# Patient Record
Sex: FEMALE | Race: WHITE | NOT HISPANIC OR LATINO | URBAN - METROPOLITAN AREA
[De-identification: names, ages, dates, MRNs, and addresses within clinical notes are randomized per-mention and may not be internally consistent; named-entity substitution may affect disease eponyms.]

---

## 2018-11-11 ENCOUNTER — HOSPITAL ENCOUNTER (EMERGENCY)
Facility: OTHER | Age: 43
Discharge: HOME | End: 2018-11-11
Attending: EMERGENCY MEDICINE
Payer: COMMERCIAL

## 2018-11-11 VITALS
SYSTOLIC BLOOD PRESSURE: 105 MMHG | HEART RATE: 90 BPM | OXYGEN SATURATION: 98 % | TEMPERATURE: 98 F | RESPIRATION RATE: 18 BRPM | WEIGHT: 140 LBS | HEIGHT: 63 IN | BODY MASS INDEX: 24.8 KG/M2 | DIASTOLIC BLOOD PRESSURE: 63 MMHG

## 2018-11-11 DIAGNOSIS — O41.8X10 SUBCHORIONIC HEMORRHAGE OF PLACENTA IN FIRST TRIMESTER, SINGLE OR UNSPECIFIED FETUS: ICD-10-CM

## 2018-11-11 DIAGNOSIS — O46.8X1 SUBCHORIONIC HEMORRHAGE OF PLACENTA IN FIRST TRIMESTER, SINGLE OR UNSPECIFIED FETUS: ICD-10-CM

## 2018-11-11 DIAGNOSIS — O20.0 THREATENED MISCARRIAGE IN EARLY PREGNANCY: Primary | ICD-10-CM

## 2018-11-11 LAB
ABO + RH BLD: NORMAL
ALBUMIN SERPL BCP-MCNC: 4.4 G/DL
ALP SERPL-CCNC: 61 U/L
ALT SERPL W/O P-5'-P-CCNC: 20 U/L
ANION GAP SERPL CALC-SCNC: 10 MMOL/L
AST SERPL-CCNC: 20 U/L
B-HCG UR QL: POSITIVE
BASOPHILS # BLD AUTO: 0.01 K/UL
BASOPHILS NFR BLD: 0.1 %
BILIRUB SERPL-MCNC: 1.2 MG/DL
BILIRUB UR QL STRIP: NEGATIVE
BLD GP AB SCN CELLS X3 SERPL QL: NORMAL
BUN SERPL-MCNC: 18 MG/DL
CALCIUM SERPL-MCNC: 9.6 MG/DL
CHLORIDE SERPL-SCNC: 106 MMOL/L
CLARITY UR: CLEAR
CO2 SERPL-SCNC: 23 MMOL/L
COLOR UR: YELLOW
CREAT SERPL-MCNC: 0.8 MG/DL
CTP QC/QA: YES
DIFFERENTIAL METHOD: NORMAL
EOSINOPHIL # BLD AUTO: 0.2 K/UL
EOSINOPHIL NFR BLD: 2.7 %
ERYTHROCYTE [DISTWIDTH] IN BLOOD BY AUTOMATED COUNT: 12.1 %
EST. GFR  (AFRICAN AMERICAN): >60 ML/MIN/1.73 M^2
EST. GFR  (NON AFRICAN AMERICAN): >60 ML/MIN/1.73 M^2
GLUCOSE SERPL-MCNC: 85 MG/DL
GLUCOSE UR QL STRIP: NEGATIVE
HCG INTACT+B SERPL-ACNC: NORMAL MIU/ML
HCT VFR BLD AUTO: 42.5 %
HGB BLD-MCNC: 14.2 G/DL
HGB UR QL STRIP: NEGATIVE
KETONES UR QL STRIP: NEGATIVE
LEUKOCYTE ESTERASE UR QL STRIP: NEGATIVE
LYMPHOCYTES # BLD AUTO: 1.5 K/UL
LYMPHOCYTES NFR BLD: 22.3 %
MCH RBC QN AUTO: 28.6 PG
MCHC RBC AUTO-ENTMCNC: 33.4 G/DL
MCV RBC AUTO: 86 FL
MONOCYTES # BLD AUTO: 0.4 K/UL
MONOCYTES NFR BLD: 6.3 %
NEUTROPHILS # BLD AUTO: 4.5 K/UL
NEUTROPHILS NFR BLD: 68.2 %
NITRITE UR QL STRIP: NEGATIVE
PH UR STRIP: 6 [PH] (ref 5–8)
PLATELET # BLD AUTO: 234 K/UL
PMV BLD AUTO: 9.4 FL
POTASSIUM SERPL-SCNC: 3.7 MMOL/L
PROT SERPL-MCNC: 7.5 G/DL
PROT UR QL STRIP: NEGATIVE
RBC # BLD AUTO: 4.97 M/UL
SODIUM SERPL-SCNC: 139 MMOL/L
SP GR UR STRIP: 1.02 (ref 1–1.03)
URN SPEC COLLECT METH UR: NORMAL
UROBILINOGEN UR STRIP-ACNC: NEGATIVE EU/DL
WBC # BLD AUTO: 6.67 K/UL

## 2018-11-11 PROCEDURE — 84702 CHORIONIC GONADOTROPIN TEST: CPT

## 2018-11-11 PROCEDURE — 81025 URINE PREGNANCY TEST: CPT | Performed by: EMERGENCY MEDICINE

## 2018-11-11 PROCEDURE — 25000003 PHARM REV CODE 250: Performed by: EMERGENCY MEDICINE

## 2018-11-11 PROCEDURE — 86850 RBC ANTIBODY SCREEN: CPT

## 2018-11-11 PROCEDURE — 81003 URINALYSIS AUTO W/O SCOPE: CPT

## 2018-11-11 PROCEDURE — 85025 COMPLETE CBC W/AUTO DIFF WBC: CPT

## 2018-11-11 PROCEDURE — 99284 EMERGENCY DEPT VISIT MOD MDM: CPT | Mod: 25

## 2018-11-11 PROCEDURE — 80053 COMPREHEN METABOLIC PANEL: CPT

## 2018-11-11 PROCEDURE — 96360 HYDRATION IV INFUSION INIT: CPT

## 2018-11-11 RX ADMIN — SODIUM CHLORIDE 500 ML: 0.9 INJECTION, SOLUTION INTRAVENOUS at 09:11

## 2018-11-11 NOTE — ED PROVIDER NOTES
Encounter Date: 2018    SCRIBE #1 NOTE: I, Jojo Jain, am scribing for, and in the presence of, Dr. Pelletier.       History     Chief Complaint   Patient presents with    Vaginal Bleeding     approx 6.5 wk pregnant, , reports bleeding but no clots, denies abd pain or cramping     Time seen by provider: 8:41 AM    This is a 43 y.o. Female, that is reportedly 7 weeks pregnant, who presents with complaint of vaginal bleeding that began last night. She reports bleeding continued until this morning, but she is not currently bleeding. The patient also reports breast tenderness, and nausea. She denies cough, cold, congestion, or rhinorrhea. The patient reports this is her fourth pregnancy, and her other three pregnancies were unsuccessful. The patient reports she has not experienced vaginal bleeding during her other pregnancies. The patient reports using IVF cycle for her current pregnancy. The patient denies having an OB in [place] currently. The patient reports she is currently taking Crinone and prenatal medications. She denies a history of major medical problems.      The history is provided by the patient.     Review of patient's allergies indicates:  No Known Allergies  History reviewed. No pertinent past medical history.  Past Surgical History:   Procedure Laterality Date    ANTERIOR CRUCIATE LIGAMENT REPAIR Right 2012    SPINAL FUSION  2013     History reviewed. No pertinent family history.  Social History     Tobacco Use    Smoking status: Never Smoker   Substance Use Topics    Alcohol use: Yes    Drug use: No     Review of Systems   Constitutional: Negative for chills, diaphoresis and fever.   HENT: Negative for congestion, rhinorrhea and sore throat.    Respiratory: Negative for shortness of breath.    Cardiovascular: Negative for chest pain.   Gastrointestinal: Positive for nausea. Negative for diarrhea and vomiting.   Genitourinary: Positive for vaginal bleeding. Negative for dysuria.    Musculoskeletal: Negative for back pain.   Skin: Negative for pallor, rash and wound.   Neurological: Negative for weakness.   Hematological: Does not bruise/bleed easily.       Physical Exam     Initial Vitals [11/11/18 0819]   BP Pulse Resp Temp SpO2   130/79 86 18 98.3 °F (36.8 °C) 100 %      MAP       --         Physical Exam    Nursing note and vitals reviewed.  Constitutional: She appears well-developed and well-nourished. She is not diaphoretic. No distress.   HENT:   Head: Normocephalic and atraumatic.   Eyes: EOM are normal.   Neck: Normal range of motion.   Cardiovascular: Normal rate, regular rhythm and normal heart sounds.   Pulmonary/Chest: Breath sounds normal. No respiratory distress. She has no wheezes. She has no rhonchi. She has no rales.   Abdominal: Soft. She exhibits no distension. There is no tenderness. There is no rebound and no guarding.   Musculoskeletal: Normal range of motion. She exhibits no edema or tenderness.   Neurological: She is alert and oriented to person, place, and time.   Skin: Skin is warm and dry. No rash noted. No erythema. No pallor.         ED Course   Procedures  Labs Reviewed   POCT URINE PREGNANCY - Abnormal; Notable for the following components:       Result Value    POC Preg Test, Ur Positive (*)     All other components within normal limits   URINALYSIS, REFLEX TO URINE CULTURE    Narrative:     Preferred Collection Type->Urine, Clean Catch   CBC W/ AUTO DIFFERENTIAL   COMPREHENSIVE METABOLIC PANEL   HCG, QUANTITATIVE, PREGNANCY   TYPE & SCREEN          Imaging Results          US OB Less Than 14 Wks with Transvag(xpd (Final result)  Result time 11/11/18 11:03:54    Final result by Rocky Lopes MD (11/11/18 11:03:54)                 Impression:      Single live intrauterine pregnancy with estimated gestational age 7 weeks 6 days. and an FUNMILAYO of 06/24/2019.    Small to moderate amount of subchorionic hemorrhage noted.      Electronically signed by: Rocky  MD Marianne  Date:    11/11/2018  Time:    11:03             Narrative:    EXAMINATION:  US OB LESS THAN 14 WKS WITH TRANSVAGINAL (XPD)    CLINICAL HISTORY:  Vag Bleeding;    TECHNIQUE:  Transabdominal sonography of the pelvis was performed, followed by transvaginal sonography to better evaluate the uterus and ovaries.    COMPARISON:  None.    FINDINGS:  Intrauterine gestation(s): Single    Mean gestational sac diameter: 2.7 cm    Yolk sac: Visualized    Crown-rump length (CRL): 0.62 cm    Cardiac activity: 122 bpm    Subchorionic hemorrhage: Small to moderate amount of implantational hemorrhage noted along the inferior and posterior margins of the gestational sac.  Cystic-appearing area noted along the left anterolateral margins of the gestational sac measuring up to 8 mm, also possibly representing a site of implantation hemorrhage.    Right ovary: Normal.    Left ovary: Normal.    Miscellaneous: No Free Fluid.  Fluid oval shaped fluid structure measuring up to 16 mm noted near the internal os of the cervix, presumably representing a nabothian cyst.                                 Medical Decision Making:   Clinical Tests:   Lab Tests: Ordered and Reviewed  Radiological Study: Ordered and Reviewed  ED Management:  11:34 AM - I spoke with OB about patient.    11:43 AM - OB will come see patient at bedside.            Scribe Attestation:   Scribe #1: I performed the above scribed service and the documentation accurately describes the services I performed. I attest to the accuracy of the note.    Attending Attestation:           Physician Attestation for Scribe:  Physician Attestation Statement for Scribe #1: I, Dr. Pelletier, reviewed documentation, as scribed by Jojo Jain in my presence, and it is both accurate and complete.         Attending ED Notes:   Emergent evaluation of a 43-year-old female G4, P0, A3 approximately 7 weeks gestation who presents to the emergency room with complaint of spotting blood  yesterday.  Patient denies any pelvic pain or discharge. Patient is afebrile, nontoxic appearing with stable vital signs. Patient remained hemodynamically stable while in the emergency room.  H&H is within normal limits. Patient is Rh positive. Ultrasound reveals intrauterine pregnancy with subchorionic hemorrhage. The patient is extensively counseled on her diagnosis and treatment including all diagnostic, laboratory and physical exam findings.  The patient discharged in good condition and directed to follow up with her OB in the next 24-48 hours.             Clinical Impression:     1. Threatened miscarriage in early pregnancy    2. Subchorionic hemorrhage of placenta in first trimester, single or unspecified fetus                                   Rocky Pelletier MD  11/11/18 2474

## 2018-11-11 NOTE — ED NOTES
Vaginal bleeding x2 days, reports no clots. Reports IVF. Recently moved to town has not established OB care here    LOC: The patient is awake, alert and aware of environment with an appropriate affect, the patient is oriented x 3 and speaking appropriately.  APPEARANCE: Patient resting comfortably and in no acute distress, patient is clean and well groomed, patient's clothing is properly fastened.  SKIN: The skin is warm and dry, patient has normal skin turgor and moist mucus membranes, skin intact, no breakdown or brusing noted.  MUSKULOSKELETAL: Patient moving all extremities well, no obvious swelling or deformities noted.  RESPIRATORY: Airway is open and patent, respirations are spontaneous, patient has a normal effort and rate. Breath sounds are clear and equal bilaterally.    ABDOMEN: Soft and non tender to palpation, no distention noted. Bowel sounds present.

## 2018-11-11 NOTE — ED NOTES
Rounding has been complete. Pt laying on stretcher with HOB elevated. NAD noted. Pt AAOx3. Safety/medical protocol initiated.Call bell within reach. Will continue to monitor.

## 2018-11-12 ENCOUNTER — PES CALL (OUTPATIENT)
Dept: ADMINISTRATIVE | Facility: CLINIC | Age: 43
End: 2018-11-12

## 2018-11-14 ENCOUNTER — INITIAL PRENATAL (OUTPATIENT)
Dept: OBSTETRICS AND GYNECOLOGY | Facility: CLINIC | Age: 43
End: 2018-11-14
Payer: COMMERCIAL

## 2018-11-14 ENCOUNTER — LAB VISIT (OUTPATIENT)
Dept: LAB | Facility: OTHER | Age: 43
End: 2018-11-14
Attending: OBSTETRICS & GYNECOLOGY
Payer: COMMERCIAL

## 2018-11-14 VITALS
DIASTOLIC BLOOD PRESSURE: 62 MMHG | BODY MASS INDEX: 26.24 KG/M2 | SYSTOLIC BLOOD PRESSURE: 114 MMHG | WEIGHT: 148.13 LBS

## 2018-11-14 DIAGNOSIS — O09.521 ELDERLY MULTIGRAVIDA IN FIRST TRIMESTER: ICD-10-CM

## 2018-11-14 DIAGNOSIS — Z32.01 POSITIVE PREGNANCY TEST: ICD-10-CM

## 2018-11-14 DIAGNOSIS — O09.819 HIGH RISK PREGNANCY DUE TO ASSISTED REPRODUCTIVE TECHNOLOGY: ICD-10-CM

## 2018-11-14 DIAGNOSIS — O09.299 TRISOMY 18 IN CHILD OF PRIOR PREGNANCY, CURRENTLY PREGNANT: ICD-10-CM

## 2018-11-14 DIAGNOSIS — Z32.01 POSITIVE PREGNANCY TEST: Primary | ICD-10-CM

## 2018-11-14 LAB
ABO + RH BLD: NORMAL
BASOPHILS # BLD AUTO: 0.01 K/UL
BASOPHILS NFR BLD: 0.1 %
BLD GP AB SCN CELLS X3 SERPL QL: NORMAL
DIFFERENTIAL METHOD: NORMAL
EOSINOPHIL # BLD AUTO: 0.3 K/UL
EOSINOPHIL NFR BLD: 3.1 %
ERYTHROCYTE [DISTWIDTH] IN BLOOD BY AUTOMATED COUNT: 12.1 %
HCT VFR BLD AUTO: 41.1 %
HGB BLD-MCNC: 13.6 G/DL
LYMPHOCYTES # BLD AUTO: 2.3 K/UL
LYMPHOCYTES NFR BLD: 24.5 %
MCH RBC QN AUTO: 28.5 PG
MCHC RBC AUTO-ENTMCNC: 33.1 G/DL
MCV RBC AUTO: 86 FL
MONOCYTES # BLD AUTO: 0.7 K/UL
MONOCYTES NFR BLD: 7.4 %
NEUTROPHILS # BLD AUTO: 6 K/UL
NEUTROPHILS NFR BLD: 64.6 %
PLATELET # BLD AUTO: 268 K/UL
PMV BLD AUTO: 9.3 FL
RBC # BLD AUTO: 4.78 M/UL
WBC # BLD AUTO: 9.25 K/UL

## 2018-11-14 PROCEDURE — 88175 CYTOPATH C/V AUTO FLUID REDO: CPT

## 2018-11-14 PROCEDURE — 87086 URINE CULTURE/COLONY COUNT: CPT

## 2018-11-14 PROCEDURE — 0500F INITIAL PRENATAL CARE VISIT: CPT | Mod: S$GLB,,, | Performed by: OBSTETRICS & GYNECOLOGY

## 2018-11-14 PROCEDURE — 36415 COLL VENOUS BLD VENIPUNCTURE: CPT

## 2018-11-14 PROCEDURE — 99999 PR PBB SHADOW E&M-EST. PATIENT-LVL III: CPT | Mod: PBBFAC,,, | Performed by: OBSTETRICS & GYNECOLOGY

## 2018-11-14 PROCEDURE — 86762 RUBELLA ANTIBODY: CPT

## 2018-11-14 PROCEDURE — 86703 HIV-1/HIV-2 1 RESULT ANTBDY: CPT

## 2018-11-14 PROCEDURE — 87491 CHLMYD TRACH DNA AMP PROBE: CPT

## 2018-11-14 PROCEDURE — 86901 BLOOD TYPING SEROLOGIC RH(D): CPT

## 2018-11-14 PROCEDURE — 86592 SYPHILIS TEST NON-TREP QUAL: CPT

## 2018-11-14 PROCEDURE — 85025 COMPLETE CBC W/AUTO DIFF WBC: CPT

## 2018-11-14 PROCEDURE — 87340 HEPATITIS B SURFACE AG IA: CPT

## 2018-11-14 RX ORDER — TEMAZEPAM 15 MG/1
CAPSULE ORAL
Refills: 0 | COMMUNITY
Start: 2018-11-01 | End: 2018-11-14

## 2018-11-14 RX ORDER — CLOBETASOL PROPIONATE 0.46 MG/ML
SOLUTION TOPICAL
Refills: 0 | COMMUNITY
Start: 2018-08-13 | End: 2018-11-14

## 2018-11-14 RX ORDER — BUPROPION HYDROCHLORIDE 100 MG/1
TABLET ORAL
Refills: 0 | COMMUNITY
Start: 2018-11-01 | End: 2018-11-14

## 2018-11-14 RX ORDER — ESTRADIOL 0.1 MG/D
PATCH TRANSDERMAL
Refills: 0 | COMMUNITY
Start: 2018-09-21 | End: 2018-11-14

## 2018-11-14 RX ORDER — OXYCODONE HYDROCHLORIDE 5 MG/1
TABLET ORAL
Refills: 0 | COMMUNITY
Start: 2018-10-09 | End: 2018-11-14

## 2018-11-14 RX ORDER — DOXYLAMINE SUCCINATE AND PYRIDOXINE HYDROCHLORIDE, DELAYED RELEASE TABLETS 10 MG/10 MG 10; 10 MG/1; MG/1
2 TABLET, DELAYED RELEASE ORAL NIGHTLY
Qty: 60 TABLET | Refills: 3 | Status: SHIPPED | OUTPATIENT
Start: 2018-11-14 | End: 2019-07-30 | Stop reason: ALTCHOICE

## 2018-11-14 RX ORDER — DEXTROAMPHETAMINE SACCHARATE, AMPHETAMINE ASPARTATE, DEXTROAMPHETAMINE SULFATE AND AMPHETAMINE SULFATE 2.5; 2.5; 2.5; 2.5 MG/1; MG/1; MG/1; MG/1
TABLET ORAL
Refills: 0 | COMMUNITY
Start: 2018-11-01 | End: 2018-11-14

## 2018-11-14 RX ORDER — CICLOPIROX 1 G/100ML
SHAMPOO TOPICAL
Refills: 0 | COMMUNITY
Start: 2018-10-08 | End: 2018-11-14

## 2018-11-15 PROBLEM — O09.299 TRISOMY 18 IN CHILD OF PRIOR PREGNANCY, CURRENTLY PREGNANT: Status: ACTIVE | Noted: 2018-11-15

## 2018-11-15 PROBLEM — O09.521 ELDERLY MULTIGRAVIDA IN FIRST TRIMESTER: Status: ACTIVE | Noted: 2018-11-15

## 2018-11-15 PROBLEM — O09.819 HIGH RISK PREGNANCY DUE TO ASSISTED REPRODUCTIVE TECHNOLOGY: Status: ACTIVE | Noted: 2018-11-15

## 2018-11-15 LAB
C TRACH DNA SPEC QL NAA+PROBE: NOT DETECTED
HBV SURFACE AG SERPL QL IA: NEGATIVE
HIV 1+2 AB+HIV1 P24 AG SERPL QL IA: NEGATIVE
N GONORRHOEA DNA SPEC QL NAA+PROBE: NOT DETECTED
RPR SER QL: NORMAL
RUBV IGG SER-ACNC: 36.5 IU/ML
RUBV IGG SER-IMP: REACTIVE

## 2018-11-16 LAB — BACTERIA UR CULT: NO GROWTH

## 2018-11-19 ENCOUNTER — PATIENT MESSAGE (OUTPATIENT)
Dept: OBSTETRICS AND GYNECOLOGY | Facility: CLINIC | Age: 43
End: 2018-11-19

## 2018-11-20 ENCOUNTER — PATIENT MESSAGE (OUTPATIENT)
Dept: OBSTETRICS AND GYNECOLOGY | Facility: CLINIC | Age: 43
End: 2018-11-20

## 2018-11-21 RX ORDER — ONDANSETRON 4 MG/1
4 TABLET, FILM COATED ORAL DAILY PRN
Qty: 30 TABLET | Refills: 1 | Status: SHIPPED | OUTPATIENT
Start: 2018-11-21 | End: 2019-04-22

## 2018-11-27 ENCOUNTER — PROCEDURE VISIT (OUTPATIENT)
Dept: OBSTETRICS AND GYNECOLOGY | Facility: CLINIC | Age: 43
End: 2018-11-27
Payer: COMMERCIAL

## 2018-11-27 DIAGNOSIS — Z32.01 POSITIVE PREGNANCY TEST: ICD-10-CM

## 2018-11-27 DIAGNOSIS — O20.9 VAGINAL BLEEDING IN PREGNANCY, FIRST TRIMESTER: ICD-10-CM

## 2018-11-27 DIAGNOSIS — O09.819 HIGH RISK PREGNANCY DUE TO ASSISTED REPRODUCTIVE TECHNOLOGY: ICD-10-CM

## 2018-11-27 DIAGNOSIS — O09.521 ELDERLY MULTIGRAVIDA IN FIRST TRIMESTER: ICD-10-CM

## 2018-11-27 PROCEDURE — 76801 OB US < 14 WKS SINGLE FETUS: CPT | Mod: S$GLB,,, | Performed by: OBSTETRICS & GYNECOLOGY

## 2018-11-27 NOTE — PROCEDURES
Obstetrical ultrasound completed today.  See report in imaging section of [facility].

## 2018-11-28 ENCOUNTER — PATIENT MESSAGE (OUTPATIENT)
Dept: OBSTETRICS AND GYNECOLOGY | Facility: CLINIC | Age: 43
End: 2018-11-28

## 2018-12-11 ENCOUNTER — INITIAL CONSULT (OUTPATIENT)
Dept: MATERNAL FETAL MEDICINE | Facility: CLINIC | Age: 43
End: 2018-12-11
Payer: COMMERCIAL

## 2018-12-11 ENCOUNTER — PROCEDURE VISIT (OUTPATIENT)
Dept: MATERNAL FETAL MEDICINE | Facility: CLINIC | Age: 43
End: 2018-12-11
Payer: COMMERCIAL

## 2018-12-11 VITALS
BODY MASS INDEX: 26.87 KG/M2 | DIASTOLIC BLOOD PRESSURE: 90 MMHG | SYSTOLIC BLOOD PRESSURE: 138 MMHG | WEIGHT: 151.69 LBS

## 2018-12-11 DIAGNOSIS — O36.80X0 ENCOUNTER TO DETERMINE FETAL VIABILITY OF PREGNANCY, SINGLE OR UNSPECIFIED FETUS: ICD-10-CM

## 2018-12-11 DIAGNOSIS — O28.3 INCREASED NUCHAL TRANSLUCENCY SPACE ON FETAL ULTRASOUND: ICD-10-CM

## 2018-12-11 DIAGNOSIS — O09.521 ELDERLY MULTIGRAVIDA IN FIRST TRIMESTER: Primary | ICD-10-CM

## 2018-12-11 DIAGNOSIS — O36.80X0 ENCOUNTER TO DETERMINE FETAL VIABILITY OF PREGNANCY, SINGLE OR UNSPECIFIED FETUS: Primary | ICD-10-CM

## 2018-12-11 DIAGNOSIS — O09.819 HIGH RISK PREGNANCY DUE TO ASSISTED REPRODUCTIVE TECHNOLOGY: ICD-10-CM

## 2018-12-11 DIAGNOSIS — O28.3 INCREASED NUCHAL TRANSLUCENCY SPACE ON FETAL ULTRASOUND: Primary | ICD-10-CM

## 2018-12-11 DIAGNOSIS — O35.8XX0 CYSTIC HYGROMA OF FETUS IN SINGLETON PREGNANCY: ICD-10-CM

## 2018-12-11 DIAGNOSIS — O09.521 ELDERLY MULTIGRAVIDA IN FIRST TRIMESTER: ICD-10-CM

## 2018-12-11 DIAGNOSIS — N96 RECURRENT PREGNANCY LOSS: ICD-10-CM

## 2018-12-11 DIAGNOSIS — O09.299 TRISOMY 18 IN CHILD OF PRIOR PREGNANCY, CURRENTLY PREGNANT: ICD-10-CM

## 2018-12-11 PROCEDURE — 76945 ECHO GUIDE VILLUS SAMPLING: CPT | Mod: S$GLB,,, | Performed by: OBSTETRICS & GYNECOLOGY

## 2018-12-11 PROCEDURE — 59015 CHORION BIOPSY: CPT | Mod: S$GLB,,, | Performed by: OBSTETRICS & GYNECOLOGY

## 2018-12-11 PROCEDURE — 76813 OB US NUCHAL MEAS 1 GEST: CPT | Mod: S$GLB,,, | Performed by: OBSTETRICS & GYNECOLOGY

## 2018-12-11 PROCEDURE — 99499 UNLISTED E&M SERVICE: CPT | Mod: S$GLB,,, | Performed by: OBSTETRICS & GYNECOLOGY

## 2018-12-11 PROCEDURE — 99243 OFF/OP CNSLTJ NEW/EST LOW 30: CPT | Mod: 25,S$GLB,, | Performed by: OBSTETRICS & GYNECOLOGY

## 2018-12-11 PROCEDURE — 99999 PR PBB SHADOW E&M-EST. PATIENT-LVL III: CPT | Mod: PBBFAC,,, | Performed by: OBSTETRICS & GYNECOLOGY

## 2018-12-11 NOTE — LETTER
December 11, 2018      Siobhan Burch MD  [address]  [address]  [facility] [address]           [facility]  [address]  [facility] [address]  Phone: [phone]          Patient: Marisela Siddiqui   MR Number: [id number]   YOB: 1975   Date of Visit: 12/11/2018       Dear Dr. Siobhan Burch:    Thank you for referring Marisela Siddiqui to me for evaluation. Attached you will find relevant portions of my assessment and plan of care.    If you have questions, please do not hesitate to call me. I look forward to following Marisela Siddiqui along with you.    Sincerely,    Russ Lazaro RN    Enclosure  CC:  No Recipients    If you would like to receive this communication electronically, please contact [email or web address] or [phone] to request more information on [employer] access.    For providers and/or their staff who would like to refer a patient to [facility], please contact us through our one-stop-shop provider referral line, [facility], at [phone].    If you feel you have received this communication in error or would no longer like to receive these types of communications, please e-mail [email or web address]

## 2018-12-11 NOTE — PROGRESS NOTES
CVS Procedure Note    Indication  ========    Karyotyping. Increased fetal nuchal translucency.    History  ======    General History  Blood group: 0  Rhesus: Rh positive  Previous Outcomes  Preg. no. 1  Outcome: Spontaneous miscarriage  Preg. no. 2  Outcome: Spontaneous miscarriage  Preg. no. 3  Outcome: Spontaneous miscarriage   4  Para 0  Risk Factors  Details: IVF  History risk factors: AMA    Method  ======    Transabdominal ultrasound examination, 2D Color Doppler, Voluson E10. View: Good view.    Pregnancy  =========    Cruz pregnancy. Number of fetuses: 1.    Dating  ======    Conception: IVF  Embryo transfer on: 10/12/2018  IVF / ET 3 d  GA by IVF / ET 11 w + 0 d  FUNMILAYO by IVF / ET: 2019  Assigned: Dating performed on 2018, based on the IVF / ET date  Assigned GA 11 w + 0 d  Assigned FUNMILAYO: 2019    General Evaluation  ==============    Cardiac activity: present.  Placenta: anterior.    Fetal Biometry  ============    Fetal Biometry  Calculated by: Hadlock (BPD-HC-AC-FL)   bpm    Counseling  =========    CVS was accepted by the patient on 2018. The risk of the CVS was discussed with the patient. Consent form was obtained. Course of  the CVS procedure, risk, possible test results and follow-up instructions were discussed.    Premedication / Anesthesia  =====================    Premedication / Anesthesia not given.    CVS  ====    Start 2:50 PM. Duration 10 min.  Instrument: Cook catheter. Entries uterus: 2. Entries target: 1.  Sample: obtained. Sample amount 15 mg.  Sample preparation: direct preparation. Sample identification confirmed.    Evaluation  ========    Post cardiac activity: present, normal. FHR post 158 bpm.  Post NST not performed.  Post AF assessment not performed.    Immediate Treatment  ================    No treatment.    Rhesus Prophylaxis  ===============    Rh positive.    Consultation  ==========    Risks, benefits and alternatives discussed with  patient.  She desired to proceed.  Post-procedure precautions reviewed with patient.  See Dr. Veras's note on findings and counseling on US findings.    Impression  =========    Successful CVS for increased NT  Tissue sent for FISH, karyotype and hold for microarray.    Recommendation  ==============    Follow-up results of CVS.

## 2018-12-11 NOTE — PROGRESS NOTES
Indication  ========    First trimester screening.    History  ======    Previous Outcomes  Preg. no. 1  Outcome: Spontaneous miscarriage  Preg. no. 2  Outcome: Spontaneous miscarriage  Preg. no. 3  Outcome: Spontaneous miscarriage   4  Para 0  Risk Factors  Details: IVF  History risk factors: AMA    Maternal Assessment  =================    BP syst 138 mmHg  BP diast 90 mmHg    Method  ======    Transabdominal ultrasound examination, 2D Color Doppler, Voluson E10. View: Good view.    Pregnancy  =========    Cruz pregnancy. Number of fetuses: 1.    Dating  ======    Conception: IVF  Embryo transfer on: 10/12/2018  IVF / ET 3 d  GA by IVF / ET 11 w + 0 d  FUNMILAYO by IVF / ET: 2019  Ultrasound examination on: 2018  GA by U/S based upon: CRL  GA by U/S 10 w + 4 d  FUNMILAYO by U/S: 2019  Assigned: Dating performed on 2018, based on the IVF / ET date  Assigned GA 11 w + 0 d  Assigned FUNMILAYO: 2019    General Evaluation  ==============    Cardiac activity: present.    Fetal Biometry  ============    CRL 36.5 mm 10w 4d Hadlock  NT 4.3 mm   bpm    Fetal Anatomy  ============    The following structures appear abnormal:  Neck: cystic hygroma.    The following structures were visualized:  Cranium. Arms. Legs.    Consultation  ==========    Type: IVF pregnancy and history of T18 fetus and recurrent loss.  Ms. Siddiqui is a 44y/o  at 11 weeks based on 3 day embryo transfer who presents for consultation due to IVF pregnancy and  history of T18 SAB at 12 weeks and recurrent pregnancy loss.    PMHx: AMA  IVF embryo transfer-own eggs at age 43 (no PGT-A done)  PSHx: D and C (multiple)  Meds: Diclegis, PNV, vaginal progesterone  Surgeries: -spinal fusion  -right anterior cruciate ligament repair  Allergy: sulfa  Social: Denies smoking, alcohol, illicit drug use  Family history birth defects: Patient had Trisomy 18 fetus (CVS done prior to demise) with D and C x2-12 weeks  Patient also  reports a 6 week loss with D and Cx2-reports was Trisomy 22  Patient reports 5 week loss with D and C-was Trisomy 22  Has not had NIPT drawn yet    /90  Beta 2 glycoprotein antibodies negative, LAC negative, Anticardiolipin antibodies negative  Normal maternal karyotype, patient reports FOB had normal chromosomes.  Limited records available    A/P:1. IUP at 11 weeks    2. Cystic hygroma: On ultrasound today, a cystic hygroma was seen with mild body wall edema. Consultation was limited today given  ultrasound findings of a cystic hygroma and focused primarily on this finding. We discussed that a cystic hygroma can be associated with  chromosomal abnormalities, genetic conditions, structural malformations and increased risk of adverse outcome and fetal/ demise.  Approximately 50% of fetuses with a cystic hygroma have a chromosomal abnormality. We reviewed the common types of chromosomal  abnormalities seen with cystic hygroma. We discussed that if the fetus is euploid, about 1/3 of those fetuses will have a major structural  anomaly with common abnormalities including cardiac and skeletal anomalies. For euploid fetuses without structural abnormalities, there is  still a 25% risk of demise. Approximately 15-20% of fetuses with first trimester cystic hygroma end in normal liveborn term . The  patient was shown this information in Fetology. Approximately, 80% resolve. We discussed the body wall edema does increase the risk for  demise. We also discussed the potential association of cystic hygroma with genetic syndromes, including but not limited to [place] syndrome  and disorders of copy number variation. The patient is considering termination. We reviewed the limitations of NIPT and that NIPT is not a  diagnostic test. The patient desires a CVS. She is Rh positive. We discussed the risks, benefits, and alternatives and limitations of CVS and  the 1/500 risk of pregnancy related complications. We also  discussed confined placental mosaicism. We discussed FISH aneuscreen,  karyotype and option of microarray. If patient continues the pregnancy, recommend primary ob offer MSAFP only in the second trimester. A 16  week ultrasound for limited anatomy, ultrasound for detailed anatomy at 19 to 20 weeks and a fetal echo would recommended, with follow up  growth as pregnancy progresses. Patient will consider PGT-A in the future -she had not done because she was concerned about potential  effect on the embryo. Patient is aware that even with a negative  workup, there is still about a 5% residual risk of an abnormal  pediatric outcome per Fetology. Risk of progression to hydrops during pregnancy was reviewed.Dr. Escobar will perform CVS this pm. Dr. Burch  aware.    3. AMA:AMA is associated with an increased risk of adverse pregnancy outcomes such as miscarriage, ectopic pregnancy, diabetes,  hypertension, and other adverse outcomes. There is also an increased risk of stillbirth, particularly in women age 40 and above.    -[facility] recommends the following for women 35 and older at their FUNMILAYO:  -Detailed fetal anatomic survey  --In women 40 years and older at FUNMILAYO, recommend weekly MVP/NST or weekly BPP at 32 weeks gestation. (this may be altered  depending  on if there is a chromosomal abnormality or structural malformation)  -In women 40 years and older at FUNMILAYO, recommend delivery between 39 and 40 weeks gestation.  -Recommend offering baby aspirin 81mg PO daily at 12-14 weeks gestation  This was not extensively discussed with the patient today given the above findings.    4. With IVF pregnancies, fetal echo and  fetal surveillance are recommended.    5. Primary ob provider should advise the patient of the risks of multiple D and Cs.    6. BP was elevated today-patient reports this is due to anxiety and not a chronic condition.      45 minutes was spent in face to face time with greater than half of  that time spent in counseling and coordination of care.    Impression  =========    Cruz live intrauterine pregnancy.  Biometry agrees with the clinic dating.  Cystic hygroma is noted with body wall edema thorax and abdomen.    Recommendation  ==============    CVS this afternoon with Dr. Escobar.  Further follow up to be planned pending CVS results.

## 2018-12-12 ENCOUNTER — DOCUMENTATION ONLY (OUTPATIENT)
Dept: MATERNAL FETAL MEDICINE | Facility: CLINIC | Age: 43
End: 2018-12-12

## 2018-12-12 NOTE — PROGRESS NOTES
Spoke to patient via phone.  She is doing well.   Started back on Wellbutrin.  Questions about who would call with results-discussed often nurses would call if normal but we would be happy to have physician to call her with results regardless.  Staff and Dr. Escobar messaged.

## 2018-12-14 ENCOUNTER — TELEPHONE (OUTPATIENT)
Dept: MATERNAL FETAL MEDICINE | Facility: CLINIC | Age: 43
End: 2018-12-14

## 2018-12-14 ENCOUNTER — PATIENT MESSAGE (OUTPATIENT)
Dept: OBSTETRICS AND GYNECOLOGY | Facility: CLINIC | Age: 43
End: 2018-12-14

## 2018-12-14 ENCOUNTER — PATIENT MESSAGE (OUTPATIENT)
Dept: MATERNAL FETAL MEDICINE | Facility: CLINIC | Age: 43
End: 2018-12-14

## 2018-12-14 NOTE — TELEPHONE ENCOUNTER
Result from the chorionic villus sampling were received this morning from [place].  I called the patient and discussed them with her.  Findings were consistent with trisomy 18.  I explained that given the ultrasound findings plus the fish, while the karyotype will take 5 to 7 more days, I would anticipate it confirming these findings. She requested a copy of the results and she is going to come and pick that up.  I explained to her that [facility] does not perform pregnancy interruption for fetal conditions.  She was provided with information on some clinics in the area by Dr. Veras.  I explained that I would be happy to discuss with any providers or provide any assistance I could to her.

## 2018-12-16 ENCOUNTER — PATIENT MESSAGE (OUTPATIENT)
Dept: OBSTETRICS AND GYNECOLOGY | Facility: CLINIC | Age: 43
End: 2018-12-16

## 2018-12-17 LAB
MISCELLANEOUS TEST NAME: NORMAL
REFERENCE LAB: NORMAL
SPECIMEN TYPE: NORMAL
TEST RESULT: NORMAL

## 2018-12-18 ENCOUNTER — TELEPHONE (OUTPATIENT)
Dept: MATERNAL FETAL MEDICINE | Facility: CLINIC | Age: 43
End: 2018-12-18

## 2018-12-18 NOTE — TELEPHONE ENCOUNTER
Patient was notified of CVS chromosome analysis result:  47, XX, +18    Pt verbalized understanding of information.

## 2018-12-20 ENCOUNTER — PATIENT MESSAGE (OUTPATIENT)
Dept: MATERNAL FETAL MEDICINE | Facility: CLINIC | Age: 43
End: 2018-12-20

## 2018-12-20 LAB
MISCELLANEOUS TEST NAME: NORMAL
REFERENCE LAB: NORMAL
SPECIMEN TYPE: NORMAL
TEST RESULT: NORMAL

## 2019-01-11 NOTE — PROGRESS NOTES
Subjective:       Patient ID: Marisela Siddiqui is a 43 y.o. female.    Chief Complaint: Establish care, annual     HPI  This patient is new to me.   Marisela Siddiqui is a 43 y.o. year old female with seborrheic dermatitis, post-partum depression, ADHD, recurrent oral herpes who presents today to establish care.     Patient recently moved here with  from [facility].     Patient has gone through multiple IVF cycles and subsequent miscarriages. Most recently she traveled out of [facility] for a D&E with a trisomy 18 fetus. Patient has had bouts of post-partum depression. Currently she takes bupropion 200mg daily, which is helping. Says she is feeling better, although not back to normal. Still has crying spells. Trying to be more social again. She is functional at work. Denies SI.     Seborrheic dermatitis - uses Ciclopirox shampoo - still has symptoms, however.     Recurrent oral herpes - uses valacyclovir PRN. Flares more when she is stressed.     ADHD - Used to take Adderall 10mg once a day. Hasn't taken in 6 months due to pregnancies.     Exercise - has been very active in the past, getting back into running again now.     Per patient, tetanus vaccine was in 2015.     OB/GYN History     LMP: mid-2018  Sexually active: yes  Last Pap smear: 18 - normal   Mammogram: 2015 - normal     I personally reviewed Past Medical History, Past Surgical History, Social History, and Family History    Review of Systems   Constitutional: Negative for chills, fatigue, fever and unexpected weight change.   HENT: Negative for congestion, hearing loss, rhinorrhea and sore throat.    Eyes: Negative for visual disturbance.   Respiratory: Negative for cough, shortness of breath and wheezing.    Cardiovascular: Negative for chest pain, palpitations and leg swelling.   Gastrointestinal: Negative for abdominal pain, constipation, diarrhea, nausea and vomiting.   Genitourinary: Negative for dysuria, frequency, menstrual problem and  "urgency.   Musculoskeletal: Negative for arthralgias and myalgias.   Skin: Negative for rash.   Neurological: Negative for dizziness, syncope and headaches.   Psychiatric/Behavioral: Positive for dysphoric mood. Negative for self-injury and sleep disturbance. The patient is not nervous/anxious.        Objective:      Vitals:    01/14/19 1127   BP: (!) 128/90   Pulse: 87   Resp: 12   SpO2: 100%   Weight: 70.5 kg (155 lb 6.8 oz)   Height: 5' 3" (1.6 m)     Physical Exam   Constitutional: She is oriented to person, place, and time. She appears well-developed and well-nourished. No distress.   HENT:   Head: Normocephalic and atraumatic.   Right Ear: Hearing, tympanic membrane, external ear and ear canal normal.   Left Ear: Hearing, tympanic membrane, external ear and ear canal normal.   Nose: Nose normal.   Mouth/Throat: Oropharynx is clear and moist and mucous membranes are normal. No oropharyngeal exudate.   Eyes: Conjunctivae and lids are normal. Pupils are equal, round, and reactive to light.   Neck: Normal range of motion. No thyroid mass and no thyromegaly present.   Cardiovascular: Normal rate, regular rhythm, S1 normal, S2 normal and intact distal pulses.   No murmur heard.  No lower extremity edema.    Pulmonary/Chest: Effort normal and breath sounds normal. No respiratory distress.   Abdominal: Soft. Normal appearance and bowel sounds are normal. There is no tenderness.   Lymphadenopathy:     She has no cervical adenopathy.        Right: No supraclavicular adenopathy present.        Left: No supraclavicular adenopathy present.   Neurological: She is alert and oriented to person, place, and time.   Skin: Skin is warm and dry. No rash noted.   Psychiatric: She has a normal mood and affect. Her behavior is normal. Thought content normal.   Nursing note and vitals reviewed.      Assessment:       1. Annual physical exam    2. Post partum depression    3. Seborrheic dermatitis of scalp    4. Recurrent oral herpes " simplex    5. Attention deficit hyperactivity disorder (ADHD), unspecified ADHD type    6. Screening for lipid disorders    7. Screening for skin cancer    8. Encounter for screening mammogram for breast cancer        Plan:      Diagnoses and all orders for this visit:    Annual physical exam        - Screening labs ordered. Discussed healthy diet and exercise. Patient had normal fasting glucose in Nov 2018. Up to date with Pap smear. Mammogram ordered.   -     TSH; Future  -     Lipid panel; Future    Post partum depression         - Continue bupropion 200mg daily. Symptoms improved and stable. Patient will decide if she would like to see psychiatry in the future if symptoms worsen or are difficult to control.     Seborrheic dermatitis of scalp  -     ciclopirox 1 % shampoo; Apply ~5-10 mL to wet hair; lather, and leave on hair and scalp for ~3 minutes; rinse.  -     Ambulatory Referral to Dermatology    Recurrent oral herpes simplex        - Continue PRN Valtrex  -     valACYclovir (VALTREX) 1000 MG tablet; 2g twice daily for 1 day as needed for cold sores    Attention deficit hyperactivity disorder (ADHD), unspecified ADHD type        - No treatment needed at this time.     Screening for lipid disorders  -     Lipid panel; Future    Screening for skin cancer  -     Ambulatory Referral to Dermatology    Encounter for screening mammogram for breast cancer  -     Mammo Digital Screening Bilat w/ Agust[facility]; Future

## 2019-01-14 ENCOUNTER — OFFICE VISIT (OUTPATIENT)
Dept: INTERNAL MEDICINE | Facility: CLINIC | Age: 44
End: 2019-01-14
Payer: COMMERCIAL

## 2019-01-14 ENCOUNTER — LAB VISIT (OUTPATIENT)
Dept: LAB | Facility: OTHER | Age: 44
End: 2019-01-14
Attending: FAMILY MEDICINE
Payer: COMMERCIAL

## 2019-01-14 VITALS
SYSTOLIC BLOOD PRESSURE: 128 MMHG | RESPIRATION RATE: 12 BRPM | HEART RATE: 87 BPM | HEIGHT: 63 IN | BODY MASS INDEX: 27.54 KG/M2 | WEIGHT: 155.44 LBS | DIASTOLIC BLOOD PRESSURE: 90 MMHG | OXYGEN SATURATION: 100 %

## 2019-01-14 DIAGNOSIS — Z00.00 ANNUAL PHYSICAL EXAM: Primary | ICD-10-CM

## 2019-01-14 DIAGNOSIS — Z13.220 SCREENING FOR LIPID DISORDERS: ICD-10-CM

## 2019-01-14 DIAGNOSIS — B00.2 RECURRENT ORAL HERPES SIMPLEX: ICD-10-CM

## 2019-01-14 DIAGNOSIS — Z12.83 SCREENING FOR SKIN CANCER: ICD-10-CM

## 2019-01-14 DIAGNOSIS — F90.9 ATTENTION DEFICIT HYPERACTIVITY DISORDER (ADHD), UNSPECIFIED ADHD TYPE: ICD-10-CM

## 2019-01-14 DIAGNOSIS — Z00.00 ANNUAL PHYSICAL EXAM: ICD-10-CM

## 2019-01-14 DIAGNOSIS — Z12.31 ENCOUNTER FOR SCREENING MAMMOGRAM FOR BREAST CANCER: ICD-10-CM

## 2019-01-14 DIAGNOSIS — L21.9 SEBORRHEIC DERMATITIS OF SCALP: ICD-10-CM

## 2019-01-14 LAB
CHOLEST SERPL-MCNC: 198 MG/DL
CHOLEST/HDLC SERPL: 2.4 {RATIO}
HDLC SERPL-MCNC: 83 MG/DL
HDLC SERPL: 41.9 %
LDLC SERPL CALC-MCNC: 101.2 MG/DL
NONHDLC SERPL-MCNC: 115 MG/DL
TRIGL SERPL-MCNC: 69 MG/DL
TSH SERPL DL<=0.005 MIU/L-ACNC: 2.11 UIU/ML

## 2019-01-14 PROCEDURE — 99386 PR PREVENTIVE VISIT,NEW,40-64: ICD-10-PCS | Mod: S$GLB,,, | Performed by: FAMILY MEDICINE

## 2019-01-14 PROCEDURE — 80061 LIPID PANEL: CPT

## 2019-01-14 PROCEDURE — 84443 ASSAY THYROID STIM HORMONE: CPT

## 2019-01-14 PROCEDURE — 99999 PR PBB SHADOW E&M-EST. PATIENT-LVL III: CPT | Mod: PBBFAC,,, | Performed by: FAMILY MEDICINE

## 2019-01-14 PROCEDURE — 36415 COLL VENOUS BLD VENIPUNCTURE: CPT

## 2019-01-14 PROCEDURE — 99386 PREV VISIT NEW AGE 40-64: CPT | Mod: S$GLB,,, | Performed by: FAMILY MEDICINE

## 2019-01-14 PROCEDURE — 99999 PR PBB SHADOW E&M-EST. PATIENT-LVL III: ICD-10-PCS | Mod: PBBFAC,,, | Performed by: FAMILY MEDICINE

## 2019-01-14 RX ORDER — BUPROPION HYDROCHLORIDE 200 MG/1
100 TABLET, EXTENDED RELEASE ORAL 2 TIMES DAILY
COMMUNITY
End: 2019-01-18

## 2019-01-14 RX ORDER — VALACYCLOVIR HYDROCHLORIDE 1 G/1
TABLET, FILM COATED ORAL
Qty: 30 TABLET | Refills: 1 | Status: SHIPPED | OUTPATIENT
Start: 2019-01-14 | End: 2019-07-30 | Stop reason: ALTCHOICE

## 2019-01-14 RX ORDER — CICLOPIROX 1 G/100ML
SHAMPOO TOPICAL
Qty: 120 ML | Refills: 1 | Status: SHIPPED | OUTPATIENT
Start: 2019-01-14 | End: 2019-01-22 | Stop reason: SDUPTHER

## 2019-01-16 ENCOUNTER — PATIENT MESSAGE (OUTPATIENT)
Dept: INTERNAL MEDICINE | Facility: CLINIC | Age: 44
End: 2019-01-16

## 2019-01-18 RX ORDER — BUPROPION HYDROCHLORIDE 100 MG/1
TABLET, EXTENDED RELEASE ORAL
Qty: 60 TABLET | Refills: 2 | Status: SHIPPED | OUTPATIENT
Start: 2019-01-18 | End: 2019-06-15 | Stop reason: SDUPTHER

## 2019-01-21 ENCOUNTER — PATIENT MESSAGE (OUTPATIENT)
Dept: INTERNAL MEDICINE | Facility: CLINIC | Age: 44
End: 2019-01-21

## 2019-01-21 ENCOUNTER — HOSPITAL ENCOUNTER (OUTPATIENT)
Dept: RADIOLOGY | Facility: OTHER | Age: 44
Discharge: HOME | End: 2019-01-21
Attending: FAMILY MEDICINE
Payer: COMMERCIAL

## 2019-01-21 DIAGNOSIS — Z12.31 ENCOUNTER FOR SCREENING MAMMOGRAM FOR BREAST CANCER: ICD-10-CM

## 2019-01-21 PROCEDURE — 77063 BREAST TOMOSYNTHESIS BI: CPT | Mod: 26,,, | Performed by: RADIOLOGY

## 2019-01-21 PROCEDURE — 77067 MAMMO DIGITAL SCREENING BILAT WITH TOMOSYNTHESIS_CAD: ICD-10-PCS | Mod: 26,,, | Performed by: RADIOLOGY

## 2019-01-21 PROCEDURE — 77067 SCR MAMMO BI INCL CAD: CPT | Mod: TC

## 2019-01-21 PROCEDURE — 77063 MAMMO DIGITAL SCREENING BILAT WITH TOMOSYNTHESIS_CAD: ICD-10-PCS | Mod: 26,,, | Performed by: RADIOLOGY

## 2019-01-21 PROCEDURE — 77067 SCR MAMMO BI INCL CAD: CPT | Mod: 26,,, | Performed by: RADIOLOGY

## 2019-01-22 ENCOUNTER — INITIAL CONSULT (OUTPATIENT)
Dept: DERMATOLOGY | Facility: CLINIC | Age: 44
End: 2019-01-22
Payer: COMMERCIAL

## 2019-01-22 DIAGNOSIS — L57.0 AK (ACTINIC KERATOSIS): Primary | ICD-10-CM

## 2019-01-22 DIAGNOSIS — L82.0 BENIGN LICHENOID KERATOSIS: ICD-10-CM

## 2019-01-22 DIAGNOSIS — L21.9 SEBORRHEIC DERMATITIS OF SCALP: ICD-10-CM

## 2019-01-22 PROCEDURE — 17110 PR DESTRUCTION BENIGN LESIONS UP TO 14: ICD-10-PCS | Mod: S$GLB,,, | Performed by: NURSE PRACTITIONER

## 2019-01-22 PROCEDURE — 17000 PR DESTRUCTION(LASER SURGERY,CRYOSURGERY,CHEMOSURGERY),PREMALIGNANT LESIONS,FIRST LESION: ICD-10-PCS | Mod: 59,S$GLB,, | Performed by: NURSE PRACTITIONER

## 2019-01-22 PROCEDURE — 99202 PR OFFICE/OUTPT VISIT, NEW, LEVL II, 15-29 MIN: ICD-10-PCS | Mod: 25,S$GLB,, | Performed by: NURSE PRACTITIONER

## 2019-01-22 PROCEDURE — 99999 PR PBB SHADOW E&M-EST. PATIENT-LVL III: ICD-10-PCS | Mod: PBBFAC,,, | Performed by: NURSE PRACTITIONER

## 2019-01-22 PROCEDURE — 99202 OFFICE O/P NEW SF 15 MIN: CPT | Mod: 25,S$GLB,, | Performed by: NURSE PRACTITIONER

## 2019-01-22 PROCEDURE — 99999 PR PBB SHADOW E&M-EST. PATIENT-LVL III: CPT | Mod: PBBFAC,,, | Performed by: NURSE PRACTITIONER

## 2019-01-22 PROCEDURE — 17000 DESTRUCT PREMALG LESION: CPT | Mod: 59,S$GLB,, | Performed by: NURSE PRACTITIONER

## 2019-01-22 PROCEDURE — 17110 DESTRUCTION B9 LES UP TO 14: CPT | Mod: S$GLB,,, | Performed by: NURSE PRACTITIONER

## 2019-01-22 RX ORDER — CLOBETASOL PROPIONATE 0.46 MG/ML
SOLUTION TOPICAL
Qty: 50 ML | Refills: 3 | Status: SHIPPED | OUTPATIENT
Start: 2019-01-22 | End: 2019-07-30 | Stop reason: ALTCHOICE

## 2019-01-22 RX ORDER — CICLOPIROX 1 G/100ML
SHAMPOO TOPICAL
Qty: 120 ML | Refills: 1 | Status: SHIPPED | OUTPATIENT
Start: 2019-01-22 | End: 2019-07-30 | Stop reason: SDUPTHER

## 2019-01-22 RX ORDER — KETOCONAZOLE 20 MG/G
CREAM TOPICAL
Qty: 60 G | Refills: 3 | Status: SHIPPED | OUTPATIENT
Start: 2019-01-22

## 2019-01-22 NOTE — PROGRESS NOTES
Subjective:       Patient ID:  Marisela Siddiqui is a 43 y.o. female who presents for   Chief Complaint   Patient presents with    Seborrheic Dermatitis     salp, X3mo, prev tx     Spot     nose and R.leg, rough, no prev tx      Seborrheic Dermatitis  - Initial  Affected locations: scalp  Duration: 6 months  Signs / symptoms: scaling, dryness, scabs and inflamed  Severity: mild to moderate  Timing: constant  Treatments tried: clobetasol prn weekly & ciclopriox QOD.  Improvement on treatment: no relief    Spot  - Initial  Affected locations: nose  Duration: 3 years  Signs / symptoms: scaling  Severity: mild  Timing: constant  Aggravated by: nothing  Treatments tried: previous LN2 w/ Derm in [place].        Review of Systems   Constitutional: Negative for fever, chills, weight loss, weight gain, fatigue, night sweats and malaise.   Skin: Positive for activity-related sunscreen use. Negative for daily sunscreen use.        Objective:    Physical Exam   Constitutional: She appears well-developed and well-nourished. No distress.   Neurological: She is alert and oriented to person, place, and time. She is not disoriented.   Psychiatric: She has a normal mood and affect.   Skin:   Areas Examined (abnormalities noted in diagram):   Scalp / Hair Palpated and Inspected  Head / Face Inspection Performed  Neck Inspection Performed  RLE Inspected  LLE Inspection Performed                   Diagram Legend     Erythematous scaling macule/papule c/w actinic keratosis       Vascular papule c/w angioma      Pigmented verrucoid papule/plaque c/w seborrheic keratosis      Yellow umbilicated papule c/w sebaceous hyperplasia      Irregularly shaped tan macule c/w lentigo     1-2 mm smooth white papules consistent with Milia      Movable subcutaneous cyst with punctum c/w epidermal inclusion cyst      Subcutaneous movable cyst c/w pilar cyst      Firm pink to brown papule c/w dermatofibroma      Pedunculated fleshy papule(s) c/w skin tag(s)       Evenly pigmented macule c/w junctional nevus     Mildly variegated pigmented, slightly irregular-bordered macule c/w mildly atypical nevus      Flesh colored to evenly pigmented papule c/w intradermal nevus       Pink pearly papule/plaque c/w basal cell carcinoma      Erythematous hyperkeratotic cursted plaque c/w SCC      Surgical scar with no sign of skin cancer recurrence      Open and closed comedones      Inflammatory papules and pustules      Verrucoid papule consistent consistent with wart     Erythematous eczematous patches and plaques     Dystrophic onycholytic nail with subungual debris c/w onychomycosis     Umbilicated papule    Erythematous-base heme-crusted tan verrucoid plaque consistent with inflamed seborrheic keratosis     Erythematous Silvery Scaling Plaque c/w Psoriasis     See annotation      Assessment / Plan:        AK (actinic keratosis)  Cryosurgery Procedure Note    Verbal consent from the patient is obtained and the patient is aware of the precancerous quality and need for treatment of these lesions. Liquid nitrogen cryosurgery is applied to the 1 actinic keratoses, as detailed in the physical exam, to produce a freeze injury. The patient is aware that blisters may form and is instructed on wound care with gentle cleansing and use of vaseline ointment to keep moist until healed. The patient is supplied a handout on cryosurgery and is instructed to call if lesions do not completely resolve.    Seborrheic dermatitis of face & scalp  -     ciclopirox 1 % shampoo; Apply ~5-10 mL to wet hair; lather, and leave on hair and scalp for ~3 minutes; rinse.  Dispense: 120 mL; Refill: 1  -     clobetasol (TEMOVATE) 0.05 % external solution; Use on scalp one - two times daily as needed for scaling or itching  Dispense: 50 mL; Refill: 3  -     ketoconazole (NIZORAL) 2 % cream; AAA bid left ear & right nose  Dispense: 60 g; Refill: 3    Seborrheic dermatitis is a common skin condition that mainly  affects your scalp. It causes scaly patches, red skin and stubborn dandruff. We don't yet know the exact cause of seborrheic dermatitis. It may be related to a yeast (fungus) called malassezia that is in the oil secretion on the skin. Some risk factors are sometimes associated with severe adult seborrhoeic dermatitis: Oily skin (seborrhoea), Familial tendency to seborrhoeic dermatitis or a family history of psoriasis, Lack of sleep, and stressful events.    Benign lichenoid keratosis- Right lower leg  Cryosurgery procedure note:    Verbal consent from the patient is obtained. Liquid nitrogen cryosurgery is applied to 2 lesions to produce a freeze injury. The patient is aware that blisters may form and is instructed on wound care with gentle cleansing and use of vaseline ointment to keep moist until healed. The patient is supplied a handout on cryosurgery and is instructed to call if lesions do not completely resolve.               Follow-up in about 2 months (around 3/22/2019) for CANDACE rodriguez MD.

## 2019-01-22 NOTE — PATIENT INSTRUCTIONS

## 2019-01-22 NOTE — LETTER
January 22, 2019      Ashlyn Mayorga MD  [address]  [address]  [facility] [address]           [facility] - Dermatology  [address]  [place] [address]  Phone: [phone]  Fax: [phone]          Patient: Marisela Siddiqui   MR Number: [id number]   YOB: 1975   Date of Visit: 1/22/2019       Dear Dr. Ashlyn Mayorga:    Thank you for referring Marisela Siddiqui to me for evaluation. Attached you will find relevant portions of my assessment and plan of care.    If you have questions, please do not hesitate to call me. I look forward to following Marisela Siddiqui along with you.    Sincerely,    Tg Bernal, NP    Enclosure  CC:  No Recipients    If you would like to receive this communication electronically, please contact [email or web address] or [phone] to request more information on P21 Link access.    For providers and/or their staff who would like to refer a patient to [facility], please contact us through our one-stop-shop provider referral line, [facility], at [phone].    If you feel you have received this communication in error or would no longer like to receive these types of communications, please e-mail [email or web address]

## 2019-02-13 ENCOUNTER — PATIENT MESSAGE (OUTPATIENT)
Dept: INTERNAL MEDICINE | Facility: CLINIC | Age: 44
End: 2019-02-13

## 2019-04-22 ENCOUNTER — OFFICE VISIT (OUTPATIENT)
Dept: DERMATOLOGY | Facility: CLINIC | Age: 44
End: 2019-04-22
Payer: COMMERCIAL

## 2019-04-22 DIAGNOSIS — L21.9 SEBORRHEIC DERMATITIS OF SCALP: Primary | ICD-10-CM

## 2019-04-22 DIAGNOSIS — Z12.83 SCREENING EXAM FOR SKIN CANCER: ICD-10-CM

## 2019-04-22 PROCEDURE — 99213 OFFICE O/P EST LOW 20 MIN: CPT | Mod: 25,S$GLB,, | Performed by: DERMATOLOGY

## 2019-04-22 PROCEDURE — 99213 PR OFFICE/OUTPT VISIT, EST, LEVL III, 20-29 MIN: ICD-10-PCS | Mod: 25,S$GLB,, | Performed by: DERMATOLOGY

## 2019-04-22 PROCEDURE — 11900 INJECT SKIN LESIONS </W 7: CPT | Mod: S$GLB,,, | Performed by: DERMATOLOGY

## 2019-04-22 PROCEDURE — 99999 PR PBB SHADOW E&M-EST. PATIENT-LVL II: CPT | Mod: PBBFAC,,, | Performed by: DERMATOLOGY

## 2019-04-22 PROCEDURE — 11900 PR INJECTION INTO SKIN LESIONS, UP TO 7: ICD-10-PCS | Mod: S$GLB,,, | Performed by: DERMATOLOGY

## 2019-04-22 PROCEDURE — 99999 PR PBB SHADOW E&M-EST. PATIENT-LVL II: ICD-10-PCS | Mod: PBBFAC,,, | Performed by: DERMATOLOGY

## 2019-04-22 RX ORDER — SERTRALINE HYDROCHLORIDE 50 MG/1
50 TABLET, FILM COATED ORAL DAILY
COMMUNITY
End: 2019-07-30 | Stop reason: ALTCHOICE

## 2019-04-22 NOTE — PROGRESS NOTES
Subjective:       Patient ID:  Marisela Siddiqui is a 43 y.o. female who presents for   Chief Complaint   Patient presents with    Spot     under L breast, L shoulder, R cheek, nose     Spot  - Initial  Affected locations: chest, face, nose and left shoulder  Relieving factors/Treatments tried: nothing    42 yo female here for evaluation of scalp dermatitis; has had for years; flaring due to recent life stressors (IVF, move, work).  Has it in 2 spots right now, otherwise clear. Using ciclopirox shampoo, clobetasol solution.      Had AKs treated by derm NP on nose; would like rechecked. Still feels rough.    Has spot she picked off under left breast that had a scab on it.        Review of Systems   Skin: Positive for activity-related sunscreen use. Negative for daily sunscreen use and recent sunburn.   Hematologic/Lymphatic: Does not bruise/bleed easily.        Objective:    Physical Exam   Constitutional: She appears well-developed and well-nourished. No distress.   Neurological: She is alert and oriented to person, place, and time. She is not disoriented.   Psychiatric: She has a normal mood and affect.   Skin:   Areas Examined (abnormalities noted in diagram):   Scalp / Hair Palpated and Inspected  Head / Face Inspection Performed  Neck Inspection Performed  Chest / Axilla Inspection Performed  Abdomen Inspection Performed  Back Inspection Performed  RUE Inspected  LUE Inspection Performed                   Diagram Legend     Erythematous scaling macule/papule c/w actinic keratosis       Vascular papule c/w angioma      Pigmented verrucoid papule/plaque c/w seborrheic keratosis      Yellow umbilicated papule c/w sebaceous hyperplasia      Irregularly shaped tan macule c/w lentigo     1-2 mm smooth white papules consistent with Milia      Movable subcutaneous cyst with punctum c/w epidermal inclusion cyst      Subcutaneous movable cyst c/w pilar cyst      Firm pink to brown papule c/w dermatofibroma       Pedunculated fleshy papule(s) c/w skin tag(s)      Evenly pigmented macule c/w junctional nevus     Mildly variegated pigmented, slightly irregular-bordered macule c/w mildly atypical nevus      Flesh colored to evenly pigmented papule c/w intradermal nevus       Pink pearly papule/plaque c/w basal cell carcinoma      Erythematous hyperkeratotic cursted plaque c/w SCC      Surgical scar with no sign of skin cancer recurrence      Open and closed comedones      Inflammatory papules and pustules      Verrucoid papule consistent consistent with wart     Erythematous eczematous patches and plaques     Dystrophic onycholytic nail with subungual debris c/w onychomycosis     Umbilicated papule    Erythematous-base heme-crusted tan verrucoid plaque consistent with inflamed seborrheic keratosis     Erythematous Silvery Scaling Plaque c/w Psoriasis     See annotation      Assessment / Plan:        Seborrheic dermatitis of scalp  Intralesional Kenalog 10mg/cc (0.8 cc total) injected into 2 lesions on the scalp today after obtaining verbal consent including risk of surrounding hypopigmentation. Patient tolerated procedure well.    Units: 1  NDC for Kenalog 10mg/cc:  5267-9606-76      Pt will call if needs refills ciclopirox shampoo  Her insurance sent her letter implying clobetasol may no longer be covered and advised topicort spray, betamethasone, or halobetasol an alternative. She does not need refills at this time.    Screening exam for skin cancer  Area(s) of previous AKs evaluated with no signs of recurrence.    Upper body skin examination performed today including at least 6 points as noted in physical examination. No lesions suspicious for malignancy noted.             Follow up if symptoms worsen or fail to improve.

## 2019-06-16 RX ORDER — BUPROPION HYDROCHLORIDE 100 MG/1
TABLET, EXTENDED RELEASE ORAL
Qty: 60 TABLET | Refills: 0 | Status: SHIPPED | OUTPATIENT
Start: 2019-06-16 | End: 2019-07-22 | Stop reason: SDUPTHER

## 2019-06-28 ENCOUNTER — PATIENT MESSAGE (OUTPATIENT)
Dept: OBSTETRICS AND GYNECOLOGY | Facility: CLINIC | Age: 44
End: 2019-06-28

## 2019-06-28 ENCOUNTER — PATIENT MESSAGE (OUTPATIENT)
Dept: DERMATOLOGY | Facility: CLINIC | Age: 44
End: 2019-06-28

## 2019-07-01 DIAGNOSIS — L21.9 SEBORRHEIC DERMATITIS: Primary | ICD-10-CM

## 2019-07-01 RX ORDER — FLUOCINOLONE ACETONIDE 0.11 MG/ML
OIL TOPICAL
Qty: 1 BOTTLE | Refills: 3 | Status: SHIPPED | OUTPATIENT
Start: 2019-07-01 | End: 2019-07-30 | Stop reason: ALTCHOICE

## 2019-07-03 RX ORDER — FLUOCINONIDE TOPICAL SOLUTION USP, 0.05% 0.5 MG/ML
SOLUTION TOPICAL 2 TIMES DAILY
Qty: 60 ML | Refills: 2 | Status: SHIPPED | OUTPATIENT
Start: 2019-07-03 | End: 2019-07-30 | Stop reason: ALTCHOICE

## 2019-07-03 NOTE — TELEPHONE ENCOUNTER
Returned [employer] phone call. Confirmed new medication appropriate for pt. She asked me to send new prescription for this. Medication approval sent to Dr. Kelley.----- Message from Yasmine Kelley MD sent at 7/3/2019 10:54 AM CDT -----  Contact: [employer]   Yes that's fine thanks!      ----- Message -----  From: Flory Portillo MA  Sent: 7/3/2019  10:20 AM  To: Yasmine Kelley MD    Would Fluocinonide solution be appropriate instead of the derma smooth scalp oil?   ----- Message -----  From: Gisell Rodriguez  Sent: 7/3/2019  10:01 AM  To: Warren Kelley -pt - Needs Advice    Reason for call: pharmacy is calling to speak with the nurse the prescription they sent over yesterday for the Derm smooth scalp oil isn't covered they wanted to leave a message for the provider for a preferred alternative medication to be called in they would like for you to call in Fluocinonide solution         Communication Preference: can you please can you please call pharmacy at  [phone]    Additional Information: none    BESSIE

## 2019-07-19 ENCOUNTER — TELEPHONE (OUTPATIENT)
Dept: DERMATOLOGY | Facility: CLINIC | Age: 44
End: 2019-07-19

## 2019-07-19 ENCOUNTER — PATIENT MESSAGE (OUTPATIENT)
Dept: OBSTETRICS AND GYNECOLOGY | Facility: CLINIC | Age: 44
End: 2019-07-19

## 2019-07-19 NOTE — TELEPHONE ENCOUNTER
Returned pt's call. Pt had to cancel due to flight delays. Informed pt Dr. Kelley is on vacation next week but we can reschedule her to the following week. Offered her Tuesday July 30. Pt accepted the 8:45am appt. Pt thanked me for my call and help. ----- Message from Nohelia Howe sent at 7/19/2019 10:28 AM CDT -----  Contact: self  Pt needs to reschedule due to missing her flight.  I offered her the next available and she declined.  Pt would like to be seen sooner than next available.    Pt can be reached at [phone]

## 2019-07-22 RX ORDER — BUPROPION HYDROCHLORIDE 100 MG/1
TABLET, EXTENDED RELEASE ORAL
Qty: 60 TABLET | Refills: 5 | Status: SHIPPED | OUTPATIENT
Start: 2019-07-22 | End: 2019-09-17

## 2019-07-30 ENCOUNTER — OFFICE VISIT (OUTPATIENT)
Dept: DERMATOLOGY | Facility: CLINIC | Age: 44
End: 2019-07-30
Payer: COMMERCIAL

## 2019-07-30 ENCOUNTER — OFFICE VISIT (OUTPATIENT)
Dept: OBSTETRICS AND GYNECOLOGY | Facility: CLINIC | Age: 44
End: 2019-07-30
Payer: COMMERCIAL

## 2019-07-30 VITALS
BODY MASS INDEX: 26.41 KG/M2 | DIASTOLIC BLOOD PRESSURE: 70 MMHG | HEIGHT: 63 IN | SYSTOLIC BLOOD PRESSURE: 120 MMHG | WEIGHT: 149.06 LBS

## 2019-07-30 DIAGNOSIS — L21.9 SEBORRHEIC DERMATITIS OF SCALP: ICD-10-CM

## 2019-07-30 DIAGNOSIS — N90.7 INCLUSION CYST OF VULVA: Primary | ICD-10-CM

## 2019-07-30 DIAGNOSIS — L74.0 HEAT RASH: ICD-10-CM

## 2019-07-30 DIAGNOSIS — L21.9 SEBORRHEIC DERMATITIS: Primary | ICD-10-CM

## 2019-07-30 PROBLEM — O09.521 ELDERLY MULTIGRAVIDA IN FIRST TRIMESTER: Status: RESOLVED | Noted: 2018-11-15 | Resolved: 2019-07-30

## 2019-07-30 PROBLEM — O09.819 HIGH RISK PREGNANCY DUE TO ASSISTED REPRODUCTIVE TECHNOLOGY: Status: RESOLVED | Noted: 2018-11-15 | Resolved: 2019-07-30

## 2019-07-30 PROCEDURE — 99999 PR PBB SHADOW E&M-EST. PATIENT-LVL III: ICD-10-PCS | Mod: PBBFAC,,, | Performed by: NURSE PRACTITIONER

## 2019-07-30 PROCEDURE — 3008F BODY MASS INDEX DOCD: CPT | Mod: CPTII,S$GLB,, | Performed by: NURSE PRACTITIONER

## 2019-07-30 PROCEDURE — 99213 PR OFFICE/OUTPT VISIT, EST, LEVL III, 20-29 MIN: ICD-10-PCS | Mod: S$GLB,,, | Performed by: NURSE PRACTITIONER

## 2019-07-30 PROCEDURE — 99999 PR PBB SHADOW E&M-EST. PATIENT-LVL II: CPT | Mod: PBBFAC,,, | Performed by: DERMATOLOGY

## 2019-07-30 PROCEDURE — 99213 OFFICE O/P EST LOW 20 MIN: CPT | Mod: S$GLB,,, | Performed by: DERMATOLOGY

## 2019-07-30 PROCEDURE — 99999 PR PBB SHADOW E&M-EST. PATIENT-LVL III: CPT | Mod: PBBFAC,,, | Performed by: NURSE PRACTITIONER

## 2019-07-30 PROCEDURE — 3008F PR BODY MASS INDEX (BMI) DOCUMENTED: ICD-10-PCS | Mod: CPTII,S$GLB,, | Performed by: NURSE PRACTITIONER

## 2019-07-30 PROCEDURE — 99213 OFFICE O/P EST LOW 20 MIN: CPT | Mod: S$GLB,,, | Performed by: NURSE PRACTITIONER

## 2019-07-30 PROCEDURE — 99999 PR PBB SHADOW E&M-EST. PATIENT-LVL II: ICD-10-PCS | Mod: PBBFAC,,, | Performed by: DERMATOLOGY

## 2019-07-30 PROCEDURE — 99213 PR OFFICE/OUTPT VISIT, EST, LEVL III, 20-29 MIN: ICD-10-PCS | Mod: S$GLB,,, | Performed by: DERMATOLOGY

## 2019-07-30 RX ORDER — CLOBETASOL PROPIONATE 0.46 MG/ML
SOLUTION TOPICAL
Qty: 50 ML | Refills: 3 | Status: SHIPPED | OUTPATIENT
Start: 2019-07-30 | End: 2019-09-17

## 2019-07-30 RX ORDER — KETOCONAZOLE 20 MG/ML
SHAMPOO, SUSPENSION TOPICAL DAILY
Qty: 120 ML | Refills: 2 | Status: SHIPPED | OUTPATIENT
Start: 2019-07-30

## 2019-07-30 RX ORDER — DEXTROAMPHETAMINE SACCHARATE, AMPHETAMINE ASPARTATE MONOHYDRATE, DEXTROAMPHETAMINE SULFATE AND AMPHETAMINE SULFATE 2.5; 2.5; 2.5; 2.5 MG/1; MG/1; MG/1; MG/1
10 CAPSULE, EXTENDED RELEASE ORAL EVERY MORNING
COMMUNITY
End: 2019-09-17

## 2019-07-30 RX ORDER — FLUOCINOLONE ACETONIDE 0.11 MG/ML
OIL TOPICAL
Qty: 1 BOTTLE | Refills: 3 | Status: SHIPPED | OUTPATIENT
Start: 2019-07-30 | End: 2019-08-06 | Stop reason: SDUPTHER

## 2019-07-30 RX ORDER — CICLOPIROX 1 G/100ML
SHAMPOO TOPICAL
Qty: 120 ML | Refills: 3 | Status: SHIPPED | OUTPATIENT
Start: 2019-07-30 | End: 2019-09-17

## 2019-07-30 NOTE — PROGRESS NOTES
CC: labial cyst?    HPI: Pt is a 43 y.o.  female who presents for a possible labial cyst? First noticed bump a few months ago. It has not changed in size. States it is on the left side in the same area as her Bartholin's gland. Attempted to squeeze it but was unable to. Thinks she caused more irritation doing that. In process of getting egg donor in [place]. Asking for local MARISOL recommendations. Had AB last year in [place] d/t pregnancy with Trisomy 18. [facility] but still upset it was deemed unethical here.     ROS:  GENERAL: Feeling well overall. Denies fever or chills.   SKIN: Denies rash or lesions.   HEAD: Denies head injury or headache.   NODES: Denies enlarged lymph nodes.   CHEST: Denies chest pain or shortness of breath.   CARDIOVASCULAR: Denies palpitations or left sided chest pain.   ABDOMEN: No abdominal pain, constipation, diarrhea, nausea, vomiting or rectal bleeding.   URINARY: No dysuria, hematuria, or burning on urination.  REPRODUCTIVE: See HPI.   BREASTS: Denies pain, lumps, or nipple discharge.   HEMATOLOGIC: No easy bruisability or excessive bleeding.   MUSCULOSKELETAL: Denies joint pain or swelling.   NEUROLOGIC: Denies syncope or weakness.   PSYCHIATRIC: Denies depression, anxiety or mood swings.    PE:   APPEARANCE: Well nourished, well developed, White female in no acute distress.  VULVA: No lesions. Normal external female genitalia. Pinpoint vulvar cyst palpated on left lower labia majora.   URETHRAL MEATUS: Normal size and location, no lesions, no prolapse.  URETHRA: No masses, tenderness, or prolapse.  VAGINA: Moist. No lesions or lacerations noted. No abnormal discharge present. No odor present.   CERVIX: No lesions or discharge. No cervical motion tenderness.   UTERUS: Normal size, regular shape, mobile, non-tender.  ADNEXA: No tenderness. No fullness or masses palpated in the adnexal regions.   ANUS PERINEUM: Normal.      Diagnosis:  1. Inclusion cyst of vulva         Plan:   1. Reassurance given to pt. RTC with increased size and/or s/s of infection  2. MARISOL recommendations given    Total duration face to face visit time 15 minutes.     Follow-up prn

## 2019-07-30 NOTE — PROGRESS NOTES
Subjective:       Patient ID:  Marisela Siddiqui is a 43 y.o. female who presents for   Chief Complaint   Patient presents with    Hair/Scalp Problem     Family hx of psoriasis in father. Currently using ciclopirox shampoo and clobetasol solution daily. She had ILK 10 to the scalp the last time around but this did not help.    She was sent Dermasmooth oil but did not yet pick it up.    Still very itchy and stressed by the hair condition. Work is an exacerbating factor for her stress at this time.  Getting a puppy this weekend from [facility].    She also noted monomorphic papules over her sternum. Assymptomatic and patient notes that she runs outside.     Seborrheic Dermatitis  - Follow-up  Symptom course: unchanged  Currently using: ciclopiroc shampoo, clobetasol solution.  Affected locations: scalp and left ear  Signs / symptoms: itching  Severity: moderate        Review of Systems   Skin: Positive for itching and rash. Negative for tendency to form keloidal scars.   Psychiatric/Behavioral: Positive for high stress.   Hematologic/Lymphatic: Does not bruise/bleed easily.        Objective:    Physical Exam   Constitutional: She appears well-developed and well-nourished. No distress.   Neurological: She is alert and oriented to person, place, and time. She is not disoriented.   Psychiatric: She has a normal mood and affect.   Skin:   Areas Examined (abnormalities noted in diagram):   Scalp / Hair Palpated and Inspected  Head / Face Inspection Performed  Neck Inspection Performed  Chest / Axilla Inspection Performed                   Diagram Legend     Erythematous scaling macule/papule c/w actinic keratosis       Vascular papule c/w angioma      Pigmented verrucoid papule/plaque c/w seborrheic keratosis      Yellow umbilicated papule c/w sebaceous hyperplasia      Irregularly shaped tan macule c/w lentigo     1-2 mm smooth white papules consistent with Milia      Movable subcutaneous cyst with punctum c/w epidermal  inclusion cyst      Subcutaneous movable cyst c/w pilar cyst      Firm pink to brown papule c/w dermatofibroma      Pedunculated fleshy papule(s) c/w skin tag(s)      Evenly pigmented macule c/w junctional nevus     Mildly variegated pigmented, slightly irregular-bordered macule c/w mildly atypical nevus      Flesh colored to evenly pigmented papule c/w intradermal nevus       Pink pearly papule/plaque c/w basal cell carcinoma      Erythematous hyperkeratotic cursted plaque c/w SCC      Surgical scar with no sign of skin cancer recurrence      Open and closed comedones      Inflammatory papules and pustules      Verrucoid papule consistent consistent with wart     Erythematous eczematous patches and plaques     Dystrophic onycholytic nail with subungual debris c/w onychomycosis     Umbilicated papule    Erythematous-base heme-crusted tan verrucoid plaque consistent with inflamed seborrheic keratosis     Erythematous Silvery Scaling Plaque c/w Psoriasis     See annotation      Assessment / Plan:        Seborrheic dermatitis - will try new shampoo RX to rotate use with ciclopirox -     Pt to  Dermasmooth - this has worked well for her father who has psoriasis    Pt without any other skin involvement of rash    Discussed stress management  Getting puppy; starting to run again  Adderrall can make itching worse - she is not taking often    -     ketoconazole (NIZORAL) 2 % shampoo; Apply topically once daily.  Dispense: 120 mL; Refill: 2  -     DERMA-SMOOTHE/FS SCALP OIL 0.01 % Oil; Apply oil to damp scalp nightly and cover with shower cap.  Dispense: 1 Bottle; Refill: 3  -     ciclopirox 1 % shampoo; Apply ~5-10 mL to wet hair; lather, and leave on hair and scalp for ~3 minutes; rinse.  Dispense: 120 mL; Refill: 3  -     clobetasol (TEMOVATE) 0.05 % external solution; Use on scalp one - two times daily as needed for scaling or itching  Dispense: 50 mL; Refill: 3    Heat rash  Reassured  Cortisone cream            Follow up if symptoms worsen or fail to improve.

## 2019-07-31 NOTE — TELEPHONE ENCOUNTER
Spoke with [facility] to let them know provider is out of office today and will return tomorrow and will be able to change the prescription to something different.

## 2019-08-01 DIAGNOSIS — L21.9 SEBORRHEIC DERMATITIS OF SCALP: Primary | ICD-10-CM

## 2019-08-01 RX ORDER — FLUOCINONIDE TOPICAL SOLUTION USP, 0.05% 0.5 MG/ML
SOLUTION TOPICAL 2 TIMES DAILY
Qty: 60 ML | Refills: 3 | Status: SHIPPED | OUTPATIENT
Start: 2019-08-01

## 2019-08-06 ENCOUNTER — PATIENT MESSAGE (OUTPATIENT)
Dept: INTERNAL MEDICINE | Facility: CLINIC | Age: 44
End: 2019-08-06

## 2019-08-06 DIAGNOSIS — L21.9 SEBORRHEIC DERMATITIS: ICD-10-CM

## 2019-08-06 DIAGNOSIS — Z3A.01 LESS THAN 8 WEEKS GESTATION OF PREGNANCY: Primary | ICD-10-CM

## 2019-08-06 RX ORDER — FLUOCINOLONE ACETONIDE 0.11 MG/ML
OIL TOPICAL
Qty: 1 BOTTLE | Refills: 3 | Status: SHIPPED | OUTPATIENT
Start: 2019-08-06 | End: 2019-09-17

## 2019-08-06 NOTE — TELEPHONE ENCOUNTER
Informed pt that provider sent in an alternative solution since the clobetasol was not covered by insurance.   Pt verb understanding and asked for a refill on the derma smoothe

## 2019-08-08 ENCOUNTER — TELEPHONE (OUTPATIENT)
Dept: OBSTETRICS AND GYNECOLOGY | Facility: CLINIC | Age: 44
End: 2019-08-08

## 2019-08-08 DIAGNOSIS — Z32.01 POSITIVE PREGNANCY TEST: Primary | ICD-10-CM

## 2019-08-09 ENCOUNTER — TELEPHONE (OUTPATIENT)
Dept: OBSTETRICS AND GYNECOLOGY | Facility: CLINIC | Age: 44
End: 2019-08-09

## 2019-08-09 ENCOUNTER — PATIENT MESSAGE (OUTPATIENT)
Dept: OBSTETRICS AND GYNECOLOGY | Facility: CLINIC | Age: 44
End: 2019-08-09

## 2019-08-09 DIAGNOSIS — O09.299 TRISOMY 18 IN CHILD OF PRIOR PREGNANCY, CURRENTLY PREGNANT: ICD-10-CM

## 2019-08-09 DIAGNOSIS — Z32.01 POSITIVE PREGNANCY TEST: Primary | ICD-10-CM

## 2019-08-12 ENCOUNTER — OFFICE VISIT (OUTPATIENT)
Dept: MATERNAL FETAL MEDICINE | Facility: CLINIC | Age: 44
End: 2019-08-12
Attending: OBSTETRICS & GYNECOLOGY
Payer: COMMERCIAL

## 2019-08-12 ENCOUNTER — PROCEDURE VISIT (OUTPATIENT)
Dept: MATERNAL FETAL MEDICINE | Facility: CLINIC | Age: 44
End: 2019-08-12
Payer: COMMERCIAL

## 2019-08-12 DIAGNOSIS — Z32.01 POSITIVE PREGNANCY TEST: ICD-10-CM

## 2019-08-12 DIAGNOSIS — O09.299 TRISOMY 18 IN CHILD OF PRIOR PREGNANCY, CURRENTLY PREGNANT: ICD-10-CM

## 2019-08-12 DIAGNOSIS — O31.10X0 VANISHING TWIN SYNDROME: ICD-10-CM

## 2019-08-12 PROCEDURE — 76802 OB US < 14 WKS ADDL FETUS: CPT | Mod: S$GLB,,, | Performed by: OBSTETRICS & GYNECOLOGY

## 2019-08-12 PROCEDURE — 99204 OFFICE O/P NEW MOD 45 MIN: CPT | Mod: 25,S$GLB,, | Performed by: OBSTETRICS & GYNECOLOGY

## 2019-08-12 PROCEDURE — 99204 PR OFFICE/OUTPT VISIT, NEW, LEVL IV, 45-59 MIN: ICD-10-PCS | Mod: 25,S$GLB,, | Performed by: OBSTETRICS & GYNECOLOGY

## 2019-08-12 PROCEDURE — 99999 PR PBB SHADOW E&M-EST. PATIENT-LVL II: CPT | Mod: PBBFAC,,, | Performed by: OBSTETRICS & GYNECOLOGY

## 2019-08-12 PROCEDURE — 76802 PR US, OB <14WKS, TRANSABD, EA ADDL GESTATION: ICD-10-PCS | Mod: S$GLB,,, | Performed by: OBSTETRICS & GYNECOLOGY

## 2019-08-12 PROCEDURE — 76801 OB US < 14 WKS SINGLE FETUS: CPT | Mod: S$GLB,,, | Performed by: OBSTETRICS & GYNECOLOGY

## 2019-08-12 PROCEDURE — 76801 PR US, OB <14WKS, TRANSABD, SINGLE GESTATION: ICD-10-PCS | Mod: S$GLB,,, | Performed by: OBSTETRICS & GYNECOLOGY

## 2019-08-12 PROCEDURE — 99999 PR PBB SHADOW E&M-EST. PATIENT-LVL II: ICD-10-PCS | Mod: PBBFAC,,, | Performed by: OBSTETRICS & GYNECOLOGY

## 2019-08-13 PROBLEM — O31.10X0 VANISHING TWIN SYNDROME: Status: ACTIVE | Noted: 2019-08-13

## 2019-08-13 NOTE — PROGRESS NOTES
Chief complaint: AMA, Vanishing twin, Multiple pregnancy loss     Provider requesting consultation: Dr. Burch    44 y.o. [id number] Unknown EGA.    PMH:  Past Medical History:   Diagnosis Date    Miscarriage     Miscarriage within last 12 months        PObHx:  OB History    Para Term  AB Living   4 0 0   4 0   SAB TAB Ectopic Multiple Live Births     1            # Outcome Date GA Lbr Alfredo/2nd Weight Sex Delivery Anes PTL Lv   4 TAB 2018     TAB         Birth Comments: Trisomy 18   3 AB            2 AB            1 AB                PSH:  Past Surgical History:   Procedure Laterality Date    ANTERIOR CRUCIATE LIGAMENT REPAIR Right 2012    DILATION AND EVACUATION  2018    oocyte retrievals      SPINAL FUSION  2013    thoracic? due to burst fracture of vertebrae       Family history:family history includes Brain cancer in her maternal grandmother; Diabetes in her mother and paternal grandfather; Irritable bowel syndrome in her father; Miscarriages / Stillbirths in her paternal grandmother.    Social history: reports that she has never smoked. She has never used smokeless tobacco. She reports that she drinks alcohol. She reports that she does not use drugs.    A fetal ultrasound was completed today.  See details in imaging section of EPIC.  In addition, a rather extensive history was obtained 2/2 her history of multiple losses.    She has had a negative thrombophilia workup although the records are not available.  She travels frequently for work and is splitting OB care between [place] and [facility].  We will attempt to get all records.    The patient has a vanishing twin of a di-di twin pregnancy.  Although all of he other pregnancies were conceived by assisted reproductive technology, this gestation is spontaneous.  I explained to her that vanishing twins occurred very frequently and did not put the pregnancy at greater risk especially in the 1st trimester.  They occur more frequently with  IVF pregnancies but are also common in naturally occurring twins.    If the fetus is absorbed completely, there are usually no further complications to the pregnancy, other than first trimester vaginal bleeding. However, if the event occurs in the second or third trimester, serious complications may include premature labor, infection due to the death of the fetus, and hemorrhage.   The vanished twin can die owing to a poorly implanted placenta, a developmental anomaly that may cause major organs to fail or to be missing completely, or there may be a chrosome abnormality incompatible with life. Frequently the twin is a blighted ovum, one that never developed beyond the very earliest stages of embryogenesis.  The exact frequency of each of the above etiologies is unknown.  In her case 2/2 AMA the most likely cause isaneuploidy.      We deferred her age related consultation to her next visit. However, note is made of the fact that all of her prior pregnancy losses were associated with aneuploidy.  One is confirmed as a T-18, the others she was uncertain as to the exact abnormality.  She started to become pregnant at age 39.  I discussed that although the risk of recurrence of aneuploidy is low, her age related risk is significantly higher than the recurrence risk and is the operational factor in her case.  At her age, with a twin gestation, the risk of aneuploidy of one or both twins would be 1 in 11.  This makes it very likely that the vanishing twin is 2/2 aneuploidy and that the surviving twin is also affected.  She will obtain a Materni-21 screen at 12 weeks.  We also will consider CVS at that time.  I will see her back at 8 weeks for viability.    -We also reviewed that AMA is associated with an increased risk of adverse pregnancy outcomes such as miscarriage, ectopic pregnancy, diabetes, hypertension, and other adverse outcomes. There is also an increased risk of stillibirth, particularly in women age 40 and  above.    -MF at [facility] recommends the following for women 35 and older at their FUNMILAYO:   -Detailed fetal anatomic survey at 19 to 20 weeks gestation   -Ultrasound for fetal growth at 32 to 34 weeks gestation   -In women 40 years and older at FUNMILAYO, recommend weekly MVP and twice weekly NST beginning at 32 weeks gestation.   -In women 40 years and older at FUNMILAYO, recommend delivery between 39 and 40 weeks gestation.      The patient was given an opportunity to ask questions about management and the diease process.  She expressed an understanding of and agreement to the above impression and plan. All questions were answered to her satisfaction.  She was given contact information to the [facility] clinic to address further concerns.      The approximate physician face-to-face time was 45 minutes. The majority of the time (>50%) was spent on counseling of the patient or coordination of care.

## 2019-08-26 ENCOUNTER — INITIAL CONSULT (OUTPATIENT)
Dept: MATERNAL FETAL MEDICINE | Facility: CLINIC | Age: 44
End: 2019-08-26
Attending: OBSTETRICS & GYNECOLOGY
Payer: COMMERCIAL

## 2019-08-26 ENCOUNTER — PROCEDURE VISIT (OUTPATIENT)
Dept: MATERNAL FETAL MEDICINE | Facility: CLINIC | Age: 44
End: 2019-08-26
Payer: COMMERCIAL

## 2019-08-26 VITALS
WEIGHT: 144.19 LBS | BODY MASS INDEX: 25.54 KG/M2 | SYSTOLIC BLOOD PRESSURE: 126 MMHG | DIASTOLIC BLOOD PRESSURE: 84 MMHG

## 2019-08-26 DIAGNOSIS — O31.10X0 VANISHING TWIN SYNDROME: ICD-10-CM

## 2019-08-26 DIAGNOSIS — Z32.01 POSITIVE PREGNANCY TEST: ICD-10-CM

## 2019-08-26 DIAGNOSIS — O09.299 TRISOMY 18 IN CHILD OF PRIOR PREGNANCY, CURRENTLY PREGNANT: ICD-10-CM

## 2019-08-26 DIAGNOSIS — O09.819 HIGH RISK PREGNANCY DUE TO ASSISTED REPRODUCTIVE TECHNOLOGY: ICD-10-CM

## 2019-08-26 DIAGNOSIS — Z36.82 ENCOUNTER FOR ANTENATAL SCREENING FOR NUCHAL TRANSLUCENCY: Primary | ICD-10-CM

## 2019-08-26 DIAGNOSIS — O09.521 ELDERLY MULTIGRAVIDA IN FIRST TRIMESTER: Primary | ICD-10-CM

## 2019-08-26 PROCEDURE — 99999 PR PBB SHADOW E&M-EST. PATIENT-LVL III: CPT | Mod: PBBFAC,,, | Performed by: OBSTETRICS & GYNECOLOGY

## 2019-08-26 PROCEDURE — 99999 PR PBB SHADOW E&M-EST. PATIENT-LVL III: ICD-10-PCS | Mod: PBBFAC,,, | Performed by: OBSTETRICS & GYNECOLOGY

## 2019-08-26 PROCEDURE — 99213 OFFICE O/P EST LOW 20 MIN: CPT | Mod: 25,S$GLB,, | Performed by: OBSTETRICS & GYNECOLOGY

## 2019-08-26 PROCEDURE — 99213 PR OFFICE/OUTPT VISIT, EST, LEVL III, 20-29 MIN: ICD-10-PCS | Mod: 25,S$GLB,, | Performed by: OBSTETRICS & GYNECOLOGY

## 2019-08-26 PROCEDURE — 76801 OB US < 14 WKS SINGLE FETUS: CPT | Mod: S$GLB,,, | Performed by: OBSTETRICS & GYNECOLOGY

## 2019-08-26 PROCEDURE — 76801 PR US, OB <14WKS, TRANSABD, SINGLE GESTATION: ICD-10-PCS | Mod: S$GLB,,, | Performed by: OBSTETRICS & GYNECOLOGY

## 2019-08-26 NOTE — PROGRESS NOTES
See viewpoint US report    Discussed today's U/S and plans for genetic testing.  The patient desires NT and Materni-21 if NT normal or CVS if abnormal.    The patient was given an opportunity to ask questions about management and the diease process.  She expressed an understanding of and agreement to the above impression and plan. All questions were answered to her satisfaction.  She was given contact information to the [facility] clinic to address further concerns.      The approximate physician face-to-face time was 15 minutes. The majority of the time (>50%) was spent on counseling of the patient or coordination of care.

## 2019-08-27 NOTE — PROGRESS NOTES
See viewpoint US report     Discussed today's U/S and plans for genetic testing.  The patient desires NT and Materni-21 if NT normal or CVS if abnormal.     The patient was given an opportunity to ask questions about management and the diease process.  She expressed an understanding of and agreement to the above impression and plan. All questions were answered to her satisfaction.  She was given contact information to the [facility] clinic to address further concerns.       The approximate physician face-to-face time was 15 minutes. The majority of the time (>50%) was spent on counseling of the patient or coordination of care.

## 2019-09-16 ENCOUNTER — PROCEDURE VISIT (OUTPATIENT)
Dept: MATERNAL FETAL MEDICINE | Facility: CLINIC | Age: 44
End: 2019-09-16
Payer: COMMERCIAL

## 2019-09-16 ENCOUNTER — INITIAL CONSULT (OUTPATIENT)
Dept: MATERNAL FETAL MEDICINE | Facility: CLINIC | Age: 44
End: 2019-09-16
Attending: OBSTETRICS & GYNECOLOGY
Payer: COMMERCIAL

## 2019-09-16 VITALS
BODY MASS INDEX: 26.63 KG/M2 | DIASTOLIC BLOOD PRESSURE: 82 MMHG | WEIGHT: 150.38 LBS | SYSTOLIC BLOOD PRESSURE: 110 MMHG

## 2019-09-16 DIAGNOSIS — O09.291 PREVIOUS PREGNANCY COMPLICATED BY CHROMOSOMAL ABNORMALITY IN FIRST TRIMESTER, ANTEPARTUM: ICD-10-CM

## 2019-09-16 DIAGNOSIS — Z36.82 ENCOUNTER FOR ANTENATAL SCREENING FOR NUCHAL TRANSLUCENCY: ICD-10-CM

## 2019-09-16 DIAGNOSIS — Z36.89 ENCOUNTER FOR FETAL ANATOMIC SURVEY: Primary | ICD-10-CM

## 2019-09-16 PROCEDURE — 76813 OB US NUCHAL MEAS 1 GEST: CPT | Mod: S$GLB,,, | Performed by: OBSTETRICS & GYNECOLOGY

## 2019-09-16 PROCEDURE — 76811 OB US DETAILED SNGL FETUS: CPT | Mod: S$GLB,,, | Performed by: OBSTETRICS & GYNECOLOGY

## 2019-09-16 PROCEDURE — 99999 PR PBB SHADOW E&M-EST. PATIENT-LVL II: CPT | Mod: PBBFAC,,, | Performed by: OBSTETRICS & GYNECOLOGY

## 2019-09-16 PROCEDURE — 99499 NO LOS: ICD-10-PCS | Mod: S$GLB,,, | Performed by: OBSTETRICS & GYNECOLOGY

## 2019-09-16 PROCEDURE — 99999 PR PBB SHADOW E&M-EST. PATIENT-LVL II: ICD-10-PCS | Mod: PBBFAC,,, | Performed by: OBSTETRICS & GYNECOLOGY

## 2019-09-16 PROCEDURE — 76811 PR US, OB FETAL EVAL & EXAM, TRANSABDOM,FIRST GESTATION: ICD-10-PCS | Mod: S$GLB,,, | Performed by: OBSTETRICS & GYNECOLOGY

## 2019-09-16 PROCEDURE — 76813 PR US, OB NUCHAL, TRANSABDOM/TRANSVAG, FIRST GESTATION: ICD-10-PCS | Mod: S$GLB,,, | Performed by: OBSTETRICS & GYNECOLOGY

## 2019-09-16 PROCEDURE — 99499 UNLISTED E&M SERVICE: CPT | Mod: S$GLB,,, | Performed by: OBSTETRICS & GYNECOLOGY

## 2019-09-16 NOTE — PROGRESS NOTES
The patient given information to contact [employer] while here in clinic. Pt declined and reports she will figure out the cost after but wants the get the screening done no matter the cost. Pt understands that is could be completely covered by insurance or cost up to $2700 out of pocket. The appropriate requisition form was given to the patient. She was directed on the location of the lab and informed that lab results take approximately 7-10 business days. Pt verbalized understanding of information. Pt is requesting 8 week and today's ultrasound report be faxed to Dr. Vielka Hughes in [place]. Release of Medical information on file per pt.

## 2019-09-17 ENCOUNTER — INITIAL PRENATAL (OUTPATIENT)
Dept: OBSTETRICS AND GYNECOLOGY | Facility: CLINIC | Age: 44
End: 2019-09-17
Payer: COMMERCIAL

## 2019-09-17 ENCOUNTER — PATIENT MESSAGE (OUTPATIENT)
Dept: ADMINISTRATIVE | Facility: OTHER | Age: 44
End: 2019-09-17

## 2019-09-17 ENCOUNTER — LAB VISIT (OUTPATIENT)
Dept: LAB | Facility: OTHER | Age: 44
End: 2019-09-17
Attending: OBSTETRICS & GYNECOLOGY
Payer: COMMERCIAL

## 2019-09-17 VITALS — SYSTOLIC BLOOD PRESSURE: 114 MMHG | BODY MASS INDEX: 26.75 KG/M2 | WEIGHT: 151 LBS | DIASTOLIC BLOOD PRESSURE: 68 MMHG

## 2019-09-17 DIAGNOSIS — O09.91 HIGH-RISK PREGNANCY IN FIRST TRIMESTER: ICD-10-CM

## 2019-09-17 DIAGNOSIS — N91.2 AMENORRHEA: ICD-10-CM

## 2019-09-17 DIAGNOSIS — O09.91 HIGH-RISK PREGNANCY IN FIRST TRIMESTER: Primary | ICD-10-CM

## 2019-09-17 LAB
ABO + RH BLD: NORMAL
ANION GAP SERPL CALC-SCNC: 11 MMOL/L (ref 8–16)
BASOPHILS # BLD AUTO: 0.02 K/UL (ref 0–0.2)
BASOPHILS NFR BLD: 0.3 % (ref 0–1.9)
BLD GP AB SCN CELLS X3 SERPL QL: NORMAL
BUN SERPL-MCNC: 11 MG/DL (ref 6–20)
CALCIUM SERPL-MCNC: 9.5 MG/DL (ref 8.7–10.5)
CHLORIDE SERPL-SCNC: 107 MMOL/L (ref 95–110)
CO2 SERPL-SCNC: 20 MMOL/L (ref 23–29)
CREAT SERPL-MCNC: 0.6 MG/DL (ref 0.5–1.4)
DIFFERENTIAL METHOD: NORMAL
EOSINOPHIL # BLD AUTO: 0.2 K/UL (ref 0–0.5)
EOSINOPHIL NFR BLD: 3.3 % (ref 0–8)
ERYTHROCYTE [DISTWIDTH] IN BLOOD BY AUTOMATED COUNT: 11.6 % (ref 11.5–14.5)
EST. GFR  (AFRICAN AMERICAN): >60 ML/MIN/1.73 M^2
EST. GFR  (NON AFRICAN AMERICAN): >60 ML/MIN/1.73 M^2
GLUCOSE SERPL-MCNC: 72 MG/DL (ref 70–110)
HCT VFR BLD AUTO: 39.9 % (ref 37–48.5)
HGB BLD-MCNC: 13.4 G/DL (ref 12–16)
IMM GRANULOCYTES # BLD AUTO: 0.03 K/UL (ref 0–0.04)
IMM GRANULOCYTES NFR BLD AUTO: 0.4 % (ref 0–0.5)
LYMPHOCYTES # BLD AUTO: 1.9 K/UL (ref 1–4.8)
LYMPHOCYTES NFR BLD: 25.7 % (ref 18–48)
MCH RBC QN AUTO: 28.5 PG (ref 27–31)
MCHC RBC AUTO-ENTMCNC: 33.6 G/DL (ref 32–36)
MCV RBC AUTO: 85 FL (ref 82–98)
MONOCYTES # BLD AUTO: 0.5 K/UL (ref 0.3–1)
MONOCYTES NFR BLD: 7.2 % (ref 4–15)
NEUTROPHILS # BLD AUTO: 4.6 K/UL (ref 1.8–7.7)
NEUTROPHILS NFR BLD: 63.1 % (ref 38–73)
NRBC BLD-RTO: 0 /100 WBC
PLATELET # BLD AUTO: 209 K/UL (ref 150–350)
PMV BLD AUTO: 9.3 FL (ref 9.2–12.9)
POTASSIUM SERPL-SCNC: 3.6 MMOL/L (ref 3.5–5.1)
RBC # BLD AUTO: 4.71 M/UL (ref 4–5.4)
SODIUM SERPL-SCNC: 138 MMOL/L (ref 136–145)
WBC # BLD AUTO: 7.35 K/UL (ref 3.9–12.7)

## 2019-09-17 PROCEDURE — 0500F PR INITIAL PRENATAL CARE VISIT: ICD-10-PCS | Mod: S$GLB,,, | Performed by: OBSTETRICS & GYNECOLOGY

## 2019-09-17 PROCEDURE — 86703 HIV-1/HIV-2 1 RESULT ANTBDY: CPT

## 2019-09-17 PROCEDURE — 99999 PR PBB SHADOW E&M-EST. PATIENT-LVL II: ICD-10-PCS | Mod: PBBFAC,,, | Performed by: OBSTETRICS & GYNECOLOGY

## 2019-09-17 PROCEDURE — 36415 COLL VENOUS BLD VENIPUNCTURE: CPT

## 2019-09-17 PROCEDURE — 85025 COMPLETE CBC W/AUTO DIFF WBC: CPT

## 2019-09-17 PROCEDURE — 86592 SYPHILIS TEST NON-TREP QUAL: CPT

## 2019-09-17 PROCEDURE — 86762 RUBELLA ANTIBODY: CPT

## 2019-09-17 PROCEDURE — 87340 HEPATITIS B SURFACE AG IA: CPT

## 2019-09-17 PROCEDURE — 0500F INITIAL PRENATAL CARE VISIT: CPT | Mod: S$GLB,,, | Performed by: OBSTETRICS & GYNECOLOGY

## 2019-09-17 PROCEDURE — 80048 BASIC METABOLIC PNL TOTAL CA: CPT

## 2019-09-17 PROCEDURE — 99999 PR PBB SHADOW E&M-EST. PATIENT-LVL II: CPT | Mod: PBBFAC,,, | Performed by: OBSTETRICS & GYNECOLOGY

## 2019-09-17 PROCEDURE — 86850 RBC ANTIBODY SCREEN: CPT

## 2019-09-18 LAB
HBV SURFACE AG SERPL QL IA: NEGATIVE
HIV 1+2 AB+HIV1 P24 AG SERPL QL IA: NEGATIVE
RUBV IGG SER-ACNC: 27.2 IU/ML
RUBV IGG SER-IMP: REACTIVE

## 2019-09-19 LAB — RPR SER QL: NORMAL

## 2019-09-20 ENCOUNTER — TELEPHONE (OUTPATIENT)
Dept: MATERNAL FETAL MEDICINE | Facility: CLINIC | Age: 44
End: 2019-09-20

## 2019-09-28 NOTE — PROGRESS NOTES
This is patient's initial OB visit with me.  Complicated OB hx with hx of Trisomy 18 in the past.  Pt is followed by MFM but here to establish OB care.  Pt reports being nervous.  No other issues.  No vaginal bleeding or cramping.  NT normal. [id number] is pending.  1st trim labs ordered. Interested in [facility].  Orders placed.  Given pregnancy A to Z book and other resources.  Given high risk pregnancy (due to history), will have RTC in 2 weeks for follow up.

## 2019-10-01 ENCOUNTER — ROUTINE PRENATAL (OUTPATIENT)
Dept: OBSTETRICS AND GYNECOLOGY | Facility: CLINIC | Age: 44
End: 2019-10-01
Payer: COMMERCIAL

## 2019-10-01 VITALS
SYSTOLIC BLOOD PRESSURE: 126 MMHG | WEIGHT: 154.31 LBS | BODY MASS INDEX: 27.34 KG/M2 | DIASTOLIC BLOOD PRESSURE: 74 MMHG

## 2019-10-01 DIAGNOSIS — O09.91 HIGH-RISK PREGNANCY IN FIRST TRIMESTER: Primary | ICD-10-CM

## 2019-10-01 PROCEDURE — 99999 PR PBB SHADOW E&M-EST. PATIENT-LVL II: ICD-10-PCS | Mod: PBBFAC,,, | Performed by: OBSTETRICS & GYNECOLOGY

## 2019-10-01 PROCEDURE — 0502F SUBSEQUENT PRENATAL CARE: CPT | Mod: CPTII,S$GLB,, | Performed by: OBSTETRICS & GYNECOLOGY

## 2019-10-01 PROCEDURE — 99999 PR PBB SHADOW E&M-EST. PATIENT-LVL II: CPT | Mod: PBBFAC,,, | Performed by: OBSTETRICS & GYNECOLOGY

## 2019-10-01 PROCEDURE — 0502F PR SUBSEQUENT PRENATAL CARE: ICD-10-PCS | Mod: CPTII,S$GLB,, | Performed by: OBSTETRICS & GYNECOLOGY

## 2019-10-01 NOTE — PROGRESS NOTES
Pt doing well.  Denies any issues at this time.  [id number] negative.  Despite negative screening, pt asking about doing Amnio/CVS.  Discussed that given her normal US and normal [id number] would not recommend invasive testing with amnio or CVS.  Long discussion had about patient's anxiety and concerns this pregnancy.  Discussed ways to decrease stress during this pregnancy. Denies regular CTX, VB, VD, LOF.  + FM.  Continue PNV.  Labor precautions reviewed.  Pt to RTC in 2-4 week.

## 2019-10-15 ENCOUNTER — ROUTINE PRENATAL (OUTPATIENT)
Dept: OBSTETRICS AND GYNECOLOGY | Facility: CLINIC | Age: 44
End: 2019-10-15
Payer: COMMERCIAL

## 2019-10-15 ENCOUNTER — LAB VISIT (OUTPATIENT)
Dept: LAB | Facility: OTHER | Age: 44
End: 2019-10-15
Attending: OBSTETRICS & GYNECOLOGY
Payer: COMMERCIAL

## 2019-10-15 VITALS
SYSTOLIC BLOOD PRESSURE: 108 MMHG | BODY MASS INDEX: 27.42 KG/M2 | WEIGHT: 154.75 LBS | DIASTOLIC BLOOD PRESSURE: 70 MMHG

## 2019-10-15 DIAGNOSIS — O09.91 HIGH-RISK PREGNANCY IN FIRST TRIMESTER: ICD-10-CM

## 2019-10-15 DIAGNOSIS — O09.91 HIGH-RISK PREGNANCY IN FIRST TRIMESTER: Primary | ICD-10-CM

## 2019-10-15 PROCEDURE — 0502F PR SUBSEQUENT PRENATAL CARE: ICD-10-PCS | Mod: CPTII,S$GLB,, | Performed by: OBSTETRICS & GYNECOLOGY

## 2019-10-15 PROCEDURE — 82105 ALPHA-FETOPROTEIN SERUM: CPT

## 2019-10-15 PROCEDURE — 0502F SUBSEQUENT PRENATAL CARE: CPT | Mod: CPTII,S$GLB,, | Performed by: OBSTETRICS & GYNECOLOGY

## 2019-10-15 PROCEDURE — 99999 PR PBB SHADOW E&M-EST. PATIENT-LVL III: ICD-10-PCS | Mod: PBBFAC,,, | Performed by: OBSTETRICS & GYNECOLOGY

## 2019-10-15 PROCEDURE — 36415 COLL VENOUS BLD VENIPUNCTURE: CPT

## 2019-10-15 PROCEDURE — 99999 PR PBB SHADOW E&M-EST. PATIENT-LVL III: CPT | Mod: PBBFAC,,, | Performed by: OBSTETRICS & GYNECOLOGY

## 2019-10-15 NOTE — PROGRESS NOTES
Pt with continued anxiety about this pregnancy.  Pt is stressed about mosquitos borne diseases in [place] and would like to know how to prevent these.  Discussed ways to prevent mosquito bites.  Also discussed rarity of these diseases and worked to relieve anxiety.  Pt notes that she had one day of diarrhea after eating gumbo with potato salad.  Has since resolved.  Discussed gastroenteritis as well as discussed that in pregnancies food tolerances can change.  FHT verified which helped relieve some anxiety for patient.  Anatomy US is set.  Pt would like to be seen before then for visit.  Pt scheduled. Denies regular CTX, VB, VD, LOF. Continue PNV.  Needs AFP.  AFP ordered. Pt to RTC in 2-4 week.

## 2019-10-17 ENCOUNTER — PATIENT MESSAGE (OUTPATIENT)
Dept: DERMATOLOGY | Facility: CLINIC | Age: 44
End: 2019-10-17

## 2019-10-17 RX ORDER — VALACYCLOVIR HYDROCHLORIDE 1 G/1
TABLET, FILM COATED ORAL
Qty: 30 TABLET | Refills: 0 | Status: SHIPPED | OUTPATIENT
Start: 2019-10-17 | End: 2020-06-29 | Stop reason: SDUPTHER

## 2019-10-18 LAB
# FETUSES US: NORMAL
AFP INTERPRETATION: NORMAL
AFP MOM SERPL: 1.61
AFP SERPL-MCNC: 48.2 NG/ML
AFP SERPL-MCNC: NEGATIVE NG/ML
AGE AT DELIVERY: 44
GA (DAYS): 1 D
GA (WEEKS): 15 WK
GA METHOD: NORMAL
IDDM PATIENT QL: NORMAL
SMOKING STATUS FTND: NO

## 2019-10-18 RX ORDER — CICLOPIROX 1 G/100ML
SHAMPOO TOPICAL
Qty: 120 ML | Refills: 3 | Status: SHIPPED | OUTPATIENT
Start: 2019-10-18 | End: 2020-06-23

## 2019-10-28 PROBLEM — O09.299 TRISOMY 18 IN CHILD OF PRIOR PREGNANCY, CURRENTLY PREGNANT: Status: RESOLVED | Noted: 2018-11-15 | Resolved: 2019-10-28

## 2019-10-29 ENCOUNTER — ROUTINE PRENATAL (OUTPATIENT)
Dept: OBSTETRICS AND GYNECOLOGY | Facility: CLINIC | Age: 44
End: 2019-10-29
Payer: COMMERCIAL

## 2019-10-29 VITALS
SYSTOLIC BLOOD PRESSURE: 110 MMHG | DIASTOLIC BLOOD PRESSURE: 58 MMHG | BODY MASS INDEX: 28.16 KG/M2 | WEIGHT: 158.94 LBS

## 2019-10-29 DIAGNOSIS — O09.91 HIGH-RISK PREGNANCY IN FIRST TRIMESTER: Primary | ICD-10-CM

## 2019-10-29 PROCEDURE — 0502F SUBSEQUENT PRENATAL CARE: CPT | Mod: CPTII,S$GLB,, | Performed by: OBSTETRICS & GYNECOLOGY

## 2019-10-29 PROCEDURE — 0502F PR SUBSEQUENT PRENATAL CARE: ICD-10-PCS | Mod: CPTII,S$GLB,, | Performed by: OBSTETRICS & GYNECOLOGY

## 2019-10-29 PROCEDURE — 99999 PR PBB SHADOW E&M-EST. PATIENT-LVL III: CPT | Mod: PBBFAC,,, | Performed by: OBSTETRICS & GYNECOLOGY

## 2019-10-29 PROCEDURE — 99999 PR PBB SHADOW E&M-EST. PATIENT-LVL III: ICD-10-PCS | Mod: PBBFAC,,, | Performed by: OBSTETRICS & GYNECOLOGY

## 2019-10-29 NOTE — PROGRESS NOTES
Pt doing well.  Denies any issues at this time.  Pt reports that she hasnt been feeling the baby move as much as usual.  Explained that we do not recommend kick counts at her gestational age.  Feels anxiety is improving lately.  Has anatomy US scheduled for 11/8. Denies regular CTX, VB, VD, LOF. Continue PNV.  Labor precautions reviewed.  Pt to RTC in 4 week.

## 2019-11-06 ENCOUNTER — TELEPHONE (OUTPATIENT)
Dept: OBSTETRICS AND GYNECOLOGY | Facility: CLINIC | Age: 44
End: 2019-11-06

## 2019-11-06 ENCOUNTER — PATIENT MESSAGE (OUTPATIENT)
Dept: OBSTETRICS AND GYNECOLOGY | Facility: CLINIC | Age: 44
End: 2019-11-06

## 2019-11-06 NOTE — TELEPHONE ENCOUNTER
----- Message from Rylee Leyva sent at 11/6/2019  9:21 AM CST -----  Contact: RINKU DIAL [[id number]]  Name of Who is Calling : RINKU DIAL [[id number]]    What is the request in detail :     Patient is requesting a call from staff in regards to her symptoms she's experiencing and if she needs to be seen   .....Please contact to further discuss and advise.    Can the clinic reply by [facility] :  YES    What Number to Call Back : [phone]

## 2019-11-06 NOTE — TELEPHONE ENCOUNTER
Messages from pt portal have been sent to Dr. Burch in regards to if pt needs to be seen. Dr. Burch has called and spoken with pt.

## 2019-11-06 NOTE — TELEPHONE ENCOUNTER
Called patient to discuss symptoms.  No fever/chills.  Had 1 episode of vomiting today. Has had improvement in symptoms since vomiting.  Pt is tolerating food and liquids.  Recommend that patient rest today and have Bates diet.  If symptoms persist >24 hours or if unable to tolerate any liquids or food, patient to let us know.  Improving at this time.     Siobhan Burch MD  Obstetrics & Gynecology

## 2019-11-08 ENCOUNTER — PROCEDURE VISIT (OUTPATIENT)
Dept: MATERNAL FETAL MEDICINE | Facility: CLINIC | Age: 44
End: 2019-11-08
Payer: COMMERCIAL

## 2019-11-08 DIAGNOSIS — O09.291 PREVIOUS PREGNANCY COMPLICATED BY CHROMOSOMAL ABNORMALITY IN FIRST TRIMESTER, ANTEPARTUM: ICD-10-CM

## 2019-11-08 DIAGNOSIS — Z36.89 ULTRASOUND SCAN TO EVALUATE PLACENTA LOCATION: ICD-10-CM

## 2019-11-08 DIAGNOSIS — Z36.89 ENCOUNTER FOR FETAL ANATOMIC SURVEY: ICD-10-CM

## 2019-11-08 DIAGNOSIS — Z36.89 ENCOUNTER FOR ULTRASOUND TO CHECK FETAL GROWTH: Primary | ICD-10-CM

## 2019-11-08 PROCEDURE — 76811 OB US DETAILED SNGL FETUS: CPT | Mod: S$GLB,,, | Performed by: PEDIATRICS

## 2019-11-08 PROCEDURE — 99499 NO LOS: ICD-10-PCS | Mod: S$GLB,,, | Performed by: PEDIATRICS

## 2019-11-08 PROCEDURE — 99499 UNLISTED E&M SERVICE: CPT | Mod: S$GLB,,, | Performed by: PEDIATRICS

## 2019-11-08 PROCEDURE — 76811 PR US, OB FETAL EVAL & EXAM, TRANSABDOM,FIRST GESTATION: ICD-10-PCS | Mod: S$GLB,,, | Performed by: PEDIATRICS

## 2019-11-13 ENCOUNTER — OFFICE VISIT (OUTPATIENT)
Dept: DERMATOLOGY | Facility: CLINIC | Age: 44
End: 2019-11-13
Payer: COMMERCIAL

## 2019-11-13 DIAGNOSIS — L21.9 SEBORRHEIC DERMATITIS: Primary | ICD-10-CM

## 2019-11-13 DIAGNOSIS — L74.0 HEAT RASH: ICD-10-CM

## 2019-11-13 DIAGNOSIS — L82.0 BENIGN LICHENOID KERATOSIS: ICD-10-CM

## 2019-11-13 PROCEDURE — 99213 OFFICE O/P EST LOW 20 MIN: CPT | Mod: S$GLB,,, | Performed by: DERMATOLOGY

## 2019-11-13 PROCEDURE — 99999 PR PBB SHADOW E&M-EST. PATIENT-LVL II: ICD-10-PCS | Mod: PBBFAC,,, | Performed by: DERMATOLOGY

## 2019-11-13 PROCEDURE — 99213 PR OFFICE/OUTPT VISIT, EST, LEVL III, 20-29 MIN: ICD-10-PCS | Mod: S$GLB,,, | Performed by: DERMATOLOGY

## 2019-11-13 PROCEDURE — 99999 PR PBB SHADOW E&M-EST. PATIENT-LVL II: CPT | Mod: PBBFAC,,, | Performed by: DERMATOLOGY

## 2019-11-13 RX ORDER — FLUOCINOLONE ACETONIDE 0.11 MG/ML
OIL TOPICAL
Qty: 1 BOTTLE | Refills: 3 | Status: SHIPPED | OUTPATIENT
Start: 2019-11-13

## 2019-11-13 RX ORDER — BETAMETHASONE DIPROPIONATE 0.5 MG/G
GEL TOPICAL 2 TIMES DAILY
Qty: 50 G | Refills: 2 | Status: SHIPPED | OUTPATIENT
Start: 2019-11-13

## 2019-11-13 RX ORDER — HYDROCORTISONE 25 MG/G
CREAM TOPICAL 2 TIMES DAILY
Qty: 28 G | Refills: 2 | Status: SHIPPED | OUTPATIENT
Start: 2019-11-13 | End: 2021-09-17 | Stop reason: SDUPTHER

## 2019-11-13 NOTE — PROGRESS NOTES
Subjective:       Patient ID:  Marisela Siddiqui is a 44 y.o. female who presents for   Chief Complaint   Patient presents with    Rash     between breast    Hair/Scalp Problem     f/u     Rash  - Initial  Affected locations: between breast.  Signs and Symptoms: aggravating.  Relieving factors/Treatments tried: Rx topical steroids    Hair/Scalp Problem  - Follow-up  Symptom course: worsening  Affected locations: scalp  Signs / symptoms: itching and scaling    here for seb derm f/u and rash between breasts (heat rash/irritant).  Pt is 19 weeks pregnant at this time. Using her RX shampoos - ciclopirox helped initially but is not working anymore. She was unable to get dermasmooth oil. She does not think that lidex solution helps.  Left side of scalp is flaring today.    Review of Systems   Skin: Positive for itching and rash.   Hematologic/Lymphatic: Does not bruise/bleed easily.        Objective:    Physical Exam   Constitutional: She appears well-developed and well-nourished. No distress.   Neurological: She is alert and oriented to person, place, and time. She is not disoriented.   Psychiatric: She has a normal mood and affect.   Skin:   Areas Examined (abnormalities noted in diagram):   Scalp / Hair Palpated and Inspected  Head / Face Inspection Performed  Neck Inspection Performed  Chest / Axilla Inspection Performed  Back Inspection Performed  RUE Inspected  LUE Inspection Performed                   Diagram Legend     Erythematous scaling macule/papule c/w actinic keratosis       Vascular papule c/w angioma      Pigmented verrucoid papule/plaque c/w seborrheic keratosis      Yellow umbilicated papule c/w sebaceous hyperplasia      Irregularly shaped tan macule c/w lentigo     1-2 mm smooth white papules consistent with Milia      Movable subcutaneous cyst with punctum c/w epidermal inclusion cyst      Subcutaneous movable cyst c/w pilar cyst      Firm pink to brown papule c/w dermatofibroma      Pedunculated  fleshy papule(s) c/w skin tag(s)      Evenly pigmented macule c/w junctional nevus     Mildly variegated pigmented, slightly irregular-bordered macule c/w mildly atypical nevus      Flesh colored to evenly pigmented papule c/w intradermal nevus       Pink pearly papule/plaque c/w basal cell carcinoma      Erythematous hyperkeratotic cursted plaque c/w SCC      Surgical scar with no sign of skin cancer recurrence      Open and closed comedones      Inflammatory papules and pustules      Verrucoid papule consistent consistent with wart     Erythematous eczematous patches and plaques     Dystrophic onycholytic nail with subungual debris c/w onychomycosis     Umbilicated papule    Erythematous-base heme-crusted tan verrucoid plaque consistent with inflamed seborrheic keratosis     Erythematous Silvery Scaling Plaque c/w Psoriasis     See annotation      Assessment / Plan:        Seborrheic dermatitis -   -     DERMA-SMOOTHE/FS SCALP OIL 0.01 % Oil; Apply oil to damp scalp nightly and cover with shower cap.  Dispense: 1 Bottle; Refill: 3  -     augmented betamethasone (DIPROLENE) 0.05 % gel; Apply topically 2 (two) times daily. To scalp PRN severe irritation BID to AA  Dispense: 50 g; Refill: 2    Discussed that most of the antidandruff shampoos have not been studied for safety during pregnancy. Using selsun blue is thought to be safe.    Heat rash  -     hydrocortisone 2.5 % cream; Apply topically 2 (two) times daily. To rash on chest and behind ears PRN  Dispense: 28 g; Refill: 2    Benign lichenoid keratosis  Reassurance given to patient. No treatment is necessary.   Treatment of benign, asymptomatic lesions may be considered cosmetic.                 Follow up if symptoms worsen or fail to improve.

## 2019-11-14 ENCOUNTER — TELEPHONE (OUTPATIENT)
Dept: DERMATOLOGY | Facility: CLINIC | Age: 44
End: 2019-11-14

## 2019-11-14 NOTE — TELEPHONE ENCOUNTER
Spoke with Tracey at [employer], states that the [place] has to be written for the generic. Inform her provider out today and will need to send message and will get back to her tomorrow.      ----- Message from Milana Gutierrez sent at 11/14/2019 10:30 AM CST -----  Contact: [facility] [facility]  Pt Rx will come up as generic version for DERMA-SMOOTHE/FS SCALP OIL 0.01 % Oil. Please give Tracey a call back at [phone] for any questions .

## 2019-11-18 RX ORDER — FLUOCINOLONE ACETONIDE 0.11 MG/ML
OIL TOPICAL 3 TIMES DAILY
Qty: 118.28 ML | Refills: 0 | Status: SHIPPED | OUTPATIENT
Start: 2019-11-18 | End: 2021-09-14 | Stop reason: SDUPTHER

## 2019-11-22 ENCOUNTER — ROUTINE PRENATAL (OUTPATIENT)
Dept: OBSTETRICS AND GYNECOLOGY | Facility: CLINIC | Age: 44
End: 2019-11-22
Payer: COMMERCIAL

## 2019-11-22 VITALS
DIASTOLIC BLOOD PRESSURE: 70 MMHG | BODY MASS INDEX: 28.82 KG/M2 | WEIGHT: 162.69 LBS | SYSTOLIC BLOOD PRESSURE: 106 MMHG

## 2019-11-22 DIAGNOSIS — O09.91 HIGH-RISK PREGNANCY IN FIRST TRIMESTER: Primary | ICD-10-CM

## 2019-11-22 PROCEDURE — 99999 PR PBB SHADOW E&M-EST. PATIENT-LVL III: ICD-10-PCS | Mod: PBBFAC,,, | Performed by: OBSTETRICS & GYNECOLOGY

## 2019-11-22 PROCEDURE — 0502F PR SUBSEQUENT PRENATAL CARE: ICD-10-PCS | Mod: CPTII,S$GLB,, | Performed by: OBSTETRICS & GYNECOLOGY

## 2019-11-22 PROCEDURE — 99999 PR PBB SHADOW E&M-EST. PATIENT-LVL III: CPT | Mod: PBBFAC,,, | Performed by: OBSTETRICS & GYNECOLOGY

## 2019-11-22 PROCEDURE — 0502F SUBSEQUENT PRENATAL CARE: CPT | Mod: CPTII,S$GLB,, | Performed by: OBSTETRICS & GYNECOLOGY

## 2019-11-22 NOTE — PROGRESS NOTES
Pt doing well.  Denies any issues at this time.  No further GI issues since her call.  Denies regular CTX, VB, VD, LOF.  + FM.  Continue PNV.  Labor precautions reviewed.  Has US scheduled to finish anatomy US.  Will do glucose screen and CBC at next visit. Pt to RTC in 4 week.

## 2019-12-09 ENCOUNTER — PROCEDURE VISIT (OUTPATIENT)
Dept: MATERNAL FETAL MEDICINE | Facility: CLINIC | Age: 44
End: 2019-12-09
Payer: COMMERCIAL

## 2019-12-09 DIAGNOSIS — Z36.89 ENCOUNTER FOR ULTRASOUND TO CHECK FETAL GROWTH: ICD-10-CM

## 2019-12-09 DIAGNOSIS — Z36.89 ULTRASOUND SCAN TO EVALUATE PLACENTA LOCATION: ICD-10-CM

## 2019-12-09 PROCEDURE — 76816 PR  US,PREGNANT UTERUS,F/U,TRANSABD APP: ICD-10-PCS | Mod: S$GLB,,, | Performed by: PEDIATRICS

## 2019-12-09 PROCEDURE — 76816 OB US FOLLOW-UP PER FETUS: CPT | Mod: S$GLB,,, | Performed by: PEDIATRICS

## 2019-12-17 ENCOUNTER — ROUTINE PRENATAL (OUTPATIENT)
Dept: OBSTETRICS AND GYNECOLOGY | Facility: CLINIC | Age: 44
End: 2019-12-17
Payer: COMMERCIAL

## 2019-12-17 ENCOUNTER — TELEPHONE (OUTPATIENT)
Dept: OBSTETRICS AND GYNECOLOGY | Facility: CLINIC | Age: 44
End: 2019-12-17

## 2019-12-17 ENCOUNTER — LAB VISIT (OUTPATIENT)
Dept: LAB | Facility: OTHER | Age: 44
End: 2019-12-17
Attending: OBSTETRICS & GYNECOLOGY
Payer: COMMERCIAL

## 2019-12-17 VITALS
SYSTOLIC BLOOD PRESSURE: 112 MMHG | BODY MASS INDEX: 30.19 KG/M2 | DIASTOLIC BLOOD PRESSURE: 74 MMHG | WEIGHT: 170.44 LBS

## 2019-12-17 DIAGNOSIS — O09.92 HIGH-RISK PREGNANCY IN SECOND TRIMESTER: Primary | ICD-10-CM

## 2019-12-17 DIAGNOSIS — M62.830 MUSCLE SPASM OF BACK: ICD-10-CM

## 2019-12-17 DIAGNOSIS — O09.91 HIGH-RISK PREGNANCY IN FIRST TRIMESTER: ICD-10-CM

## 2019-12-17 LAB
BASOPHILS # BLD AUTO: 0.05 K/UL (ref 0–0.2)
BASOPHILS NFR BLD: 0.5 % (ref 0–1.9)
DIFFERENTIAL METHOD: ABNORMAL
EOSINOPHIL # BLD AUTO: 0.3 K/UL (ref 0–0.5)
EOSINOPHIL NFR BLD: 2.4 % (ref 0–8)
ERYTHROCYTE [DISTWIDTH] IN BLOOD BY AUTOMATED COUNT: 13.1 % (ref 11.5–14.5)
GLUCOSE SERPL-MCNC: 98 MG/DL (ref 70–140)
HCT VFR BLD AUTO: 40.6 % (ref 37–48.5)
HGB BLD-MCNC: 13.3 G/DL (ref 12–16)
IMM GRANULOCYTES # BLD AUTO: 0.31 K/UL (ref 0–0.04)
IMM GRANULOCYTES NFR BLD AUTO: 2.9 % (ref 0–0.5)
LYMPHOCYTES # BLD AUTO: 1.7 K/UL (ref 1–4.8)
LYMPHOCYTES NFR BLD: 16.6 % (ref 18–48)
MCH RBC QN AUTO: 28.7 PG (ref 27–31)
MCHC RBC AUTO-ENTMCNC: 32.8 G/DL (ref 32–36)
MCV RBC AUTO: 88 FL (ref 82–98)
MONOCYTES # BLD AUTO: 0.7 K/UL (ref 0.3–1)
MONOCYTES NFR BLD: 6.3 % (ref 4–15)
NEUTROPHILS # BLD AUTO: 7.5 K/UL (ref 1.8–7.7)
NEUTROPHILS NFR BLD: 71.3 % (ref 38–73)
NRBC BLD-RTO: 0 /100 WBC
PLATELET # BLD AUTO: 221 K/UL (ref 150–350)
PMV BLD AUTO: 9.6 FL (ref 9.2–12.9)
RBC # BLD AUTO: 4.64 M/UL (ref 4–5.4)
WBC # BLD AUTO: 10.51 K/UL (ref 3.9–12.7)

## 2019-12-17 PROCEDURE — 0502F PR SUBSEQUENT PRENATAL CARE: ICD-10-PCS | Mod: CPTII,S$GLB,, | Performed by: OBSTETRICS & GYNECOLOGY

## 2019-12-17 PROCEDURE — 99999 PR PBB SHADOW E&M-EST. PATIENT-LVL III: ICD-10-PCS | Mod: PBBFAC,,, | Performed by: OBSTETRICS & GYNECOLOGY

## 2019-12-17 PROCEDURE — 82950 GLUCOSE TEST: CPT

## 2019-12-17 PROCEDURE — 36415 COLL VENOUS BLD VENIPUNCTURE: CPT

## 2019-12-17 PROCEDURE — 0502F SUBSEQUENT PRENATAL CARE: CPT | Mod: CPTII,S$GLB,, | Performed by: OBSTETRICS & GYNECOLOGY

## 2019-12-17 PROCEDURE — 85025 COMPLETE CBC W/AUTO DIFF WBC: CPT

## 2019-12-17 PROCEDURE — 99999 PR PBB SHADOW E&M-EST. PATIENT-LVL III: CPT | Mod: PBBFAC,,, | Performed by: OBSTETRICS & GYNECOLOGY

## 2019-12-17 RX ORDER — CYCLOBENZAPRINE HCL 10 MG
10 TABLET ORAL 3 TIMES DAILY PRN
Qty: 15 TABLET | Refills: 0 | Status: SHIPPED | OUTPATIENT
Start: 2019-12-17 | End: 2019-12-27

## 2019-12-17 NOTE — PROGRESS NOTES
Pt doing well.  Having muscle spasms in upper back.  Will give short course of flexeril. Rx sent.  Discussed findings on US.  Pt with possible posterior previa and/or vasa previa.  Will get US at 30 weeks.  If previa present, pt aware needs C/S. Denies regular CTX, VB, VD, LOF.  + FM.  Continue PNV.  Labor precautions reviewed.  Pt to RTC in 4 week. Glucose screen done today

## 2019-12-28 ENCOUNTER — PATIENT MESSAGE (OUTPATIENT)
Dept: OBSTETRICS AND GYNECOLOGY | Facility: CLINIC | Age: 44
End: 2019-12-28

## 2020-01-03 ENCOUNTER — PATIENT MESSAGE (OUTPATIENT)
Dept: OBSTETRICS AND GYNECOLOGY | Facility: CLINIC | Age: 45
End: 2020-01-03

## 2020-01-03 NOTE — TELEPHONE ENCOUNTER
Patient Reschedule appt on 1/14/2020 to 1/10/2020 @[address] [place] [address] and appt on 1/28/2020 to 2/4/2020 at [facility]. She also wanted to reschedule her MFM appt I gave her the number the to contact [facility].

## 2020-01-10 ENCOUNTER — ROUTINE PRENATAL (OUTPATIENT)
Dept: OBSTETRICS AND GYNECOLOGY | Facility: CLINIC | Age: 45
End: 2020-01-10
Payer: COMMERCIAL

## 2020-01-10 VITALS — DIASTOLIC BLOOD PRESSURE: 64 MMHG | SYSTOLIC BLOOD PRESSURE: 102 MMHG | BODY MASS INDEX: 31.4 KG/M2 | WEIGHT: 177.25 LBS

## 2020-01-10 DIAGNOSIS — O09.92 HIGH-RISK PREGNANCY IN SECOND TRIMESTER: Primary | ICD-10-CM

## 2020-01-10 PROCEDURE — 0502F SUBSEQUENT PRENATAL CARE: CPT | Mod: S$GLB,,, | Performed by: OBSTETRICS & GYNECOLOGY

## 2020-01-10 PROCEDURE — 99999 PR PBB SHADOW E&M-EST. PATIENT-LVL III: CPT | Mod: PBBFAC,,, | Performed by: OBSTETRICS & GYNECOLOGY

## 2020-01-10 PROCEDURE — 99999 PR PBB SHADOW E&M-EST. PATIENT-LVL III: ICD-10-PCS | Mod: PBBFAC,,, | Performed by: OBSTETRICS & GYNECOLOGY

## 2020-01-10 PROCEDURE — 0502F PR SUBSEQUENT PRENATAL CARE: ICD-10-PCS | Mod: S$GLB,,, | Performed by: OBSTETRICS & GYNECOLOGY

## 2020-01-14 ENCOUNTER — PATIENT MESSAGE (OUTPATIENT)
Dept: OBSTETRICS AND GYNECOLOGY | Facility: CLINIC | Age: 45
End: 2020-01-14

## 2020-01-24 NOTE — PROGRESS NOTES
Pt is a   who presents to establish OB care.  This pregnancy is an IVF pregnancy with transfer of Day 3 embryo done in [place].  Pt is taking vaginal progesterone daily to be taken up to 12 wga.  Pt was seen in the ED on Saturday and was noted to have a castanon IUP.  Pt was seen for vaginal spotting US showed subchorionic hematoma.  Pt reports that bleeding is minimal now.  Here to establish care.  Pt having nausea.  No vomiting.  Would like to try diclegis.  Will send Rx. Take a PNV daily    OBHx:  1) SAB at 12 weeks- Trisomy 18 - D&C x2- 2nd for retained POC  2) SAB @ 6 weeks- D&C x2- 2nd for retained POC  3) SAB @ 5 weeks- D&C x1  RPL workup negative--- results in media tab    On exam, uterus approx 8 week size  No vaginal bleeding noted.  Vaginal progesterone noted in the vaginal vault    Plan:  1. Amenorrhea  -- + UPT in office, Embryo transfer of day 3 embryo.  Date of Delivery: 19 by TVUS done in ED.  Will get dating US to confirm  -- Dating US scheduled  -- Anatomical US 19-20 weeks  -- Routine serum and urine prenatal labs today     2. Physical exam today  -- Pap done today.     3. BMI 26  -- Discussed IOM recommended weight gain of:          Underweight        Less than 18.5            28-40            Normal Weight     18.5-24.9                    25-35            Overweight          25-29.9                       15-25            Obese                  30 and greater             11-20    -- Discussed criteria for delivery at [facility] r/t excessive pre-preg weight or excessive weight gain:          Pre-pregnancy BMI over 40 or excess pregnancy weight gain defined as:          Pre-preg BMI < 18.5; Excess weight gain = > 60 pound          Pre-preg BMI 18.5-24.9;  Excess weight gain = > 53 pounds          Pre-preg BMI 25-29.9;  Excess weight gain = > 38 pounds          Pre-preg BMI > 30;  Excess weight gain = > 30 pounds     4. Discussed nausea and vomiting in pregnancy  -- Education regarding  Pt. Here for NOB visit today.  # [phone]  First prenatal care  Pt. States having nausea and vomiting. Also has nerve pain and back pain complications prior to pregnancy.   Pharmacy verified  Chaperone offered and declined         lifestyle and dietary modifications  -- Reviewed use of B6/Unisom prn.  Diclegis Rx sent. Pt will notify us if no relief/worsening symptoms, will consider alternative therapies prn     5. Pregnancy education and couseling; handouts and booklet provided  -- Oriented to practice and anticipated prenatal course of care and how to contact us  -- Precautions/warning signs reviewed  -- Common complaints of pregnancy  -- Routine prenatal labs including HIV  -- Ultrasounds  -- Nutrition, prepregnant BMI, and recommended weight gain  -- Toxoplasmosis precautions (Cats/Raw Meat)  -- Sexual activity and exercise  -- Environmental/Work hazards  -- Travel  -- Tobacco (Ask, Advise, Assess, Assist, and Arrange), as well as alcohol and drug use  -- Use of any medications (Including supplements, Vitamins, Herbs, or OTC Drugs)  -- Domestic violence screen neg.      6. Reviewed genetic testing options. Reviewed available first trimester and/or second  trimester screening options. Reviewed risk of false positive/negative results and recommendation of referral to MFM in event of a positive result, for NIPT, US, and/or amniocentesis. Reviewed the possible estimated 1 in 300/500 risk of miscarriage with amniocentesis, even with a healthy fetus. Patient desires genetic screening.  - Given AMA and hx of Trisomy 18 pregnancy-- would recommend [id number].  Pt given number to call and will order if would like it done    7. IVF pregnancy  - pt with RPL (negative workup) and IVF pregnnacy  - Continue nightly vaginal progesterone to 12wga  - Will need Fetal echo and PNT/growth at 32 weeks  - Will get MFM consult      RTC in 4 weeks

## 2020-01-27 ENCOUNTER — PATIENT MESSAGE (OUTPATIENT)
Dept: OBSTETRICS AND GYNECOLOGY | Facility: CLINIC | Age: 45
End: 2020-01-27

## 2020-02-05 ENCOUNTER — HOSPITAL ENCOUNTER (OUTPATIENT)
Dept: RADIOLOGY | Facility: OTHER | Age: 45
Discharge: HOME | End: 2020-02-05
Attending: OBSTETRICS & GYNECOLOGY
Payer: COMMERCIAL

## 2020-02-05 ENCOUNTER — INITIAL CONSULT (OUTPATIENT)
Dept: MATERNAL FETAL MEDICINE | Facility: CLINIC | Age: 45
End: 2020-02-05
Payer: COMMERCIAL

## 2020-02-05 ENCOUNTER — PROCEDURE VISIT (OUTPATIENT)
Dept: MATERNAL FETAL MEDICINE | Facility: CLINIC | Age: 45
End: 2020-02-05
Payer: COMMERCIAL

## 2020-02-05 ENCOUNTER — ROUTINE PRENATAL (OUTPATIENT)
Dept: OBSTETRICS AND GYNECOLOGY | Facility: CLINIC | Age: 45
End: 2020-02-05
Payer: COMMERCIAL

## 2020-02-05 VITALS
SYSTOLIC BLOOD PRESSURE: 124 MMHG | WEIGHT: 184.31 LBS | BODY MASS INDEX: 32.65 KG/M2 | DIASTOLIC BLOOD PRESSURE: 80 MMHG | DIASTOLIC BLOOD PRESSURE: 86 MMHG | SYSTOLIC BLOOD PRESSURE: 126 MMHG | WEIGHT: 184.5 LBS | BODY MASS INDEX: 32.69 KG/M2

## 2020-02-05 DIAGNOSIS — Z36.89 ENCOUNTER FOR ULTRASOUND TO CHECK FETAL GROWTH: ICD-10-CM

## 2020-02-05 DIAGNOSIS — N89.8 VAGINAL DISCHARGE DURING PREGNANCY IN THIRD TRIMESTER: ICD-10-CM

## 2020-02-05 DIAGNOSIS — M79.89 LEFT LEG SWELLING: ICD-10-CM

## 2020-02-05 DIAGNOSIS — O26.893 VAGINAL DISCHARGE DURING PREGNANCY IN THIRD TRIMESTER: ICD-10-CM

## 2020-02-05 DIAGNOSIS — Z36.89 ENCOUNTER FOR ULTRASOUND TO ASSESS FETAL GROWTH: Primary | ICD-10-CM

## 2020-02-05 DIAGNOSIS — R60.0 LOWER EXTREMITY EDEMA: ICD-10-CM

## 2020-02-05 DIAGNOSIS — O09.523 MULTIGRAVIDA OF ADVANCED MATERNAL AGE IN THIRD TRIMESTER: Primary | ICD-10-CM

## 2020-02-05 DIAGNOSIS — B37.9 CANDIDIASIS: ICD-10-CM

## 2020-02-05 PROCEDURE — 87481 CANDIDA DNA AMP PROBE: CPT | Mod: 59

## 2020-02-05 PROCEDURE — 99214 OFFICE O/P EST MOD 30 MIN: CPT | Mod: 25,S$GLB,, | Performed by: OBSTETRICS & GYNECOLOGY

## 2020-02-05 PROCEDURE — 99999 PR PBB SHADOW E&M-EST. PATIENT-LVL III: ICD-10-PCS | Mod: PBBFAC,,, | Performed by: OBSTETRICS & GYNECOLOGY

## 2020-02-05 PROCEDURE — 99214 PR OFFICE/OUTPT VISIT, EST, LEVL IV, 30-39 MIN: ICD-10-PCS | Mod: 25,S$GLB,, | Performed by: OBSTETRICS & GYNECOLOGY

## 2020-02-05 PROCEDURE — 76816 PR  US,PREGNANT UTERUS,F/U,TRANSABD APP: ICD-10-PCS | Mod: S$GLB,,, | Performed by: OBSTETRICS & GYNECOLOGY

## 2020-02-05 PROCEDURE — 93971 EXTREMITY STUDY: CPT | Mod: 26,LT,, | Performed by: RADIOLOGY

## 2020-02-05 PROCEDURE — 0502F SUBSEQUENT PRENATAL CARE: CPT | Mod: S$GLB,,, | Performed by: OBSTETRICS & GYNECOLOGY

## 2020-02-05 PROCEDURE — 93971 EXTREMITY STUDY: CPT | Mod: TC,LT

## 2020-02-05 PROCEDURE — 76819 FETAL BIOPHYS PROFIL W/O NST: CPT | Mod: S$GLB,,, | Performed by: OBSTETRICS & GYNECOLOGY

## 2020-02-05 PROCEDURE — 76819 PR US, OB, FETAL BIOPHYSICAL, W/O NST: ICD-10-PCS | Mod: S$GLB,,, | Performed by: OBSTETRICS & GYNECOLOGY

## 2020-02-05 PROCEDURE — 93971 US LOWER EXTREMITY VEINS LEFT: ICD-10-PCS | Mod: 26,LT,, | Performed by: RADIOLOGY

## 2020-02-05 PROCEDURE — 76816 OB US FOLLOW-UP PER FETUS: CPT | Mod: S$GLB,,, | Performed by: OBSTETRICS & GYNECOLOGY

## 2020-02-05 PROCEDURE — 99999 PR PBB SHADOW E&M-EST. PATIENT-LVL III: CPT | Mod: PBBFAC,,, | Performed by: OBSTETRICS & GYNECOLOGY

## 2020-02-05 PROCEDURE — 0502F PR SUBSEQUENT PRENATAL CARE: ICD-10-PCS | Mod: S$GLB,,, | Performed by: OBSTETRICS & GYNECOLOGY

## 2020-02-05 RX ORDER — FLUCONAZOLE 150 MG/1
150 TABLET ORAL DAILY
Qty: 3 TABLET | Refills: 0 | Status: SHIPPED | OUTPATIENT
Start: 2020-02-05 | End: 2020-02-08

## 2020-02-05 NOTE — PROGRESS NOTES
Indication  ========    RT MD: Growth/Placenta location. Evaluation of fetal growth    History  ======    General History  Blood group: 0  Rhesus: Rh positive  Previous Outcomes   5  Preg. no. 1  Outcome: miscarriage  Gest. age 12 w + 0 d  Details: T18  Preg. no. 2  Outcome: miscarriage  Gest. age 6 w + 0 d  Preg. no. 3  Outcome: miscarriage  Gest. age 5 w + 0 d  Preg. no. 4  Outcome: miscarriage  Details: T18  Risk Factors  History risk factors: AMA  Details: prior T18 x2    Pregnancy History  ==============    Maternal Lab Tests  Test: Materni T21  Result of other maternal screening test: Negative    Maternal Assessment  =================    Weight 84 kg  Weight (lb) 185 lb  BP syst 152 mmHg  BP diast 94 mmHg    Fetal Growth Overview  =================    Exam date        GA              BPD (mm)        HC (mm)         AC (mm)        FL (mm)        HL (mm)         EFW (g)  2019          18w 4d        42.9                 160.2              143.6             25.4              24.7               257  2019          23w 0d        57.9                 213.2             191.8              37.5                                   565    44%  2020          31w 2d        86.1                 308.6             288.3              54.7                                   1,879    41%      Method  ======    Transabdominal and transvaginal ultrasound examination, 2D Color Doppler, [place]uson E10. View: Good view    Pregnancy  =========    Cruz pregnancy. Number of fetuses: 1    Dating  ======    LMP on: 2019  Cycle: regular cycle  GA by LMP 31 w + 2 d  FUNMILAYO by LMP: 2020  Ultrasound examination on: 2020  GA by U/S based upon: AC, BPD, Femur, HC  GA by U/S 32 w + 5 d  FUNMILAYO by U/S: 3/27/2020  Assigned: based on the LMP, selected on 2019  Assigned GA 31 w + 2 d  Assigned FUNMILAYO: 2020  Pregnancy length 280 d    General Evaluation  ==============    Cardiac activity present.  bpm.  Fetal  movements visualized.  Presentation cephalic.  Placenta Placental site: posterior at minimum 4.28 cm away from internal os.  Umbilical cord Cord vessels: 3 vessel cord.  Amniotic fluid Amount of AF: normal amount. MVP 7.0 cm.    Biophysical Profile  ==============    2: Fetal breathing movements  2: Gross body movements  2: Fetal tone  2: Amniotic fluid volume  8/8 Biophysical profile score    Fetal Biometry  ============    Standard  BPD 86.1 mm  34w 5d                Hadlock    .7 mm  34w 6d                Kathleen    .6 mm  34w 3d                Hadlock    .3 mm  32w 6d                Hadlock    Femur 54.7 mm  28w 6d                Hadlock    HC / AC 1.07    EFW 1,879 g          41%        Marcos    EFW (lb) 4 lb  EFW (oz) 2 oz  EFW by: Hadlock (BPD-HC-AC-FL)  Head / Face / Neck  Cephalic index 0.81    Extremities / Bony Struc  FL / BPD 0.64    FL / HC 0.18    FL / AC 0.19    Other Structures   bpm    Fetal Anatomy  ============    Cranium: normal  4-chamber view: normal  Heart / Thorax  3-vessel view: suboptimal  3-vessel-trachea view: suboptimal  Stomach: normal  Kidneys: normal  Bladder: normal  Gender: male  Wants to know gender: yes    Consultation  ==========    152/94, re-check 124/80    Dec FM yesterday-resolved today. Reviewed [facility]KC and OB ED precautions.    Reviewed no evidence of placenta previa or vasa previa. To discuss mode of delivery with primary OB.    BP-denies symptoms of preeclampsia. Reviewed precautions. Start checking at home with [facility]OM[facility]. OB ED if > 140/90 or  symptoms.    Lower extremity edema-asymmetric and recent air travel. Check Dopplers-d/w Dr. Burch who will order.    Time  I overall spent approximately 25 minutes in face to face time with the patient and her family, greater than 50% of which was in counseling and  care coordination.    Impression  =========    Cruz, living IUP.  Normal repeat limited anatomy survey.  EFW plots at the 41st  percentile.  No evidence of vasa previa or placenta previa.  BPP 8/8.  Normal AFV.    Recommendation  ==============    Follow-up ultrasound for fetal growth in 3-4 weeks with MFM visit.  Prenatal testing to start next week with primary OB.  Reviewed fetal kick counts, preeclampsia precautions.  D/w primary OB-consider preeclampsia labs if BP elevated, r/o ROM, and lower extremity Dopplers due to asymmetric lower extremity edema  and recent travel.

## 2020-02-07 LAB
BACTERIAL VAGINOSIS DNA: NEGATIVE
CANDIDA GLABRATA DNA: NEGATIVE
CANDIDA KRUSEI DNA: NEGATIVE
CANDIDA RRNA VAG QL PROBE: NEGATIVE
T VAGINALIS RRNA GENITAL QL PROBE: NEGATIVE

## 2020-02-10 ENCOUNTER — TELEPHONE (OUTPATIENT)
Dept: OBSTETRICS AND GYNECOLOGY | Facility: CLINIC | Age: 45
End: 2020-02-10

## 2020-02-10 ENCOUNTER — HOSPITAL ENCOUNTER (EMERGENCY)
Facility: OTHER | Age: 45
Discharge: HOME | End: 2020-02-10
Attending: OBSTETRICS & GYNECOLOGY
Payer: COMMERCIAL

## 2020-02-10 ENCOUNTER — ANESTHESIA (OUTPATIENT)
Dept: OBSTETRICS AND GYNECOLOGY | Facility: OTHER | Age: 45
End: 2020-02-10

## 2020-02-10 ENCOUNTER — HOSPITAL ENCOUNTER (INPATIENT)
Facility: OTHER | Age: 45
LOS: 14 days | Discharge: HOME | End: 2020-02-24
Attending: OBSTETRICS & GYNECOLOGY | Admitting: OBSTETRICS & GYNECOLOGY
Payer: COMMERCIAL

## 2020-02-10 ENCOUNTER — PATIENT MESSAGE (OUTPATIENT)
Dept: OBSTETRICS AND GYNECOLOGY | Facility: CLINIC | Age: 45
End: 2020-02-10

## 2020-02-10 ENCOUNTER — ANESTHESIA EVENT (OUTPATIENT)
Dept: OBSTETRICS AND GYNECOLOGY | Facility: OTHER | Age: 45
End: 2020-02-10

## 2020-02-10 DIAGNOSIS — O42.919 PRETERM PREMATURE RUPTURE OF MEMBRANES (PPROM) WITH UNKNOWN ONSET OF LABOR: ICD-10-CM

## 2020-02-10 DIAGNOSIS — O36.8130 DECREASED FETAL MOVEMENTS IN THIRD TRIMESTER, SINGLE OR UNSPECIFIED FETUS: ICD-10-CM

## 2020-02-10 DIAGNOSIS — O36.8390 NON-REASSURING ELECTRONIC FETAL MONITORING TRACING: ICD-10-CM

## 2020-02-10 DIAGNOSIS — Z3A.32 32 WEEKS GESTATION OF PREGNANCY: Primary | ICD-10-CM

## 2020-02-10 DIAGNOSIS — Z98.891 S/P CESAREAN SECTION: Primary | ICD-10-CM

## 2020-02-10 DIAGNOSIS — Z3A.32 32 WEEKS GESTATION OF PREGNANCY: ICD-10-CM

## 2020-02-10 PROBLEM — O13.3 GESTATIONAL HYPERTENSION, THIRD TRIMESTER: Status: ACTIVE | Noted: 2020-02-10

## 2020-02-10 PROBLEM — Z87.59 HISTORY OF POOR PREGNANCY OUTCOME: Status: ACTIVE | Noted: 2020-02-10

## 2020-02-10 LAB
ABO + RH BLD: NORMAL
ALBUMIN SERPL BCP-MCNC: 2.7 G/DL (ref 3.5–5.2)
ALP SERPL-CCNC: 101 U/L (ref 55–135)
ALT SERPL W/O P-5'-P-CCNC: 15 U/L (ref 10–44)
ANION GAP SERPL CALC-SCNC: 11 MMOL/L (ref 8–16)
AST SERPL-CCNC: 27 U/L (ref 10–40)
BASOPHILS # BLD AUTO: 0.04 K/UL (ref 0–0.2)
BASOPHILS NFR BLD: 0.3 % (ref 0–1.9)
BILIRUB SERPL-MCNC: 0.5 MG/DL (ref 0.1–1)
BLD GP AB SCN CELLS X3 SERPL QL: NORMAL
BUN SERPL-MCNC: 12 MG/DL (ref 6–20)
CALCIUM SERPL-MCNC: 9.3 MG/DL (ref 8.7–10.5)
CHLORIDE SERPL-SCNC: 109 MMOL/L (ref 95–110)
CO2 SERPL-SCNC: 19 MMOL/L (ref 23–29)
CREAT SERPL-MCNC: 0.7 MG/DL (ref 0.5–1.4)
DIFFERENTIAL METHOD: ABNORMAL
EOSINOPHIL # BLD AUTO: 0.3 K/UL (ref 0–0.5)
EOSINOPHIL NFR BLD: 2.1 % (ref 0–8)
ERYTHROCYTE [DISTWIDTH] IN BLOOD BY AUTOMATED COUNT: 12.5 % (ref 11.5–14.5)
EST. GFR  (AFRICAN AMERICAN): >60 ML/MIN/1.73 M^2
EST. GFR  (NON AFRICAN AMERICAN): >60 ML/MIN/1.73 M^2
GLUCOSE SERPL-MCNC: 73 MG/DL (ref 70–110)
HCT VFR BLD AUTO: 39.1 % (ref 37–48.5)
HGB BLD-MCNC: 12.9 G/DL (ref 12–16)
IMM GRANULOCYTES # BLD AUTO: 0.18 K/UL (ref 0–0.04)
IMM GRANULOCYTES NFR BLD AUTO: 1.3 % (ref 0–0.5)
LYMPHOCYTES # BLD AUTO: 2.2 K/UL (ref 1–4.8)
LYMPHOCYTES NFR BLD: 16.4 % (ref 18–48)
MCH RBC QN AUTO: 27.9 PG (ref 27–31)
MCHC RBC AUTO-ENTMCNC: 33 G/DL (ref 32–36)
MCV RBC AUTO: 85 FL (ref 82–98)
MONOCYTES # BLD AUTO: 1.1 K/UL (ref 0.3–1)
MONOCYTES NFR BLD: 8.1 % (ref 4–15)
NEUTROPHILS # BLD AUTO: 9.8 K/UL (ref 1.8–7.7)
NEUTROPHILS NFR BLD: 71.8 % (ref 38–73)
NRBC BLD-RTO: 0 /100 WBC
PLATELET # BLD AUTO: 242 K/UL (ref 150–350)
PMV BLD AUTO: 10.7 FL (ref 9.2–12.9)
POTASSIUM SERPL-SCNC: 3.8 MMOL/L (ref 3.5–5.1)
PROT SERPL-MCNC: 6.6 G/DL (ref 6–8.4)
RBC # BLD AUTO: 4.62 M/UL (ref 4–5.4)
SODIUM SERPL-SCNC: 139 MMOL/L (ref 136–145)
WBC # BLD AUTO: 13.6 K/UL (ref 3.9–12.7)

## 2020-02-10 PROCEDURE — 86901 BLOOD TYPING SEROLOGIC RH(D): CPT

## 2020-02-10 PROCEDURE — 59025 PR FETAL 2N-STRESS TEST: ICD-10-PCS | Mod: 26,,, | Performed by: OBSTETRICS & GYNECOLOGY

## 2020-02-10 PROCEDURE — 99222 1ST HOSP IP/OBS MODERATE 55: CPT | Mod: 25,,, | Performed by: OBSTETRICS & GYNECOLOGY

## 2020-02-10 PROCEDURE — 59025 FETAL NON-STRESS TEST: CPT | Mod: 26,,, | Performed by: OBSTETRICS & GYNECOLOGY

## 2020-02-10 PROCEDURE — 99222 PR INITIAL HOSPITAL CARE,LEVL II: ICD-10-PCS | Mod: 25,,, | Performed by: OBSTETRICS & GYNECOLOGY

## 2020-02-10 PROCEDURE — 99285 EMERGENCY DEPT VISIT HI MDM: CPT | Mod: 25

## 2020-02-10 PROCEDURE — 63600175 PHARM REV CODE 636 W HCPCS: Performed by: STUDENT IN AN ORGANIZED HEALTH CARE EDUCATION/TRAINING PROGRAM

## 2020-02-10 PROCEDURE — 80053 COMPREHEN METABOLIC PANEL: CPT

## 2020-02-10 PROCEDURE — 59025 FETAL NON-STRESS TEST: CPT | Mod: 76

## 2020-02-10 PROCEDURE — 11000001 HC ACUTE MED/SURG PRIVATE ROOM

## 2020-02-10 PROCEDURE — 85025 COMPLETE CBC W/AUTO DIFF WBC: CPT

## 2020-02-10 PROCEDURE — 99284 PR EMERGENCY DEPT VISIT,LEVEL IV: ICD-10-PCS | Mod: 25,,, | Performed by: OBSTETRICS & GYNECOLOGY

## 2020-02-10 PROCEDURE — 86592 SYPHILIS TEST NON-TREP QUAL: CPT

## 2020-02-10 PROCEDURE — 59025 FETAL NON-STRESS TEST: CPT

## 2020-02-10 PROCEDURE — 99284 EMERGENCY DEPT VISIT MOD MDM: CPT | Mod: 25,27

## 2020-02-10 PROCEDURE — 86703 HIV-1/HIV-2 1 RESULT ANTBDY: CPT

## 2020-02-10 PROCEDURE — 99284 EMERGENCY DEPT VISIT MOD MDM: CPT | Mod: 25,,, | Performed by: OBSTETRICS & GYNECOLOGY

## 2020-02-10 PROCEDURE — 25000003 PHARM REV CODE 250: Performed by: STUDENT IN AN ORGANIZED HEALTH CARE EDUCATION/TRAINING PROGRAM

## 2020-02-10 RX ORDER — DIPHENHYDRAMINE HCL 25 MG
25 CAPSULE ORAL EVERY 4 HOURS PRN
Status: DISCONTINUED | OUTPATIENT
Start: 2020-02-10 | End: 2020-02-20 | Stop reason: ALTCHOICE

## 2020-02-10 RX ORDER — AZITHROMYCIN 250 MG/1
1000 TABLET, FILM COATED ORAL ONCE
Status: COMPLETED | OUTPATIENT
Start: 2020-02-10 | End: 2020-02-10

## 2020-02-10 RX ORDER — AMOXICILLIN 250 MG
1 CAPSULE ORAL NIGHTLY PRN
Status: DISCONTINUED | OUTPATIENT
Start: 2020-02-10 | End: 2020-02-20 | Stop reason: ALTCHOICE

## 2020-02-10 RX ORDER — PRENATAL WITH FERROUS FUM AND FOLIC ACID 3080; 920; 120; 400; 22; 1.84; 3; 20; 10; 1; 12; 200; 27; 25; 2 [IU]/1; [IU]/1; MG/1; [IU]/1; MG/1; MG/1; MG/1; MG/1; MG/1; MG/1; UG/1; MG/1; MG/1; MG/1; MG/1
1 TABLET ORAL DAILY
Status: DISCONTINUED | OUTPATIENT
Start: 2020-02-11 | End: 2020-02-20 | Stop reason: ALTCHOICE

## 2020-02-10 RX ORDER — ONDANSETRON 8 MG/1
8 TABLET, ORALLY DISINTEGRATING ORAL EVERY 8 HOURS PRN
Status: DISCONTINUED | OUTPATIENT
Start: 2020-02-10 | End: 2020-02-20 | Stop reason: ALTCHOICE

## 2020-02-10 RX ORDER — ACETAMINOPHEN 325 MG/1
650 TABLET ORAL EVERY 6 HOURS PRN
Status: DISCONTINUED | OUTPATIENT
Start: 2020-02-10 | End: 2020-02-20 | Stop reason: ALTCHOICE

## 2020-02-10 RX ORDER — DIPHENHYDRAMINE HYDROCHLORIDE 50 MG/ML
25 INJECTION INTRAMUSCULAR; INTRAVENOUS EVERY 4 HOURS PRN
Status: DISCONTINUED | OUTPATIENT
Start: 2020-02-10 | End: 2020-02-20 | Stop reason: ALTCHOICE

## 2020-02-10 RX ORDER — SIMETHICONE 80 MG
1 TABLET,CHEWABLE ORAL EVERY 6 HOURS PRN
Status: DISCONTINUED | OUTPATIENT
Start: 2020-02-10 | End: 2020-02-20 | Stop reason: ALTCHOICE

## 2020-02-10 RX ORDER — AMOXICILLIN 250 MG/1
500 CAPSULE ORAL EVERY 8 HOURS
Status: DISCONTINUED | OUTPATIENT
Start: 2020-02-12 | End: 2020-02-12

## 2020-02-10 RX ORDER — BETAMETHASONE SODIUM PHOSPHATE AND BETAMETHASONE ACETATE 3; 3 MG/ML; MG/ML
12 INJECTION, SUSPENSION INTRA-ARTICULAR; INTRALESIONAL; INTRAMUSCULAR; SOFT TISSUE EVERY 24 HOURS
Status: COMPLETED | OUTPATIENT
Start: 2020-02-10 | End: 2020-02-11

## 2020-02-10 RX ORDER — SODIUM CHLORIDE 0.9 % (FLUSH) 0.9 %
10 SYRINGE (ML) INJECTION
Status: DISCONTINUED | OUTPATIENT
Start: 2020-02-10 | End: 2020-02-24 | Stop reason: HOSPADM

## 2020-02-10 RX ORDER — AZITHROMYCIN 250 MG/1
1000 TABLET, FILM COATED ORAL ONCE
Status: COMPLETED | OUTPATIENT
Start: 2020-02-15 | End: 2020-02-15

## 2020-02-10 RX ORDER — SODIUM CHLORIDE 9 MG/ML
INJECTION, SOLUTION INTRAVENOUS CONTINUOUS
Status: DISCONTINUED | OUTPATIENT
Start: 2020-02-10 | End: 2020-02-11

## 2020-02-10 RX ADMIN — BETAMETHASONE SODIUM PHOSPHATE AND BETAMETHASONE ACETATE 12 MG: 3; 3 INJECTION, SUSPENSION INTRA-ARTICULAR; INTRALESIONAL; INTRAMUSCULAR at 07:02

## 2020-02-10 RX ADMIN — AMPICILLIN SODIUM 2 G: 2 INJECTION, POWDER, FOR SOLUTION INTRAMUSCULAR; INTRAVENOUS at 08:02

## 2020-02-10 RX ADMIN — AZITHROMYCIN MONOHYDRATE 1000 MG: 250 TABLET ORAL at 08:02

## 2020-02-10 NOTE — ED PROVIDER NOTES
Encounter Date: 2/10/2020       History     Chief Complaint   Patient presents with    Decreased Fetal Movement     HPI   Marisela Siddiqui is a 44 y.o. [id number] at 32w0d presents complaining of decreased fetal movement. Reports 3 movements over the past 3 hours.   This IUP is complicated by AMA.  Patient denies contractions, denies vaginal bleeding, denies LOF.        Review of patient's allergies indicates:   Allergen Reactions    Sulfa (sulfonamide antibiotics) Swelling     Past Medical History:   Diagnosis Date    Miscarriage     Miscarriage within last 12 months      Past Surgical History:   Procedure Laterality Date    ANTERIOR CRUCIATE LIGAMENT REPAIR Right 2012    DILATION AND CURETTAGE OF UTERUS      DILATION AND EVACUATION  2018    oocyte retrievals      SPINAL FUSION  2013    thoracic? due to burst fracture of vertebrae     Family History   Problem Relation Age of Onset    Miscarriages / Stillbirths Paternal Grandmother     Diabetes Mother     Irritable bowel syndrome Father         possible Crohns    Brain cancer Maternal Grandmother     Diabetes Paternal Grandfather     Breast cancer Neg Hx     Colon cancer Neg Hx     Eclampsia Neg Hx     Stroke Neg Hx     Hypertension Neg Hx     Ovarian cancer Neg Hx      Social History     Tobacco Use    Smoking status: Never Smoker    Smokeless tobacco: Never Used   Substance Use Topics    Alcohol use: Not Currently     Frequency: 2-3 times a week     Drinks per session: 3 or 4    Drug use: No     Review of Systems   Constitutional: Negative for chills and fever.   HENT: Negative for postnasal drip, rhinorrhea, sinus pressure and sore throat.    Eyes: Negative for photophobia and visual disturbance.   Respiratory: Negative for cough and shortness of breath.    Cardiovascular: Negative for chest pain and palpitations.   Gastrointestinal: Negative for nausea and vomiting.   Genitourinary: Negative for dysuria, frequency and urgency.    Musculoskeletal: Negative for back pain.   Skin: Negative for pallor and rash.   Neurological: Negative for dizziness, light-headedness and headaches.   Psychiatric/Behavioral: The patient is not nervous/anxious.        Physical Exam     Initial Vitals   BP Pulse Resp Temp SpO2   -- -- -- -- --      MAP       --       VSS Afeb  Physical Exam    Constitutional: She appears well-developed and well-nourished.   HENT:   Head: Normocephalic and atraumatic.   Cardiovascular: Normal rate, regular rhythm, normal heart sounds and intact distal pulses.   Pulmonary/Chest: Breath sounds normal.   Abdominal: Soft.   Neurological: She is alert and oriented to person, place, and time.   Skin: Skin is warm and dry.   Psychiatric: She has a normal mood and affect. Her behavior is normal. Judgment and thought content normal.         ED Course   Obtain Fetal nonstress test (NST)  Date/Time: 2/10/2020 6:33 PM  Performed by: Jackie Kim MD  Authorized by: Jackie Kim MD     Nonstress Test:     Variability:  6-25 BPM    Decelerations:  None    Accelerations:  15 bpm    Baseline:  155    Contractions:  Not present  Biophysical Profile:     Nonstress Test Interpretation: reactive      Overall Impression:  Reassuring      Labs Reviewed - No data to display       Imaging Results    None          Medical Decision Making:   ED Management:  VSS  NST reactive and reassuring, pt felt more movement after arriving to [facility]  BPP 8/8  Pt reassured, has PNT appointment tomorrow  Kick counts              Attending Attestation:   Physician Attestation Statement for Resident:  As the supervising MD   Physician Attestation Statement: I have personally seen and examined this patient.   I agree with the above history. -:   As the supervising MD I agree with the above PE.    As the supervising MD I agree with the above treatment, course, plan, and disposition.   -:   NST  I independently reviewed the fetal non-stress test with the following  interpretation:  155 BPM baseline  Variability: moderate  Accelerations: present  Decelerations: absent  Contractions: none  Category 1    Clinical Interpretation:reactive    Patient evaluated and found to be stable, agree with resident's assessment of decreased fetal movement now resolved and plan to discharge to home with movement counts.  I was personally present during the critical portions of the procedure(s) performed by the resident and was immediately available in the ED to provide services and assistance as needed during the entire procedure.  I have reviewed the following: old records at this facility.                                  Clinical Impression:       ICD-10-CM ICD-9-CM   1. 32 weeks gestation of pregnancy Z3A.32 V22.2   2. Decreased fetal movements in third trimester, single or unspecified fetus O36.8130 655.73                             Jackie Kim MD  Resident  02/10/20 1835       Deanna Perry MD  02/10/20 [id number]

## 2020-02-10 NOTE — TELEPHONE ENCOUNTER
----- Message from Magdalena Dinh sent at 2/10/2020 11:00 AM CST -----  Contact: RINKU DIAL [[id number]]  Name of Who is Calling:  RINKU DIAL [[id number]]      What is the request in detail:  Patient called request a call as this pertains to the Fail Kick count.       Reply by [facility]: no      Call Back if not in [facility]: [phone]

## 2020-02-11 LAB
CREAT UR-MCNC: 44.9 MG/DL (ref 15–325)
CREAT UR-MCNC: 72.3 MG/DL (ref 15–325)
HIV 1+2 AB+HIV1 P24 AG SERPL QL IA: NEGATIVE
PROT UR-MCNC: 33 MG/DL (ref 0–15)
PROT UR-MCNC: <7 MG/DL (ref 0–15)
PROT/CREAT UR: 0.46 MG/G{CREAT} (ref 0–0.2)
PROT/CREAT UR: NORMAL MG/G{CREAT} (ref 0–0.2)
RPR SER QL: NORMAL

## 2020-02-11 PROCEDURE — 82570 ASSAY OF URINE CREATININE: CPT | Mod: 91

## 2020-02-11 PROCEDURE — 25000003 PHARM REV CODE 250: Performed by: STUDENT IN AN ORGANIZED HEALTH CARE EDUCATION/TRAINING PROGRAM

## 2020-02-11 PROCEDURE — 84156 ASSAY OF PROTEIN URINE: CPT

## 2020-02-11 PROCEDURE — 11000001 HC ACUTE MED/SURG PRIVATE ROOM

## 2020-02-11 PROCEDURE — 63600175 PHARM REV CODE 636 W HCPCS: Performed by: STUDENT IN AN ORGANIZED HEALTH CARE EDUCATION/TRAINING PROGRAM

## 2020-02-11 RX ADMIN — AMPICILLIN SODIUM 2 G: 2 INJECTION, POWDER, FOR SOLUTION INTRAMUSCULAR; INTRAVENOUS at 09:02

## 2020-02-11 RX ADMIN — AMPICILLIN SODIUM 2 G: 2 INJECTION, POWDER, FOR SOLUTION INTRAMUSCULAR; INTRAVENOUS at 03:02

## 2020-02-11 RX ADMIN — BETAMETHASONE SODIUM PHOSPHATE AND BETAMETHASONE ACETATE 12 MG: 3; 3 INJECTION, SUSPENSION INTRA-ARTICULAR; INTRALESIONAL; INTRAMUSCULAR at 08:02

## 2020-02-11 RX ADMIN — PRENATAL VIT W/ FE FUMARATE-FA TAB 27-0.8 MG 1 TABLET: 27-0.8 TAB at 09:02

## 2020-02-11 RX ADMIN — AMPICILLIN SODIUM 2 G: 2 INJECTION, POWDER, FOR SOLUTION INTRAMUSCULAR; INTRAVENOUS at 02:02

## 2020-02-11 NOTE — HPI
Marisela Siddiqui is a 44 y.o.  who presents for leakage of fluid. She states that around 6 pm today she experienced a large gush of clear fluid with continued leakage. No contractions, vaginal bleeding. Reports good fetal movement. 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

## 2020-02-11 NOTE — H&P
[facility]  Obstetrics  History & Physical    Patient Name: Marisela Siddiqui  MRN: [id number]  Admission Date: 2/10/2020  Primary Care Provider: Ashlyn Mayorga MD    Subjective:     Principal Problem: premature rupture of membranes (PPROM) with unknown onset of labor    History of Present Illness:  Marisela Siddiqui is a 44 y.o.  who presents for leakage of fluid. She states that around 6 pm today she experienced a large gush of clear fluid with continued leakage. No contractions, vaginal bleeding. Reports good fetal movement. 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      OB History    Para Term  AB Living   5 0 0 0 4 0   SAB TAB Ectopic Multiple Live Births   0 1 0 0 0      # Outcome Date GA Lbr Alfredo/2nd Weight Sex Delivery Anes PTL Lv   5 Current            4 TAB 2018     TAB         Birth Comments: Trisomy 18   3 AB            2 AB            1 AB              Past Medical History:   Diagnosis Date    Miscarriage     Miscarriage within last 12 months      Past Surgical History:   Procedure Laterality Date    ANTERIOR CRUCIATE LIGAMENT REPAIR Right 2012    DILATION AND CURETTAGE OF UTERUS      DILATION AND EVACUATION  2018    oocyte retrievals      SPINAL FUSION  2013    thoracic? due to burst fracture of vertebrae       Facility-Administered Medications Prior to Admission   Medication    triamcinolone acetonide injection 10 mg     PTA Medications   Medication Sig    augmented betamethasone (DIPROLENE) 0.05 % gel Apply topically 2 (two) times daily. To scalp PRN severe irritation BID to AA    ciclopirox 1 % shampoo Qd prn    DERMA-SMOOTHE/FS SCALP OIL 0.01 % Oil Apply oil to damp scalp nightly and cover with shower cap. (Patient not taking: Reported on 2020)    fluocinolone (DERMA-SMOOTHE) 0.01 % external oil Apply topically 3 (three) times daily.  (Patient not taking: Reported on 2/5/2020)    fluocinonide (LIDEX) 0.05 % external solution Apply topically 2 (two) times daily. To scalp to rash  PRN (Patient not taking: Reported on 2/5/2020)    hydrocortisone 2.5 % cream Apply topically 2 (two) times daily. To rash on chest and behind ears PRN    ketoconazole (NIZORAL) 2 % cream AAA bid left ear & right nose    ketoconazole (NIZORAL) 2 % shampoo Apply topically once daily.    prenatal vit,calc76/iron/folic (PNV 29-1 ORAL) Take by mouth.    valACYclovir (VALTREX) 1000 MG tablet TAKE 2 TABLETS BY MOUTH TWICE DAILY FOR 1 DAY AS NEEDED FOR COLD SORES (Patient not taking: Reported on 2/5/2020)       Review of patient's allergies indicates:   Allergen Reactions    Sulfa (sulfonamide antibiotics) Swelling        Family History     Problem Relation (Age of Onset)    Brain cancer Maternal Grandmother    Diabetes Mother, Paternal Grandfather    Irritable bowel syndrome Father    Miscarriages / Stillbirths Paternal Grandmother        Tobacco Use    Smoking status: Never Smoker    Smokeless tobacco: Never Used   Substance and Sexual Activity    Alcohol use: Not Currently     Frequency: 2-3 times a week     Drinks per session: 3 or 4    Drug use: No    Sexual activity: Yes     Partners: Male     Birth control/protection: None     Review of Systems   Constitutional: Negative for chills and fever.   Eyes: Negative for visual disturbance.   Respiratory: Negative for shortness of breath.    Cardiovascular: Negative for chest pain and palpitations.   Gastrointestinal: Negative for abdominal pain, constipation, diarrhea, nausea and vomiting.   Genitourinary: Positive for vaginal discharge. Negative for dysuria, pelvic pain and vaginal bleeding.   Musculoskeletal: Negative for back pain.   Integumentary:  Negative for rash.   Neurological: Negative for seizures, syncope and headaches.   Hematological: Does not bruise/bleed easily.   Psychiatric/Behavioral: Negative for  depression. The patient is not nervous/anxious.       Objective:     Vital Signs (Most Recent):  Temp: 98 °F (36.7 °C) (02/10/20 1843)  Pulse: 84 (02/10/20 1843)  Resp: 16 (02/10/20 1843)  BP: (!) 137/90 (02/10/20 1843)  SpO2: 97 % (02/10/20 1843) Vital Signs (24h Range):  Temp:  [98 °F (36.7 °C)] 98 °F (36.7 °C)  Pulse:  [84] 84  Resp:  [16] 16  SpO2:  [97 %] 97 %  BP: (137)/(90) 137/90        There is no height or weight on file to calculate BMI.    FHT: 130 bpm, moderate variability, - accels, no decels Cat 1 (reassuring)  TOCO: No contractions    Physical Exam:   Constitutional: She is oriented to person, place, and time. She appears well-developed and well-nourished. No distress.    HENT:   Head: Normocephalic and atraumatic.   Nose: No epistaxis.    Eyes: Conjunctivae and EOM are normal.    Neck: Normal range of motion. Neck supple. No thyromegaly present.    Cardiovascular: Normal rate and regular rhythm.     Pulmonary/Chest: Effort normal. No respiratory distress.        Abdominal: Soft. She exhibits no distension. There is no tenderness.             Musculoskeletal: Normal range of motion and moves all extremeties. She exhibits no edema or tenderness.       Neurological: She is alert and oriented to person, place, and time.    Skin: Skin is warm and dry. No rash noted.    Psychiatric: She has a normal mood and affect. Her behavior is normal.       SSE: Grossly ruptured  SVE: Fingertip     Significant Labs:  Lab Results   Component Value Date    GROUPTRH O POS 2019    HEPBSAG Negative 2019       I have personallly reviewed all pertinent lab results from the last 24 hours.    Assessment/Plan:     44 y.o. female  at 32w0d for:    *  premature rupture of membranes (PPROM)  -- Admit to antepartum unit. No s/s of infection currently. No contractions. Cervix fingertip.  -- BMZ for fetal lung maturity  -- PPROM antibiotics per protocol   - Azithromycin 1 g PO today, and again on day #5   -  Ampicillin 2 g IV q 6 hours for 48 hours   - Amoxicillin 500 mg PO BID days #3-10  -- Continuous fetal monitoring overnight  -- Consents for delivery signed.   -- Fetus is vertex as of 2/10  -- Most recent growth scan on 2/5 with EFW in the 41%    Gestational hypertension, third trimester  -- One elevated diastolic blood pressure at a clinic visit on 2/5. Diastolic blood pressure 90 in the OB ED.  -- Patient now meets criteria for gestational hypertension. Asymptomatic.  -- CBC, CMP within normal limits  -- P/C ratio pending  -- Will continue to closely monitor blood pressure    Post partum depression  -- Diagnosed after having a 12 week miscarriage  -- Patient also has history of 2 trisomy 18 pregnancies  -- No current medications  -- Will need 1-2 week mood check on discharge        Jennifer Verduzco MD  Obstetrics  [facility]

## 2020-02-11 NOTE — CARE UPDATE
Fetal monitoring reviewed. Isolated 1 1/2 minute decel noted at 1021. Resolved spontaneously. Tracing now with moderate variability, +accels, no recurrence of decels, overall reassuring. Will keep patient on continuous fetal monitoring for now and continue to monitor closely    Jackie Kim MD   OBGYN, PGY-2

## 2020-02-11 NOTE — PLAN OF CARE
spoke with nurse and provided a compassionate presence and reflective listening for patient as well as explored patient's concerns.

## 2020-02-11 NOTE — ASSESSMENT & PLAN NOTE
-- Diagnosed after having a 12 week miscarriage  -- Patient also has history of 2 trisomy 18 pregnancies  -- No current medications  -- Will need 1-2 week mood check on discharge

## 2020-02-11 NOTE — PROGRESS NOTES
[facility]  Obstetrics  Antepartum Progress Note    Patient Name: Marisela Siddiqui  MRN: [id number]  Admission Date: 2/10/2020  Hospital Length of Stay: 1 days  Attending Physician: No att. providers found  Primary Care Provider: Ashlyn Mayorga MD    Subjective:     Principal Problem: premature rupture of membranes (PPROM) with unknown onset of labor    HPI:  Marisela Siddiqui is a 44 y.o.  who presents for leakage of fluid. She states that around 6 pm today she experienced a large gush of clear fluid with continued leakage. No contractions, vaginal bleeding. Reports good fetal movement. 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    Hospital Course:  2020: admitted for PPROM, abx initiated  2020 - NAEON, fetal tracing reassuring    Obstetric HPI:  Patient reports some intermittent cramping but denies frequent ctx,  active fetal movement, absent vaginal bleeding , continued LOF     Objective:     Vital Signs (Most Recent):  Temp: 98.1 °F (36.7 °C) (20 0550)  Pulse: 91 (20 0558)  Resp: 16 (20 0550)  BP: 119/65 (20 0550)  SpO2: 98 % (20 0558) Vital Signs (24h Range):  Temp:  [97.9 °F (36.6 °C)-98.4 °F (36.9 °C)] 98.1 °F (36.7 °C)  Pulse:  [79-97] 91  Resp:  [16] 16  SpO2:  [95 %-98 %] 98 %  BP: (119-153)/(65-90) 119/65        There is no height or weight on file to calculate BMI.    FHT: 125 Cat 1 (reassuring)  TOCO:  none    No intake or output data in the 24 hours ending 20 0705    Cervical Exam: /th on admission     Significant Labs:  Recent Lab Results       20  0051   02/10/20  1930        Albumin   2.7     Alkaline Phosphatase   101     ALT   15     Anion Gap   11     AST   27     Baso #   0.04     Basophil%   0.3     BILIRUBIN TOTAL   0.5  Comment:  For infants and newborns, interpretation of results should be based  on gestational  age, weight and in agreement with clinical  observations.  Premature Infant recommended reference ranges:  Up to 24 hours.............<8.0 mg/dL  Up to 48 hours............<12.0 mg/dL  3-5 days..................<15.0 mg/dL  6-29 days.................<15.0 mg/dL       BUN, Bld   12     Calcium   9.3     Chloride   109     CO2   19     Creatinine   0.7     Creatinine, Random Ur 72.3  Comment:  The random urine reference ranges provided were established   for 24 hour urine collections.  No reference ranges exist for  random urine specimens.  Correlate clinically.         Differential Method   Automated     eGFR if    >60     eGFR if non    >60  Comment:  Calculation used to obtain the estimated glomerular filtration  rate (eGFR) is the CKD-EPI equation.        Eos #   0.3     Eosinophil%   2.1     Glucose   73     Gran # (ANC)   9.8     Gran%   71.8     Group & Rh   O POS     Hematocrit   39.1     Hemoglobin   12.9     Immature Grans (Abs)   0.18  Comment:  Mild elevation in immature granulocytes is non specific and   can be seen in a variety of conditions including stress response,   acute inflammation, trauma and pregnancy. Correlation with other   laboratory and clinical findings is essential.       Immature Granulocytes   1.3     INDIRECT TIMI   NEG     Lymph #   2.2     Lymph%   16.4     MCH   27.9     MCHC   33.0     MCV   85     Mono #   1.1     Mono%   8.1     MPV   10.7     nRBC   0     Platelets   242     Potassium   3.8  Comment:  Specimen slightly hemolyzed     Prot/Creat Ratio, Ur 0.46       PROTEIN TOTAL   6.6     Protein, Urine Random 33  Comment:  The random urine reference ranges provided were established   for 24 hour urine collections.  No reference ranges exist for  random urine specimens.  Correlate clinically.         RBC   4.62     RDW   12.5     Sodium   139     WBC   13.60           Physical Exam:   Constitutional: She is oriented to person, place, and time.  She appears well-developed and well-nourished. No distress.    HENT:   Head: Normocephalic and atraumatic.    Eyes: Conjunctivae and EOM are normal.    Neck: Normal range of motion. Neck supple. No thyromegaly present.    Cardiovascular: Normal rate and regular rhythm.     Pulmonary/Chest: Effort normal. No respiratory distress.        Abdominal: Soft. She exhibits no distension. There is no tenderness.             Musculoskeletal: Normal range of motion and moves all extremeties. She exhibits no edema or tenderness.       Neurological: She is alert and oriented to person, place, and time.    Skin: Skin is warm and dry. No rash noted.    Psychiatric: She has a normal mood and affect. Her behavior is normal.       Assessment/Plan:     44 y.o. female  at 32w1d for:    *  premature rupture of membranes (PPROM)  -- Admit to antepartum unit. No s/s of infection currently. No contractions. Cervix fingertip.  -- BMZ for fetal lung maturity  -- PPROM antibiotics per protocol   - Azithromycin 1 g PO today, and again on day #5   - Ampicillin 2 g IV q 6 hours for 48 hours   - Amoxicillin 500 mg PO BID days #3-10  -- Continuous fetal monitoring overnight  -- Consents for delivery signed.   -- Fetus is vertex as of 2/10  -- Most recent growth scan on  with EFW in the 41%    Gestational hypertension, third trimester  -- One elevated diastolic blood pressure at a clinic visit on . Diastolic blood pressure 90 in the OB ED.  -- Patient now meets criteria for gestational hypertension. Asymptomatic. BP stable overnight Temp:  BP: (119-153)/(65-90) 119/65  -- CBC, CMP within normal limits  -- P/C ratio pending  -- Will continue to closely monitor blood pressure    Post partum depression  -- Diagnosed after having a 12 week miscarriage  -- Patient also has history of 2 trisomy 18 pregnancies  -- No current medications  -- Will need 1-2 week mood check on discharge          Jackie Kim MD  Obstetrics  [facility]

## 2020-02-11 NOTE — HOSPITAL COURSE
2020: admitted for PPROM, abx initiated  2020 - NAEON, fetal tracing reassuring  2020 - PPROM abx D#2. NAEON. FHT overall reassuring throughout the day yesterday. Pt switched to NST TID.   2020 - PPROM abx D#3. NAEON  2020 - PPROM abx D#4. NAEON  02/15/2020 - PPROM abx D#5. NAEON. No complaints this AM  2020 - PPROM abx D#6. No issues overnight. Yesterday, BP was sustained in the mild range - a change from previous days in the normal range with occasional mild range pressures. Repeat PreE labs ordered for collection this AM. Previous PCR was tltc. Patient denies HA, vision changes, SOB, CP, RUQ pain.   2020 - pt complained of dysuria overnight, UA neg. Otherwise no complaints this AM. 24 hour urine initiated secondary to P:C 0.16.  2020 - NAEON. BP mild range  2020 - NAEON. Pt with small amount of spotting in AM. Pt made NPO and kept on CEFM throughout the day. Monitoring remained reassuring. No continued episodes of bleeding. Pt deescalated to NST TID and given normal diet. When patient was placed on NST at 2100, intermittent variables were noted approx q30 mins.  2020 - pt kept on continuous monitoring overnight due to intermittent variable decelerations. Fetal monitoring otherwise reassuring. Decision made to proceed with delivery for fetal tracing.  performed without complications  2020 - POD#1. Pt doing well. Moderate pain. Bleeding stable. Ambulating and voiding without difficulty.  2020 - POD#2. Pt continues to do well. Moderate pain. Bleeding stable. Ambulating and voiding without difficulty.  2020 - POD#3. Continuing to meet all postop milestones. Doing well overall. Ambulating without difficulty. Voiding appropriately. Passing flatus.   2020 - POD#4. Continuing to meet all postop milestones. Doing well overall. Ambulating without difficulty. Voiding appropriately. Passing flatus.

## 2020-02-11 NOTE — SUBJECTIVE & OBJECTIVE
OB History    Para Term  AB Living   5 0 0 0 4 0   SAB TAB Ectopic Multiple Live Births   0 1 0 0 0      # Outcome Date GA Lbr Alfredo/2nd Weight Sex Delivery Anes PTL Lv   5 Current            4 TAB 2018     TAB         Birth Comments: Trisomy 18   3 AB            2 AB            1 AB              Past Medical History:   Diagnosis Date    Miscarriage     Miscarriage within last 12 months      Past Surgical History:   Procedure Laterality Date    ANTERIOR CRUCIATE LIGAMENT REPAIR Right 2012    DILATION AND CURETTAGE OF UTERUS      DILATION AND EVACUATION  2018    oocyte retrievals      SPINAL FUSION  2013    thoracic? due to burst fracture of vertebrae       Facility-Administered Medications Prior to Admission   Medication    triamcinolone acetonide injection 10 mg     PTA Medications   Medication Sig    augmented betamethasone (DIPROLENE) 0.05 % gel Apply topically 2 (two) times daily. To scalp PRN severe irritation BID to AA    ciclopirox 1 % shampoo Qd prn    DERMA-SMOOTHE/FS SCALP OIL 0.01 % Oil Apply oil to damp scalp nightly and cover with shower cap. (Patient not taking: Reported on 2020)    fluocinolone (DERMA-SMOOTHE) 0.01 % external oil Apply topically 3 (three) times daily. (Patient not taking: Reported on 2020)    fluocinonide (LIDEX) 0.05 % external solution Apply topically 2 (two) times daily. To scalp to rash  PRN (Patient not taking: Reported on 2020)    hydrocortisone 2.5 % cream Apply topically 2 (two) times daily. To rash on chest and behind ears PRN    ketoconazole (NIZORAL) 2 % cream AAA bid left ear & right nose    ketoconazole (NIZORAL) 2 % shampoo Apply topically once daily.    prenatal vit,calc76/iron/folic (PNV 29-1 ORAL) Take by mouth.    valACYclovir (VALTREX) 1000 MG tablet TAKE 2 TABLETS BY MOUTH TWICE DAILY FOR 1 DAY AS NEEDED FOR COLD SORES (Patient not taking: Reported on 2020)       Review of patient's allergies indicates:    Allergen Reactions    Sulfa (sulfonamide antibiotics) Swelling        Family History     Problem Relation (Age of Onset)    Brain cancer Maternal Grandmother    Diabetes Mother, Paternal Grandfather    Irritable bowel syndrome Father    Miscarriages / Stillbirths Paternal Grandmother        Tobacco Use    Smoking status: Never Smoker    Smokeless tobacco: Never Used   Substance and Sexual Activity    Alcohol use: Not Currently     Frequency: 2-3 times a week     Drinks per session: 3 or 4    Drug use: No    Sexual activity: Yes     Partners: Male     Birth control/protection: None     Review of Systems   Constitutional: Negative for chills and fever.   Eyes: Negative for visual disturbance.   Respiratory: Negative for shortness of breath.    Cardiovascular: Negative for chest pain and palpitations.   Gastrointestinal: Negative for abdominal pain, constipation, diarrhea, nausea and vomiting.   Genitourinary: Positive for vaginal discharge. Negative for dysuria, pelvic pain and vaginal bleeding.   Musculoskeletal: Negative for back pain.   Integumentary:  Negative for rash.   Neurological: Negative for seizures, syncope and headaches.   Hematological: Does not bruise/bleed easily.   Psychiatric/Behavioral: Negative for depression. The patient is not nervous/anxious.       Objective:     Vital Signs (Most Recent):  Temp: 98 °F (36.7 °C) (02/10/20 1843)  Pulse: 84 (02/10/20 1843)  Resp: 16 (02/10/20 1843)  BP: (!) 137/90 (02/10/20 1843)  SpO2: 97 % (02/10/20 1843) Vital Signs (24h Range):  Temp:  [98 °F (36.7 °C)] 98 °F (36.7 °C)  Pulse:  [84] 84  Resp:  [16] 16  SpO2:  [97 %] 97 %  BP: (137)/(90) 137/90        There is no height or weight on file to calculate BMI.    FHT: 130 bpm, moderate variability, - accels, no decels Cat 1 (reassuring)  TOCO: No contractions    Physical Exam:   Constitutional: She is oriented to person, place, and time. She appears well-developed and well-nourished. No distress.    HENT:    Head: Normocephalic and atraumatic.   Nose: No epistaxis.    Eyes: Conjunctivae and EOM are normal.    Neck: Normal range of motion. Neck supple. No thyromegaly present.    Cardiovascular: Normal rate and regular rhythm.     Pulmonary/Chest: Effort normal. No respiratory distress.        Abdominal: Soft. She exhibits no distension. There is no tenderness.             Musculoskeletal: Normal range of motion and moves all extremeties. She exhibits no edema or tenderness.       Neurological: She is alert and oriented to person, place, and time.    Skin: Skin is warm and dry. No rash noted.    Psychiatric: She has a normal mood and affect. Her behavior is normal.       SSE: Grossly ruptured  SVE: Fingertip     Significant Labs:  Lab Results   Component Value Date    GROUPTRH O POS 09/17/2019    HEPBSAG Negative 09/17/2019       I have personallly reviewed all pertinent lab results from the last 24 hours.

## 2020-02-11 NOTE — ASSESSMENT & PLAN NOTE
-- One elevated diastolic blood pressure at a clinic visit on 2/5. Diastolic blood pressure 90 in the OB ED.  -- Patient now meets criteria for gestational hypertension. Asymptomatic.  -- CBC, CMP within normal limits  -- P/C ratio pending  -- Will continue to closely monitor blood pressure

## 2020-02-11 NOTE — PLAN OF CARE
Plan of care reviewed with pt and family. Pt expressed concerns of being hospitalized and was reassured she will be informed and involved in all of her healthcare decisions at all times. Pt remains free from falls or injuries. Pt denies any painful ctx, or vaginal bleeding. Pt still reports leaking clear fluid. Pt reports positive fetal movement. Vital signs stable. No distress noted. Will continue to monitor.

## 2020-02-11 NOTE — ED PROVIDER NOTES
Encounter Date: 2/10/2020       History     Chief Complaint   Patient presents with    Rupture of Membranes     Marisela Siddiqui is a 44 y.o.  who presents for leakage of fluid. She states that around 6 pm today she experienced a large gush of clear fluid with continued leakage. No contractions, vaginal bleeding. Reports good fetal movement. 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        Review of patient's allergies indicates:   Allergen Reactions    Sulfa (sulfonamide antibiotics) Swelling     Past Medical History:   Diagnosis Date    Miscarriage     Miscarriage within last 12 months      Past Surgical History:   Procedure Laterality Date    ANTERIOR CRUCIATE LIGAMENT REPAIR Right 2012    DILATION AND CURETTAGE OF UTERUS      DILATION AND EVACUATION  2018    oocyte retrievals      SPINAL FUSION  2013    thoracic? due to burst fracture of vertebrae     Family History   Problem Relation Age of Onset    Miscarriages / Stillbirths Paternal Grandmother     Diabetes Mother     Irritable bowel syndrome Father         possible Crohns    Brain cancer Maternal Grandmother     Diabetes Paternal Grandfather     Breast cancer Neg Hx     Colon cancer Neg Hx     Eclampsia Neg Hx     Stroke Neg Hx     Hypertension Neg Hx     Ovarian cancer Neg Hx      Social History     Tobacco Use    Smoking status: Never Smoker    Smokeless tobacco: Never Used   Substance Use Topics    Alcohol use: Not Currently     Frequency: 2-3 times a week     Drinks per session: 3 or 4    Drug use: No     Review of Systems   Constitutional: Negative for activity change, appetite change, chills and fever.   Eyes: Negative for photophobia and visual disturbance.   Respiratory: Negative for chest tightness and shortness of breath.    Cardiovascular: Negative for chest pain.   Gastrointestinal: Negative for abdominal pain,  constipation, diarrhea, nausea and vomiting.   Genitourinary: Positive for vaginal discharge. Negative for dysuria, flank pain, pelvic pain and vaginal bleeding.   Musculoskeletal: Negative for back pain.   Skin: Negative for rash.   Neurological: Negative for dizziness, light-headedness and headaches.   Psychiatric/Behavioral: Negative for dysphoric mood, self-injury and suicidal ideas.       Physical Exam     Initial Vitals [02/10/20 1843]   BP Pulse Resp Temp SpO2   (!) 137/90 84 16 98 °F (36.7 °C) 97 %      MAP       --         Physical Exam    Vitals reviewed.  Constitutional: She appears well-developed and well-nourished. She is not diaphoretic. No distress.   HENT:   Head: Normocephalic and atraumatic.   Nose: Nose normal.   Eyes: Conjunctivae are normal.   Neck: Normal range of motion.   Cardiovascular: Normal rate.   Pulmonary/Chest: No respiratory distress.   Abdominal: Soft. She exhibits no distension. There is no tenderness. There is no rebound and no guarding.   Gravid   Genitourinary: Uterus normal.   Musculoskeletal: She exhibits no edema or tenderness.   Neurological: She is alert and oriented to person, place, and time.   Skin: Skin is warm and dry.   Psychiatric: She has a normal mood and affect. Her behavior is normal. Judgment and thought content normal.     OB LABOR EXAM:   Pre-Term Labor: No.   Membranes ruptured: Yes.   Method: Sterile speculum exam per MD.   Vaginal Bleeding: none present.     Dilatation: 0.       Amniotic Fluid Color: clear.   Amniotic Fluid Amount: copious.   Comments: Grossly ruptured.       ED Course   Fetal non-stress test  Date/Time: 2/10/2020 7:18 PM  Performed by: Jennifer Verduzco MD  Authorized by: Deanna Perry MD     Nonstress Test:     Variability:  6-25 BPM    Decelerations:  None    Accelerations:  10 bpm    Baseline:  135    Uterine Irritability: No      Contractions:  Not present  Biophysical Profile:     Nonstress Test Interpretation: reactive       Overall Impression:  Reassuring      Labs Reviewed - No data to display       Imaging Results    None          Medical Decision Making:   ED Management:  VSS. NST reactive and reassuring. Grossly ruptured, cervix fingertip. No contractions. Will admit to antepartum unit. BMZ and PPROM antibiotics.               Attending Attestation:   Physician Attestation Statement for Resident:  As the supervising MD   Physician Attestation Statement: I have personally seen and examined this patient.   I agree with the above history. -:   As the supervising MD I agree with the above PE.    As the supervising MD I agree with the above treatment, course, plan, and disposition.   -: Patient evaluated and found to be stable, agree with resident's assessment and plan.  I was personally present during the critical portions of the procedure(s) performed by the resident and was immediately available in the ED to provide services and assistance as needed during the entire procedure.  I have reviewed and agree with the residents interpretation of the following: lab data.  I have reviewed the following: old records at this facility.                    ED Course as of Feb 10 1919   Mon Feb 10, 2020   1918 NST reassuring, no contractions    [AR]      ED Course User Index  [AR] Lydia Raman MD                Clinical Impression:       ICD-10-CM ICD-9-CM   1. 32 weeks gestation of pregnancy Z3A.32 V22.2   2.  premature rupture of membranes (PPROM) with onset of labor after 24 hours of rupture in third trimester, antepartum O42.113 658.23         Disposition:   Disposition: Admitted  Condition: Stable                     Jennifer Verduzco MD  Resident  02/10/20 1920       Lydia Raman MD  02/10/20 1945

## 2020-02-11 NOTE — PROGRESS NOTES
02/11/20 3206   [facility] Note - Strip   Strip Reviewed by [facility] Nurse? Yes   [facility] Note - Communication   [place] Nurse Communicated with Bedside Nurse Regarding: Other (See Note)  (pulse ox, nurse will adjust reading)   [facility] Note - Notification   Nurse Notified? Yes   Name of Nurse allison

## 2020-02-11 NOTE — SUBJECTIVE & OBJECTIVE
Obstetric HPI:  Patient reports some intermittent cramping but denies frequent ctx,  active fetal movement, absent vaginal bleeding , continued LOF     Objective:     Vital Signs (Most Recent):  Temp: 98.1 °F (36.7 °C) (02/11/20 0550)  Pulse: 91 (02/11/20 0558)  Resp: 16 (02/11/20 0550)  BP: 119/65 (02/11/20 0550)  SpO2: 98 % (02/11/20 0558) Vital Signs (24h Range):  Temp:  [97.9 °F (36.6 °C)-98.4 °F (36.9 °C)] 98.1 °F (36.7 °C)  Pulse:  [79-97] 91  Resp:  [16] 16  SpO2:  [95 %-98 %] 98 %  BP: (119-153)/(65-90) 119/65        There is no height or weight on file to calculate BMI.    FHT: 125 Cat 1 (reassuring)  TOCO:  none    No intake or output data in the 24 hours ending 02/11/20 0705    Cervical Exam: FT/th on admission     Significant Labs:  Recent Lab Results       02/11/20  0051   02/10/20  1930        Albumin   2.7     Alkaline Phosphatase   101     ALT   15     Anion Gap   11     AST   27     Baso #   0.04     Basophil%   0.3     BILIRUBIN TOTAL   0.5  Comment:  For infants and newborns, interpretation of results should be based  on gestational age, weight and in agreement with clinical  observations.  Premature Infant recommended reference ranges:  Up to 24 hours.............<8.0 mg/dL  Up to 48 hours............<12.0 mg/dL  3-5 days..................<15.0 mg/dL  6-29 days.................<15.0 mg/dL       BUN, Bld   12     Calcium   9.3     Chloride   109     CO2   19     Creatinine   0.7     Creatinine, Random Ur 72.3  Comment:  The random urine reference ranges provided were established   for 24 hour urine collections.  No reference ranges exist for  random urine specimens.  Correlate clinically.         Differential Method   Automated     eGFR if    >60     eGFR if non    >60  Comment:  Calculation used to obtain the estimated glomerular filtration  rate (eGFR) is the CKD-EPI equation.        Eos #   0.3     Eosinophil%   2.1     Glucose   73     Gran # (ANC)   9.8      Gran%   71.8     Group & Rh   O POS     Hematocrit   39.1     Hemoglobin   12.9     Immature Grans (Abs)   0.18  Comment:  Mild elevation in immature granulocytes is non specific and   can be seen in a variety of conditions including stress response,   acute inflammation, trauma and pregnancy. Correlation with other   laboratory and clinical findings is essential.       Immature Granulocytes   1.3     INDIRECT TIMI   NEG     Lymph #   2.2     Lymph%   16.4     MCH   27.9     MCHC   33.0     MCV   85     Mono #   1.1     Mono%   8.1     MPV   10.7     nRBC   0     Platelets   242     Potassium   3.8  Comment:  Specimen slightly hemolyzed     Prot/Creat Ratio, Ur 0.46       PROTEIN TOTAL   6.6     Protein, Urine Random 33  Comment:  The random urine reference ranges provided were established   for 24 hour urine collections.  No reference ranges exist for  random urine specimens.  Correlate clinically.         RBC   4.62     RDW   12.5     Sodium   139     WBC   13.60           Physical Exam:   Constitutional: She is oriented to person, place, and time. She appears well-developed and well-nourished. No distress.    HENT:   Head: Normocephalic and atraumatic.    Eyes: Conjunctivae and EOM are normal.    Neck: Normal range of motion. Neck supple. No thyromegaly present.    Cardiovascular: Normal rate and regular rhythm.     Pulmonary/Chest: Effort normal. No respiratory distress.        Abdominal: Soft. She exhibits no distension. There is no tenderness.             Musculoskeletal: Normal range of motion and moves all extremeties. She exhibits no edema or tenderness.       Neurological: She is alert and oriented to person, place, and time.    Skin: Skin is warm and dry. No rash noted.    Psychiatric: She has a normal mood and affect. Her behavior is normal.

## 2020-02-11 NOTE — ASSESSMENT & PLAN NOTE
-- Admit to antepartum unit. No s/s of infection currently. No contractions. Cervix fingertip.  -- BMZ for fetal lung maturity  -- PPROM antibiotics per protocol   - Azithromycin 1 g PO today, and again on day #5   - Ampicillin 2 g IV q 6 hours for 48 hours   - Amoxicillin 500 mg PO BID days #3-10  -- Continuous fetal monitoring overnight  -- Consents for delivery signed.   -- Fetus is vertex as of 2/10  -- Most recent growth scan on 2/5 with EFW in the 41%

## 2020-02-11 NOTE — ED NOTES
Pt presents to [facility] with c/o LOF beginning at 6 pm. Pt reports +FM. Pt denies ctx and vag bleeding. Pt placed in assessment room 2.  notified.

## 2020-02-11 NOTE — ANESTHESIA PREPROCEDURE EVALUATION
Marisela Dial is a 44 y.o.  who presents for PPROM. No contractions, vaginal bleeding. Reports good fetal movement. 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    Patient has a history of spinal fusion although she states she does not know the levels. She claims it was a 2 level surgery that took place in [facility]  with a Dr. Jon in neurosurgery. Upon back examination, appears to have a surgical scar over L1 to L5.    20: Records obtained via patient.     XRAY LUMBOSACRAL SPINE AP AND  LATERAL     Status: Final result   Connect: Released on 2014 9:17 AM     LUMBOSACRAL SPINE AP AND LAT  Ordered: 2013 10:50 AM PATIENT NAME: MARISELA DIAL  Location: [id number] MRN: [id number]   Age: 37 yrs Sex: F  Adm M.D.: BAYRON JON MD : 1975  Exam Date: Accession #: Exam Code: Order MD:  2013 *[id number] BRII LINARES MD, PHD  Clinical statement: Status post T12-L2 fusion.  Technique: 2 views of the lumbosacral spine.  Comparison: X-ray of the lumbosacral spine dated April 15, 2013  Findings:  Status post T12-L2 posterior fusion with paired, vertical rods and  bilateral pedicle screws at T12 and L2. No evidence of hardware fracture  or malalignment. Interval improvement in moderate compression deformity  of the L1 vertebral body with decreased retropulsion posteriorly. There  is mild disc space height loss at T12-L1. The spinal alignment is  maintained without evidence of spondylolisthesis. No acute fracture is  identified. The SI joints are unremarkable.  Impression:  1. Status post T12-L2 posterior fusion with satisfactory postoperative  appearance.  2. Interval improvement in moderate compression deformity of L1  vertebral body decreased posterior retropulsion fracture fragments.   Prepared By: Eryn Winkler MD   Study interpreted and report approved by: Jarrett  Luis Armando SOLOMON   Electronically signed Diagnostic Report Imaging Report   2013 10:37 AM - 2013 03:49 PM    MRI LUMBAR SPINE WO CONTRAST    Status: Final result   Connect: Released on 2014 9:17 AM     MRI LUMBAR SPINE WO CONTRAST  Ordered: 2014 03:30 PM PATIENT NAME: RINKU DIAL  Location: 55R MRN: [id number]   Age: 38 yrs Sex: F  Adm M.D.: MORGAN CROWDER MD : 1975  Exam Date: Accession #: Exam Code: Order MD:  2014 *[id number] MRLSPWO MORGAN CROWDER MD  Clinical History: Back pain, also evaluate right iliac soft tissue  lipoma  Technique: MRI of the Lumbar Spine: A noncontrast study was performed  with sagittal T1, STIR, T2 and axial T2 sequences.  Comparison: Prior lumbar plain films dated 2013  Findings: There is a chronic compression fracture of the L1 vertebral  body with a mild posterior superior corner retropulsion without cord  compression. There has been a fusion above and below this level with  rods and pedicle screws, presumably this is a posttraumatic compression  fracture. There has been no interval change compared to the 2013 film.   The vertebral bodies have otherwise maintained their normal height and  signal. The disc spaces are intact and have maintained their normal  volume and signal. There is no evidence of canal or foraminal stenosis  from L1-2 through to the L5-S1 level. There is early bilateral facet  joint osteoarthropathy at L4-5 and at L5-S1 again without canal or  foraminal stenosis.  The conus medullaris and cauda equina are of normal size and signal.  There is a partially visualized lipoma over the right lower back and the  iliac crest.  Impression: Stable compression fracture of L1 with a posterior fusion  from T12-L2.  No evidence of degenerative disc disease, no canal or foraminal  stenosis.   Partially visualized right lower back lipoma.        OB History    Para Term  AB Living   5 0 0   4 0   SAB TAB  Ectopic Multiple Live Births     1            # Outcome Date GA Lbr Alfredo/2nd Weight Sex Delivery Anes PTL Lv   5 Current            4 TAB 2018     TAB         Birth Comments: Trisomy 18   3 AB            2 AB            1 AB                Wt Readings from Last 1 Encounters:   20 1147 83.6 kg (184 lb 4.9 oz)       BP Readings from Last 3 Encounters:   02/10/20 (!) 137/90   20 126/80   20 124/86       Patient Active Problem List   Diagnosis    Seborrheic dermatitis of scalp    Post partum depression    Recurrent oral herpes simplex    Attention deficit hyperactivity disorder (ADHD)    Vanishing twin syndrome     premature rupture of membranes (PPROM)    History of poor pregnancy outcome (T18 x2)       Past Surgical History:   Procedure Laterality Date    ANTERIOR CRUCIATE LIGAMENT REPAIR Right 2012    DILATION AND CURETTAGE OF UTERUS      DILATION AND EVACUATION  2018    oocyte retrievals      SPINAL FUSION  2013    thoracic? due to burst fracture of vertebrae       Social History     Socioeconomic History    Marital status:      Spouse name: Not on file    Number of children: Not on file    Years of education: Not on file    Highest education level: Not on file   Occupational History     Comment: Microbiologist,     Social Needs    Financial resource strain: Not on file    Food insecurity:     Worry: Not on file     Inability: Not on file    Transportation needs:     Medical: Not on file     Non-medical: Not on file   Tobacco Use    Smoking status: Never Smoker    Smokeless tobacco: Never Used   Substance and Sexual Activity    Alcohol use: Not Currently     Frequency: 2-3 times a week     Drinks per session: 3 or 4    Drug use: No    Sexual activity: Yes     Partners: Male     Birth control/protection: None   Lifestyle    Physical activity:     Days per week: Not on file     Minutes per session: Not on file    Stress: Not on file    Relationships    Social connections:     Talks on phone: Not on file     Gets together: Not on file     Attends Taoist service: Not on file     Active member of club or organization: Not on file     Attends meetings of clubs or organizations: Not on file     Relationship status: Not on file   Other Topics Concern    Not on file   Social History Narrative    Not on file         Chemistry        Component Value Date/Time     09/17/2019 1421    K 3.6 09/17/2019 1421     09/17/2019 1421    CO2 20 (L) 09/17/2019 1421    BUN 11 09/17/2019 1421    CREATININE 0.6 09/17/2019 1421    GLU 72 09/17/2019 1421        Component Value Date/Time    CALCIUM 9.5 09/17/2019 1421    ALKPHOS 61 11/11/2018 0838    AST 20 11/11/2018 0838    ALT 20 11/11/2018 0838    BILITOT 1.2 (H) 11/11/2018 0838    ESTGFRAFRICA >60 09/17/2019 1421    EGFRNONAA >60 09/17/2019 1421            Lab Results   Component Value Date    WBC 13.60 (H) 02/10/2020    HGB 12.9 02/10/2020    HCT 39.1 02/10/2020    MCV 85 02/10/2020     02/10/2020       No results for input(s): PT, INR, PROTIME, APTT in the last 72 hours.                Anesthesia Evaluation    I have reviewed the Patient Summary Reports.     I have reviewed the Medications.     Review of Systems  Anesthesia Hx:  No problems with previous Anesthesia  History of prior surgery of interest to airway management or planning:  Denies Personal Hx of Anesthesia complications.   Hematology/Oncology:  Hematology Normal   Oncology Normal     EENT/Dental:EENT/Dental Normal   Cardiovascular:  Cardiovascular Normal     Pulmonary:  Pulmonary Normal    Renal/:  Renal/ Normal     Hepatic/GI:  Hepatic/GI Normal    Musculoskeletal:  Spine Disorders: lumbar    Neurological:  Neurology Normal    Endocrine:  Endocrine Normal        Physical Exam  General:  Well nourished    Airway/Jaw/Neck:  Airway Findings: Mouth Opening: Normal Tongue: Normal  General Airway Assessment: Adult  Mallampati: II   TM Distance: 4 - 6 cm  Jaw/Neck Findings:  Neck ROM: Normal ROM     Eyes/Ears/Nose:  EYES/EARS/NOSE FINDINGS: Normal   Dental:  Dental Findings: In tact   Chest/Lungs:  Chest/Lungs Clear    Heart/Vascular:  Heart Findings: Normal     Musculoskeletal:  Musculoskeletal Findings: (Surgical scar over L1-L5)     Mental Status:  Mental Status Findings:  Cooperative, Alert and Oriented         Anesthesia Plan  Type of Anesthesia, risks & benefits discussed:  Anesthesia Type:  CSE, epidural, general, spinal  Patient's Preference:   Intra-op Monitoring Plan: standard ASA monitors  Intra-op Monitoring Plan Comments:   Post Op Pain Control Plan: multimodal analgesia, epidural analgesia and intrathecal opioid  Post Op Pain Control Plan Comments:   Induction:   IV  Beta Blocker:         Informed Consent: Patient understands risks and agrees with Anesthesia plan.  Questions answered. Anesthesia consent signed with patient.  ASA Score: 2     Day of Surgery Review of History & Physical:    H&P update referred to the surgeon.         Ready For Surgery From Anesthesia Perspective.

## 2020-02-12 PROCEDURE — 59025 PR FETAL 2N-STRESS TEST: ICD-10-PCS | Mod: 26,,, | Performed by: OBSTETRICS & GYNECOLOGY

## 2020-02-12 PROCEDURE — 99231 SBSQ HOSP IP/OBS SF/LOW 25: CPT | Mod: 25,,, | Performed by: OBSTETRICS & GYNECOLOGY

## 2020-02-12 PROCEDURE — 11000001 HC ACUTE MED/SURG PRIVATE ROOM

## 2020-02-12 PROCEDURE — 99231 PR SUBSEQUENT HOSPITAL CARE,LEVL I: ICD-10-PCS | Mod: 25,,, | Performed by: OBSTETRICS & GYNECOLOGY

## 2020-02-12 PROCEDURE — 25000003 PHARM REV CODE 250: Performed by: STUDENT IN AN ORGANIZED HEALTH CARE EDUCATION/TRAINING PROGRAM

## 2020-02-12 PROCEDURE — 59025 FETAL NON-STRESS TEST: CPT | Mod: 26,,, | Performed by: OBSTETRICS & GYNECOLOGY

## 2020-02-12 PROCEDURE — 90715 TDAP VACCINE 7 YRS/> IM: CPT | Performed by: STUDENT IN AN ORGANIZED HEALTH CARE EDUCATION/TRAINING PROGRAM

## 2020-02-12 PROCEDURE — 90471 IMMUNIZATION ADMIN: CPT | Performed by: STUDENT IN AN ORGANIZED HEALTH CARE EDUCATION/TRAINING PROGRAM

## 2020-02-12 PROCEDURE — 63600175 PHARM REV CODE 636 W HCPCS: Performed by: STUDENT IN AN ORGANIZED HEALTH CARE EDUCATION/TRAINING PROGRAM

## 2020-02-12 RX ORDER — AMOXICILLIN 250 MG/1
500 CAPSULE ORAL EVERY 8 HOURS
Status: COMPLETED | OUTPATIENT
Start: 2020-02-13 | End: 2020-02-17

## 2020-02-12 RX ADMIN — AMPICILLIN SODIUM 2 G: 2 INJECTION, POWDER, FOR SOLUTION INTRAMUSCULAR; INTRAVENOUS at 09:02

## 2020-02-12 RX ADMIN — AMPICILLIN SODIUM 2 G: 2 INJECTION, POWDER, FOR SOLUTION INTRAMUSCULAR; INTRAVENOUS at 03:02

## 2020-02-12 RX ADMIN — PRENATAL VIT W/ FE FUMARATE-FA TAB 27-0.8 MG 1 TABLET: 27-0.8 TAB at 09:02

## 2020-02-12 RX ADMIN — CLOSTRIDIUM TETANI TOXOID ANTIGEN (FORMALDEHYDE INACTIVATED), CORYNEBACTERIUM DIPHTHERIAE TOXOID ANTIGEN (FORMALDEHYDE INACTIVATED), BORDETELLA PERTUSSIS TOXOID ANTIGEN (GLUTARALDEHYDE INACTIVATED), BORDETELLA PERTUSSIS FILAMENTOUS HEMAGGLUTININ ANTIGEN (FORMALDEHYDE INACTIVATED), BORDETELLA PERTUSSIS PERTACTIN ANTIGEN, AND BORDETELLA PERTUSSIS FIMBRIAE 2/3 ANTIGEN 0.5 ML: 5; 2; 2.5; 5; 3; 5 INJECTION, SUSPENSION INTRAMUSCULAR at 03:02

## 2020-02-12 NOTE — SUBJECTIVE & OBJECTIVE
Obstetric HPI:  Patient reports some intermittent cramping but denies frequent ctx,  active fetal movement, absent vaginal bleeding , continued LOF     Objective:     Vital Signs (Most Recent):  Temp: 98 °F (36.7 °C) (02/12/20 0304)  Pulse: 78 (02/12/20 0304)  Resp: 16 (02/12/20 0304)  BP: 110/62 (02/12/20 0304)  SpO2: 95 % (02/11/20 2208) Vital Signs (24h Range):  Temp:  [97.5 °F (36.4 °C)-99.3 °F (37.4 °C)] 98 °F (36.7 °C)  Pulse:  [] 78  Resp:  [16-18] 16  SpO2:  [95 %-98 %] 95 %  BP: (110-149)/(62-85) 110/62        There is no height or weight on file to calculate BMI.    FHT: 125 Cat 1 (reassuring)  TOCO:  none      Intake/Output Summary (Last 24 hours) at 2/12/2020 0636  Last data filed at 2/11/2020 1800  Gross per 24 hour   Intake 1000 ml   Output 1400 ml   Net -400 ml       Cervical Exam: FT/th on admission     Significant Labs:  Recent Lab Results       02/11/20  0718        Creatinine, Random Ur 44.9  Comment:  The random urine reference ranges provided were established   for 24 hour urine collections.  No reference ranges exist for  random urine specimens.  Correlate clinically.       Prot/Creat Ratio, Ur Unable to calculate     Protein, Urine Random <7  Comment:  The random urine reference ranges provided were established   for 24 hour urine collections.  No reference ranges exist for  random urine specimens.  Correlate clinically.             Physical Exam:   Constitutional: She is oriented to person, place, and time. She appears well-developed and well-nourished. No distress.    HENT:   Head: Normocephalic and atraumatic.    Eyes: Conjunctivae and EOM are normal.    Neck: Normal range of motion. Neck supple. No thyromegaly present.    Cardiovascular: Normal rate and regular rhythm.     Pulmonary/Chest: Effort normal. No respiratory distress.        Abdominal: Soft. She exhibits no distension. There is no tenderness.   Gravid, NT             Musculoskeletal: Normal range of motion and moves all  extremeties. She exhibits no edema or tenderness.       Neurological: She is alert and oriented to person, place, and time.    Skin: Skin is warm and dry. No rash noted.    Psychiatric: She has a normal mood and affect. Her behavior is normal.

## 2020-02-12 NOTE — ASSESSMENT & PLAN NOTE
-- diagnosed on admit  -- pt asymptomatic this AM  -- BP: (113-142)/(59-88) 113/60  -- CBC, CMP within normal limits  -- cath P:C TLTC  -- Will continue to closely monitor blood pressure

## 2020-02-12 NOTE — ASSESSMENT & PLAN NOTE
-- cervix FT/th on admission  -- s/p BMZ for fetal lung maturity  -- PPROM antibiotics D#2   - Azithromycin 1 g PO today, and again on day #5   - Ampicillin 2 g IV q 6 hours for 48 hours   - Amoxicillin 500 mg PO BID days #3-10  -- NST BID  -- Fetus is vertex as of 2/10  -- Most recent growth scan on 2/5 with EFW in the 41%

## 2020-02-12 NOTE — PLAN OF CARE
02/12/20 1606   Discharge Assessment   Assessment Type Discharge Planning Assessment   Confirmed/corrected address and phone number on facesheet? Yes   Assessment information obtained from? Patient   Prior to hospitilization cognitive status: Alert/Oriented   Prior to hospitalization functional status: Independent   Current cognitive status: Alert/Oriented   Current Functional Status: Independent   Lives With spouse   Able to Return to Prior Arrangements yes   Equipment Currently Used at Home none   Do you have any problems affording any of your prescribed medications? No   Does the patient have transportation home? Yes   Transportation Anticipated family or friend will provide   Discharge Plan A Home     Introduced self to pt and explained sw role. No anticipated d/c needs at this time. Should any d/c needs arise, please consult social work. Emotional support provided.    Pt has an emotional support dog named Jesus that is allowed to be in the hospital.    Plan  Weekly f/u visits  Prn visits for emotional support  NICU tour Courtney Lafont, LCSW [facility] [place]  [email or web address]    (phone) [phone] or  [place]. [phone]  (fax) [phone] 1

## 2020-02-12 NOTE — PROGRESS NOTES
[facility]  Obstetrics  Antepartum Progress Note    Patient Name: Marisela Siddiqui  MRN: [id number]  Admission Date: 2/10/2020  Hospital Length of Stay: 2 days  Attending Physician: Siobhan Burch MD  Primary Care Provider: Ashlyn Mayorga MD    Subjective:     Principal Problem: premature rupture of membranes (PPROM) with unknown onset of labor    HPI:  Marisela Siddiqui is a 44 y.o.  who presents for leakage of fluid. She states that around 6 pm today she experienced a large gush of clear fluid with continued leakage. No contractions, vaginal bleeding. Reports good fetal movement. 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    Hospital Course:  2020: admitted for PPROM, abx initiated  2020 - NAEON, fetal tracing reassuring  2020 - PPROM abx D#2. NAEON. FHT overall reassuring throughout the day yesterday. Pt switched to NST BID.     Obstetric HPI:  Patient reports some intermittent cramping but denies frequent ctx,  active fetal movement, absent vaginal bleeding , continued LOF. Denies HA, changes in vision, SOB, CP, RUQ pain     Objective:     Vital Signs (Most Recent):  Temp: 98 °F (36.7 °C) (20 0304)  Pulse: 78 (20 0304)  Resp: 16 (20 0304)  BP: 110/62 (20 0304)  SpO2: 95 % (20 2208) Vital Signs (24h Range):  Temp:  [97.5 °F (36.4 °C)-99.3 °F (37.4 °C)] 98 °F (36.7 °C)  Pulse:  [] 78  Resp:  [16-18] 16  SpO2:  [95 %-98 %] 95 %  BP: (110-149)/(62-85) 110/62        There is no height or weight on file to calculate BMI.    FHT: 125 Cat 1 (reassuring)  TOCO:  none      Intake/Output Summary (Last 24 hours) at 2020 0636  Last data filed at 2020 1800  Gross per 24 hour   Intake 1000 ml   Output 1400 ml   Net -400 ml       Cervical Exam: FT/th on admission     Significant Labs:  Recent Lab Results       20  0718         Creatinine, Random Ur 44.9  Comment:  The random urine reference ranges provided were established   for 24 hour urine collections.  No reference ranges exist for  random urine specimens.  Correlate clinically.       Prot/Creat Ratio, Ur Unable to calculate     Protein, Urine Random <7  Comment:  The random urine reference ranges provided were established   for 24 hour urine collections.  No reference ranges exist for  random urine specimens.  Correlate clinically.             Physical Exam:   Constitutional: She is oriented to person, place, and time. She appears well-developed and well-nourished. No distress.    HENT:   Head: Normocephalic and atraumatic.    Eyes: Conjunctivae and EOM are normal.    Neck: Normal range of motion. Neck supple. No thyromegaly present.    Cardiovascular: Normal rate and regular rhythm.     Pulmonary/Chest: Effort normal. No respiratory distress.        Abdominal: Soft. She exhibits no distension. There is no tenderness.   Gravid, NT             Musculoskeletal: Normal range of motion and moves all extremeties. She exhibits no edema or tenderness.       Neurological: She is alert and oriented to person, place, and time.    Skin: Skin is warm and dry. No rash noted.    Psychiatric: She has a normal mood and affect. Her behavior is normal.     Assessment/Plan:     44 y.o. female  at 32w2d for:    *  premature rupture of membranes (PPROM)  -- cervix FT/th on admission  -- s/p BMZ for fetal lung maturity  -- PPROM antibiotics D#2   - Azithromycin 1 g PO today, and again on day #5   - Ampicillin 2 g IV q 6 hours for 48 hours   - Amoxicillin 500 mg PO BID days #3-10  -- NST BID  -- Fetus is vertex as of 2/10  -- Most recent growth scan on  with EFW in the 41%    Gestational hypertension, third trimester  -- diagnosed on admit  -- pt asymptomatic this AM  -- BP: (110-149)/(62-85) 110/62  -- CBC, CMP within normal limits  -- cath P:C TLTC  -- Will continue to closely monitor  blood pressure    Post partum depression  -- Diagnosed after having a 12 week miscarriage  -- Patient also has history of 2 trisomy 18 pregnancies  -- No current medications  -- Will need 1-2 week mood check on discharge          Jackie Kim MD  Obstetrics  [facility]

## 2020-02-13 PROCEDURE — 59025 PR FETAL 2N-STRESS TEST: ICD-10-PCS | Mod: 26,59,, | Performed by: OBSTETRICS & GYNECOLOGY

## 2020-02-13 PROCEDURE — 59025 FETAL NON-STRESS TEST: CPT | Mod: 26,59,, | Performed by: OBSTETRICS & GYNECOLOGY

## 2020-02-13 PROCEDURE — 99231 SBSQ HOSP IP/OBS SF/LOW 25: CPT | Mod: 25,,, | Performed by: OBSTETRICS & GYNECOLOGY

## 2020-02-13 PROCEDURE — 99231 PR SUBSEQUENT HOSPITAL CARE,LEVL I: ICD-10-PCS | Mod: 25,,, | Performed by: OBSTETRICS & GYNECOLOGY

## 2020-02-13 PROCEDURE — 25000003 PHARM REV CODE 250: Performed by: STUDENT IN AN ORGANIZED HEALTH CARE EDUCATION/TRAINING PROGRAM

## 2020-02-13 PROCEDURE — 11000001 HC ACUTE MED/SURG PRIVATE ROOM

## 2020-02-13 RX ADMIN — AMOXICILLIN 500 MG: 250 CAPSULE ORAL at 06:02

## 2020-02-13 RX ADMIN — AMOXICILLIN 500 MG: 250 CAPSULE ORAL at 02:02

## 2020-02-13 RX ADMIN — AMOXICILLIN 500 MG: 250 CAPSULE ORAL at 10:02

## 2020-02-13 NOTE — PLAN OF CARE
VSS, afebrile. No acute changes this shift. Denies ctx, vag bleeding. Reports +FM, no change in LOF. Pt seemed less anxious this shift, service dog and  visited. SCDs at bs, pt refused to wear. Rested well this shift. Call light ans personal belongings within reach. Plan of care reviewed with pt, all questions answered and pt verbalized understanding.

## 2020-02-13 NOTE — PROGRESS NOTES
VSS throughout shift and WDL.  No significant events during the shift. Pt remains free from fall, injury, and skin breakdown. Reports positive fetal movement; denies vaginal bleeding and contractions. Ambulates to restroom; output adequate. Pt denies pain. TEDs/SCDs at bedside, pt states she will wear them when she goes to sleep tonight. Plan of care reviewed with patient, all questions answered and verbalized understanding. Bed low and locked. Call light within reach. Purposeful rounding performed. Resting comfortably in bed, no other complaints at this time.

## 2020-02-13 NOTE — SUBJECTIVE & OBJECTIVE
Obstetric HPI:  Patient denies ctx,  active fetal movement, absent vaginal bleeding , continued LOF    Denies abdominal pain, fevers, n/v     Objective:     Vital Signs (Most Recent):  Temp: 98.7 °F (37.1 °C) (02/13/20 0400)  Pulse: 75 (02/13/20 0400)  Resp: 16 (02/13/20 0400)  BP: 113/60 (02/13/20 0400)  SpO2: (!) 93 % (02/13/20 0400) Vital Signs (24h Range):  Temp:  [96.3 °F (35.7 °C)-98.7 °F (37.1 °C)] 98.7 °F (37.1 °C)  Pulse:  [75-88] 75  Resp:  [16] 16  SpO2:  [92 %-98 %] 93 %  BP: (113-142)/(59-88) 113/60        There is no height or weight on file to calculate BMI.    FHT: 130 Cat 1 (reassuring)  TOCO:  none    No intake or output data in the 24 hours ending 02/13/20 0656    Cervical Exam: FT/th on admission     Significant Labs:  Recent Lab Results     None          Physical Exam:   Constitutional: She is oriented to person, place, and time. She appears well-developed and well-nourished. No distress.    HENT:   Head: Normocephalic and atraumatic.    Eyes: Conjunctivae and EOM are normal.    Neck: Normal range of motion. Neck supple. No thyromegaly present.    Cardiovascular: Normal rate and regular rhythm.     Pulmonary/Chest: Effort normal. No respiratory distress.        Abdominal: Soft. She exhibits no distension. There is no tenderness.   Gravid, NT             Musculoskeletal: Normal range of motion and moves all extremeties. She exhibits no edema or tenderness.       Neurological: She is alert and oriented to person, place, and time.    Skin: Skin is warm and dry. No rash noted.    Psychiatric: She has a normal mood and affect. Her behavior is normal.

## 2020-02-13 NOTE — PROGRESS NOTES
Patient declined back ultrasound due to states she has received her outside records; Dr Kim informed and anesthesia called

## 2020-02-13 NOTE — ASSESSMENT & PLAN NOTE
-- cervix FT/th on admission  -- s/p BMZ for fetal lung maturity  -- PPROM antibiotics D#3   - Azithromycin 1 g PO today, and again on day #5   - Ampicillin 2 g IV q 6 hours for 48 hours   - Amoxicillin 500 mg PO BID days #3-10  -- NST TID  -- Fetus is vertex as of 2/10  -- Most recent growth scan on 2/5 with EFW in the 41%, plan on repeat growth today

## 2020-02-13 NOTE — PROGRESS NOTES
[facility]  Obstetrics  Antepartum Progress Note    Patient Name: Marisela Siddiqui  MRN: [id number]  Admission Date: 2/10/2020  Hospital Length of Stay: 3 days  Attending Physician: Siobhan Burch MD  Primary Care Provider: Ashlyn Mayorga MD    Subjective:     Principal Problem: premature rupture of membranes (PPROM) with unknown onset of labor    HPI:  Marisela Siddiqui is a 44 y.o.  who presents for leakage of fluid. She states that around 6 pm today she experienced a large gush of clear fluid with continued leakage. No contractions, vaginal bleeding. Reports good fetal movement. 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    Hospital Course:  2020: admitted for PPROM, abx initiated  2020 - NAEON, fetal tracing reassuring  2020 - PPROM abx D#2. NAEON. FHT overall reassuring throughout the day yesterday. Pt switched to NST TID.   2020 - PPROM abx D#3. NAEON    Obstetric HPI:  Patient denies ctx,  active fetal movement, absent vaginal bleeding , continued LOF    Denies abdominal pain, fevers, n/v     Objective:     Vital Signs (Most Recent):  Temp: 98.7 °F (37.1 °C) (20 0400)  Pulse: 75 (20 0400)  Resp: 16 (20 0400)  BP: 113/60 (20 0400)  SpO2: (!) 93 % (20 0400) Vital Signs (24h Range):  Temp:  [96.3 °F (35.7 °C)-98.7 °F (37.1 °C)] 98.7 °F (37.1 °C)  Pulse:  [75-88] 75  Resp:  [16] 16  SpO2:  [92 %-98 %] 93 %  BP: (113-142)/(59-88) 113/60        There is no height or weight on file to calculate BMI.    FHT: 130 Cat 1 (reassuring)  TOCO:  none    No intake or output data in the 24 hours ending 20 0656    Cervical Exam:  on admission     Significant Labs:  Recent Lab Results     None          Physical Exam:   Constitutional: She is oriented to person, place, and time. She appears well-developed and well-nourished. No distress.     HENT:   Head: Normocephalic and atraumatic.    Eyes: Conjunctivae and EOM are normal.    Neck: Normal range of motion. Neck supple. No thyromegaly present.    Cardiovascular: Normal rate and regular rhythm.     Pulmonary/Chest: Effort normal. No respiratory distress.        Abdominal: Soft. She exhibits no distension. There is no tenderness.   Gravid, NT             Musculoskeletal: Normal range of motion and moves all extremeties. She exhibits no edema or tenderness.       Neurological: She is alert and oriented to person, place, and time.    Skin: Skin is warm and dry. No rash noted.    Psychiatric: She has a normal mood and affect. Her behavior is normal.       Assessment/Plan:     44 y.o. female  at 32w3d for:    *  premature rupture of membranes (PPROM)  -- cervix FT/th on admission  -- s/p BMZ for fetal lung maturity  -- PPROM antibiotics D#3   - Azithromycin 1 g PO today, and again on day #5   - Ampicillin 2 g IV q 6 hours for 48 hours   - Amoxicillin 500 mg PO BID days #3-10  -- NST TID  -- Fetus is vertex as of 2/10  -- Most recent growth scan on  with EFW in the 41%, plan on repeat growth today    Gestational hypertension, third trimester  -- diagnosed on admit  -- pt asymptomatic this AM  -- BP: (113-142)/(59-88) 113/60  -- CBC, CMP within normal limits  -- cath P:C TLTC  -- Will continue to closely monitor blood pressure    Post partum depression  -- Diagnosed after having a 12 week miscarriage  -- Patient also has history of 2 trisomy 18 pregnancies  -- No current medications  -- Will need 1-2 week mood check on discharge          Jackie Kim MD  Obstetrics  [facility]

## 2020-02-14 PROCEDURE — 59025 FETAL NON-STRESS TEST: CPT | Mod: 26,,, | Performed by: OBSTETRICS & GYNECOLOGY

## 2020-02-14 PROCEDURE — 99233 PR SUBSEQUENT HOSPITAL CARE,LEVL III: ICD-10-PCS | Mod: 25,,, | Performed by: OBSTETRICS & GYNECOLOGY

## 2020-02-14 PROCEDURE — 11000001 HC ACUTE MED/SURG PRIVATE ROOM

## 2020-02-14 PROCEDURE — 25000003 PHARM REV CODE 250: Performed by: STUDENT IN AN ORGANIZED HEALTH CARE EDUCATION/TRAINING PROGRAM

## 2020-02-14 PROCEDURE — 99233 SBSQ HOSP IP/OBS HIGH 50: CPT | Mod: 25,,, | Performed by: OBSTETRICS & GYNECOLOGY

## 2020-02-14 PROCEDURE — 59025 PR FETAL 2N-STRESS TEST: ICD-10-PCS | Mod: 26,,, | Performed by: OBSTETRICS & GYNECOLOGY

## 2020-02-14 RX ADMIN — AMOXICILLIN 500 MG: 250 CAPSULE ORAL at 02:02

## 2020-02-14 RX ADMIN — DIPHENHYDRAMINE HYDROCHLORIDE 25 MG: 25 CAPSULE ORAL at 09:02

## 2020-02-14 RX ADMIN — AMOXICILLIN 500 MG: 250 CAPSULE ORAL at 09:02

## 2020-02-14 RX ADMIN — AMOXICILLIN 500 MG: 250 CAPSULE ORAL at 06:02

## 2020-02-14 NOTE — PROGRESS NOTES
[facility]  Obstetrics  Antepartum Progress Note    Patient Name: Marisela Siddiqui  MRN: [id number]  Admission Date: 2/10/2020  Hospital Length of Stay: 4 days  Attending Physician: Siobhan Burch MD  Primary Care Provider: Ashlyn Mayorga MD    Subjective:     Principal Problem: premature rupture of membranes (PPROM) with unknown onset of labor    HPI:  Marisela Siddiqui is a 44 y.o.  who presents for leakage of fluid. She states that around 6 pm today she experienced a large gush of clear fluid with continued leakage. No contractions, vaginal bleeding. Reports good fetal movement. 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    Hospital Course:  2020: admitted for PPROM, abx initiated  2020 - NAEON, fetal tracing reassuring  2020 - PPROM abx D#2. NAEON. FHT overall reassuring throughout the day yesterday. Pt switched to NST TID.   2020 - PPROM abx D#3. NAEON  2020 - PPROM abx D#4. NAEON    Obstetric HPI:  Patient denies ctx,  active fetal movement, absent vaginal bleeding , continued LOF    Denies abdominal pain, fevers, n/v     Objective:     Vital Signs (Most Recent):  Temp: 98.5 °F (36.9 °C) (20)  Pulse: (!) 54 (20)  Resp: 16 (20)  BP: (!) 147/72 (20)  SpO2: 99 % (20) Vital Signs (24h Range):  Temp:  [97.6 °F (36.4 °C)-98.5 °F (36.9 °C)] 98.5 °F (36.9 °C)  Pulse:  [54-88] 54  Resp:  [16] 16  SpO2:  [95 %-99 %] 99 %  BP: (137-147)/(72-96) 147/72        There is no height or weight on file to calculate BMI.    FHT: 130 Cat 1 (reassuring), +accels, no decels  TOCO:  none    No intake or output data in the 24 hours ending 20 0707    Cervical Exam:  on admission     Significant Labs:  Recent Lab Results     None          Physical Exam:   Constitutional: She is oriented to person, place, and time.  She appears well-developed and well-nourished. No distress.    HENT:   Head: Normocephalic and atraumatic.    Eyes: Conjunctivae and EOM are normal.    Neck: Normal range of motion. Neck supple. No thyromegaly present.    Cardiovascular: Normal rate and regular rhythm.     Pulmonary/Chest: Effort normal. No respiratory distress.        Abdominal: Soft. She exhibits no distension. There is no tenderness.   Gravid, NT             Musculoskeletal: Normal range of motion and moves all extremeties. She exhibits no edema or tenderness.       Neurological: She is alert and oriented to person, place, and time.    Skin: Skin is warm and dry. No rash noted.    Psychiatric: She has a normal mood and affect. Her behavior is normal.       Assessment/Plan:     44 y.o. female  at 32w4d for:    *  premature rupture of membranes (PPROM)  -- cervix FT/th on admission  -- s/p BMZ for fetal lung maturity  -- PPROM antibiotics D#4   - Azithromycin 1 g PO today, and again on day #5   - Ampicillin 2 g IV q 6 hours for 48 hours   - Amoxicillin 500 mg PO BID days #3-10  -- NST TID  -- Fetus is vertex as of 2/10  -- Most recent growth scan on  with EFW in the 41%    Gestational hypertension, third trimester  -- diagnosed on admit  -- pt asymptomatic this AM  -- BP: (137-147)/(72-96) 147/72  -- CBC, CMP within normal limits  -- cath P:C TLTC  -- Will continue to closely monitor blood pressure    Post partum depression  -- Diagnosed after having a 12 week miscarriage  -- Patient also has history of 2 trisomy 18 pregnancies  -- No current medications  -- Will need 1-2 week mood check on discharge          Jackie Kim MD  Obstetrics  [facility]

## 2020-02-14 NOTE — PLAN OF CARE
Pt reports no acute changes or complaints throughout night. VS stable and afebrile. Pt reports + FM and denies vaginal bleeding or  contractions. Pt denies headache, blurry vision, or RUQ pain. Po antibiotics continued as ordered. NST reactive as reassuring. Continued to be performed TID as ordered. Plan of care reviewed with patient. All questions and concerns addressed at this time.

## 2020-02-14 NOTE — SUBJECTIVE & OBJECTIVE
Obstetric HPI:  Patient denies ctx,  active fetal movement, absent vaginal bleeding , continued LOF    Denies abdominal pain, fevers, n/v     Objective:     Vital Signs (Most Recent):  Temp: 98.5 °F (36.9 °C) (02/14/20 0604)  Pulse: (!) 54 (02/14/20 0604)  Resp: 16 (02/14/20 0604)  BP: (!) 147/72 (02/14/20 0604)  SpO2: 99 % (02/14/20 0604) Vital Signs (24h Range):  Temp:  [97.6 °F (36.4 °C)-98.5 °F (36.9 °C)] 98.5 °F (36.9 °C)  Pulse:  [54-88] 54  Resp:  [16] 16  SpO2:  [95 %-99 %] 99 %  BP: (137-147)/(72-96) 147/72        There is no height or weight on file to calculate BMI.    FHT: 130 Cat 1 (reassuring), +accels, no decels  TOCO:  none    No intake or output data in the 24 hours ending 02/14/20 0707    Cervical Exam: FT/th on admission     Significant Labs:  Recent Lab Results     None          Physical Exam:   Constitutional: She is oriented to person, place, and time. She appears well-developed and well-nourished. No distress.    HENT:   Head: Normocephalic and atraumatic.    Eyes: Conjunctivae and EOM are normal.    Neck: Normal range of motion. Neck supple. No thyromegaly present.    Cardiovascular: Normal rate and regular rhythm.     Pulmonary/Chest: Effort normal. No respiratory distress.        Abdominal: Soft. She exhibits no distension. There is no tenderness.   Gravid, NT             Musculoskeletal: Normal range of motion and moves all extremeties. She exhibits no edema or tenderness.       Neurological: She is alert and oriented to person, place, and time.    Skin: Skin is warm and dry. No rash noted.    Psychiatric: She has a normal mood and affect. Her behavior is normal.

## 2020-02-14 NOTE — ASSESSMENT & PLAN NOTE
-- cervix FT/th on admission  -- s/p BMZ for fetal lung maturity  -- PPROM antibiotics D#4   - Azithromycin 1 g PO today, and again on day #5   - Ampicillin 2 g IV q 6 hours for 48 hours   - Amoxicillin 500 mg PO BID days #3-10  -- NST TID  -- Fetus is vertex as of 2/10  -- Most recent growth scan on 2/5 with EFW in the 41%

## 2020-02-14 NOTE — ASSESSMENT & PLAN NOTE
-- diagnosed on admit  -- pt asymptomatic this AM  -- BP: (137-147)/(72-96) 147/72  -- CBC, CMP within normal limits  -- cath P:C TLTC  -- Will continue to closely monitor blood pressure

## 2020-02-14 NOTE — NURSING
Pt DND from [id number]. Pt instructed to notify nurse if she experiences any acute changes: decreased fetal movement, vaginal bleeding, contractions, headache, blurry vision, or RUQ pain. Pt verbalized understanding.

## 2020-02-14 NOTE — NURSING
NST performed per orders. Strip reviewed by Dr. Verduzco. Ok to remove EFM and toco at this time.

## 2020-02-15 PROCEDURE — 99233 PR SUBSEQUENT HOSPITAL CARE,LEVL III: ICD-10-PCS | Mod: 25,,, | Performed by: OBSTETRICS & GYNECOLOGY

## 2020-02-15 PROCEDURE — 59025 FETAL NON-STRESS TEST: CPT | Mod: 26,,, | Performed by: OBSTETRICS & GYNECOLOGY

## 2020-02-15 PROCEDURE — 25000003 PHARM REV CODE 250: Performed by: STUDENT IN AN ORGANIZED HEALTH CARE EDUCATION/TRAINING PROGRAM

## 2020-02-15 PROCEDURE — 59025 PR FETAL 2N-STRESS TEST: ICD-10-PCS | Mod: 26,,, | Performed by: OBSTETRICS & GYNECOLOGY

## 2020-02-15 PROCEDURE — 11000001 HC ACUTE MED/SURG PRIVATE ROOM

## 2020-02-15 PROCEDURE — 99233 SBSQ HOSP IP/OBS HIGH 50: CPT | Mod: 25,,, | Performed by: OBSTETRICS & GYNECOLOGY

## 2020-02-15 RX ADMIN — AMOXICILLIN 500 MG: 250 CAPSULE ORAL at 06:02

## 2020-02-15 RX ADMIN — AMOXICILLIN 500 MG: 250 CAPSULE ORAL at 01:02

## 2020-02-15 RX ADMIN — AMOXICILLIN 500 MG: 250 CAPSULE ORAL at 09:02

## 2020-02-15 RX ADMIN — PRENATAL VIT W/ FE FUMARATE-FA TAB 27-0.8 MG 1 TABLET: 27-0.8 TAB at 09:02

## 2020-02-15 RX ADMIN — AZITHROMYCIN 1000 MG: 250 TABLET, FILM COATED ORAL at 07:02

## 2020-02-15 NOTE — PROGRESS NOTES
[facility]  Obstetrics  Antepartum Progress Note    Patient Name: Marisela Siddiqui  MRN: [id number]  Admission Date: 2/10/2020  Hospital Length of Stay: 5 days  Attending Physician: Siobhan Burch MD  Primary Care Provider: Ashlyn Mayorga MD    Subjective:     Principal Problem: premature rupture of membranes (PPROM) with unknown onset of labor    HPI:  Marisela Siddiqui is a 44 y.o.  who presents for leakage of fluid. She states that around 6 pm today she experienced a large gush of clear fluid with continued leakage. No contractions, vaginal bleeding. Reports good fetal movement. 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    Hospital Course:  2020: admitted for PPROM, abx initiated  2020 - NAEON, fetal tracing reassuring  2020 - PPROM abx D#2. NAEON. FHT overall reassuring throughout the day yesterday. Pt switched to NST TID.   2020 - PPROM abx D#3. NAEON  2020 - PPROM abx D#4. NAEON  02/15/2020 - PPROM abx D#5. NAEON. No complaints this AM    Obstetric HPI:  PPROM abx D#5. NAEON. No complaints this AM    Patient reports no contractions, active fetal movement, absent vaginal bleeding , present loss of fluid. Denies fundal tenderness     Objective:     Vital Signs (Most Recent):  Temp: 98.9 °F (37.2 °C) (02/15/20 0856)  Pulse: 100 (02/15/20 0856)  Resp: 18 (02/15/20 0856)  BP: (!) 142/97 (02/15/20 0856)  SpO2: 98 % (02/15/20 0856) Vital Signs (24h Range):  Temp:  [97 °F (36.1 °C)-98.9 °F (37.2 °C)] 98.9 °F (37.2 °C)  Pulse:  [] 100  Resp:  [16-18] 18  SpO2:  [96 %-98 %] 98 %  BP: (133-151)/(87-97) 142/97     Weight: 83.6 kg (184 lb 4.9 oz)  Body mass index is 32.65 kg/m².    FHT: Cat 1 (reassuring) 145 bpm, moderate BTBV, + accels, - decels  TOCO: No contractions/irritability    No intake or output data in the 24 hours ending 02/15/20  1218    Significant Labs:  Recent Lab Results     None        Physical Exam:   Constitutional: She is oriented to person, place, and time. She appears well-developed and well-nourished. No distress.    HENT:   Head: Normocephalic and atraumatic.    Eyes: Conjunctivae and EOM are normal.    Neck: Normal range of motion. Neck supple. No thyromegaly present.    Cardiovascular: Normal rate and regular rhythm.     Pulmonary/Chest: Effort normal. No respiratory distress.        Abdominal: Soft. There is no tenderness.   Gravid, size appropriate for dates, no fundal tenderness     Genitourinary:   Genitourinary Comments: deferred           Musculoskeletal: Normal range of motion and moves all extremeties. She exhibits no edema or tenderness.       Neurological: She is alert and oriented to person, place, and time.    Skin: Skin is warm and dry. No rash noted.    Psychiatric: She has a normal mood and affect. Her behavior is normal. Judgment and thought content normal.       Assessment/Plan:     44 y.o. female  at 32w5d for:    *  premature rupture of membranes (PPROM)  -- cervix FT/th on admission  -- s/p BMZ for fetal lung maturity  -- PPROM antibiotics D#5   - Azithromycin 1 g PO D#1, 5   - Ampicillin 2 g IV q 6 hours for 48 hours   - Amoxicillin 500 mg PO BID days #3-10  -- NST TID  -- Fetus is vertex as of 2/10  -- Most recent growth scan on  with EFW in the 41%    Gestational hypertension, third trimester  -- diagnosed on admit  -- pt asymptomatic this AM  -- BP: (133-151)/(87-97) 142/97   -- no new labs  -- Normal admission CBC, CMP, cath P:C TLTC  -- Will continue to closely monitor blood pressure    Post partum depression  -- Diagnosed after having a 12 week miscarriage  -- Patient also has history of 2 trisomy 18 pregnancies  -- No current medications  -- Will need 1-2 week mood check on discharge          Pia Morrison MD  Obstetrics  [facility]

## 2020-02-15 NOTE — ASSESSMENT & PLAN NOTE
-- diagnosed on admit  -- pt asymptomatic this AM  -- BP: (133-151)/(87-97) 142/97   -- no new labs  -- Normal admission CBC, CMP, cath P:C TLTC  -- Will continue to closely monitor blood pressure

## 2020-02-15 NOTE — PROGRESS NOTES
02/15/20 1529   Vital Signs   Temp 98.3 °F (36.8 °C)   Temp src Oral   Pulse 86   Heart Rate Source SpO2   Resp 18   SpO2 98 %   Pulse Oximetry Type Intermittent   BP (!) 140/89   MAP (mmHg) 109   BP Location Left arm   BP Method Automatic   Patient Position Lying   Dr. Morrison notified of pt's consistently mid-range BPs. MD verbalized she will order labs for tomorrow's AM draw.

## 2020-02-15 NOTE — ASSESSMENT & PLAN NOTE
-- cervix FT/th on admission  -- s/p BMZ for fetal lung maturity  -- PPROM antibiotics D#5   - Azithromycin 1 g PO D#1, 5   - Ampicillin 2 g IV q 6 hours for 48 hours   - Amoxicillin 500 mg PO BID days #3-10  -- NST TID  -- Fetus is vertex as of 2/10  -- Most recent growth scan on 2/5 with EFW in the 41%

## 2020-02-15 NOTE — SUBJECTIVE & OBJECTIVE
Obstetric HPI:  PPROM abx D#5. NAEON. No complaints this AM    Patient reports no contractions, active fetal movement, absent vaginal bleeding , present loss of fluid. Denies fundal tenderness     Objective:     Vital Signs (Most Recent):  Temp: 98.9 °F (37.2 °C) (02/15/20 0856)  Pulse: 100 (02/15/20 0856)  Resp: 18 (02/15/20 0856)  BP: (!) 142/97 (02/15/20 0856)  SpO2: 98 % (02/15/20 0856) Vital Signs (24h Range):  Temp:  [97 °F (36.1 °C)-98.9 °F (37.2 °C)] 98.9 °F (37.2 °C)  Pulse:  [] 100  Resp:  [16-18] 18  SpO2:  [96 %-98 %] 98 %  BP: (133-151)/(87-97) 142/97     Weight: 83.6 kg (184 lb 4.9 oz)  Body mass index is 32.65 kg/m².    FHT: Cat 1 (reassuring) 145 bpm, moderate BTBV, + accels, - decels  TOCO: No contractions/irritability    No intake or output data in the 24 hours ending 02/15/20 1218    Significant Labs:  Recent Lab Results     None        Physical Exam:   Constitutional: She is oriented to person, place, and time. She appears well-developed and well-nourished. No distress.    HENT:   Head: Normocephalic and atraumatic.    Eyes: Conjunctivae and EOM are normal.    Neck: Normal range of motion. Neck supple. No thyromegaly present.    Cardiovascular: Normal rate and regular rhythm.     Pulmonary/Chest: Effort normal. No respiratory distress.        Abdominal: Soft. There is no tenderness.   Gravid, size appropriate for dates, no fundal tenderness     Genitourinary:   Genitourinary Comments: deferred           Musculoskeletal: Normal range of motion and moves all extremeties. She exhibits no edema or tenderness.       Neurological: She is alert and oriented to person, place, and time.    Skin: Skin is warm and dry. No rash noted.    Psychiatric: She has a normal mood and affect. Her behavior is normal. Judgment and thought content normal.

## 2020-02-16 LAB
ABO + RH BLD: NORMAL
ALBUMIN SERPL BCP-MCNC: 2.4 G/DL (ref 3.5–5.2)
ALP SERPL-CCNC: 79 U/L (ref 55–135)
ALT SERPL W/O P-5'-P-CCNC: 24 U/L (ref 10–44)
ANION GAP SERPL CALC-SCNC: 10 MMOL/L (ref 8–16)
ANISOCYTOSIS BLD QL SMEAR: SLIGHT
AST SERPL-CCNC: 20 U/L (ref 10–40)
BASOPHILS # BLD AUTO: ABNORMAL K/UL (ref 0–0.2)
BASOPHILS NFR BLD: 0 % (ref 0–1.9)
BILIRUB SERPL-MCNC: 0.4 MG/DL (ref 0.1–1)
BILIRUB UR QL STRIP: NEGATIVE
BLD GP AB SCN CELLS X3 SERPL QL: NORMAL
BUN SERPL-MCNC: 13 MG/DL (ref 6–20)
CALCIUM SERPL-MCNC: 9 MG/DL (ref 8.7–10.5)
CHLORIDE SERPL-SCNC: 107 MMOL/L (ref 95–110)
CLARITY UR: CLEAR
CO2 SERPL-SCNC: 20 MMOL/L (ref 23–29)
COLOR UR: YELLOW
CREAT SERPL-MCNC: 0.6 MG/DL (ref 0.5–1.4)
CREAT UR-MCNC: 44 MG/DL (ref 15–325)
DIFFERENTIAL METHOD: ABNORMAL
EOSINOPHIL # BLD AUTO: ABNORMAL K/UL (ref 0–0.5)
EOSINOPHIL NFR BLD: 2 % (ref 0–8)
ERYTHROCYTE [DISTWIDTH] IN BLOOD BY AUTOMATED COUNT: 12.6 % (ref 11.5–14.5)
EST. GFR  (AFRICAN AMERICAN): >60 ML/MIN/1.73 M^2
EST. GFR  (NON AFRICAN AMERICAN): >60 ML/MIN/1.73 M^2
GIANT PLATELETS BLD QL SMEAR: PRESENT
GLUCOSE SERPL-MCNC: 87 MG/DL (ref 70–110)
GLUCOSE UR QL STRIP: NEGATIVE
HCT VFR BLD AUTO: 37.8 % (ref 37–48.5)
HGB BLD-MCNC: 12.5 G/DL (ref 12–16)
HGB UR QL STRIP: NEGATIVE
IMM GRANULOCYTES # BLD AUTO: ABNORMAL K/UL (ref 0–0.04)
IMM GRANULOCYTES NFR BLD AUTO: ABNORMAL % (ref 0–0.5)
KETONES UR QL STRIP: NEGATIVE
LEUKOCYTE ESTERASE UR QL STRIP: NEGATIVE
LYMPHOCYTES # BLD AUTO: ABNORMAL K/UL (ref 1–4.8)
LYMPHOCYTES NFR BLD: 12 % (ref 18–48)
MCH RBC QN AUTO: 28.2 PG (ref 27–31)
MCHC RBC AUTO-ENTMCNC: 33.1 G/DL (ref 32–36)
MCV RBC AUTO: 85 FL (ref 82–98)
METAMYELOCYTES NFR BLD MANUAL: 3 %
MONOCYTES # BLD AUTO: ABNORMAL K/UL (ref 0.3–1)
MONOCYTES NFR BLD: 3 % (ref 4–15)
NEUTROPHILS NFR BLD: 80 % (ref 38–73)
NITRITE UR QL STRIP: NEGATIVE
NRBC BLD-RTO: 0 /100 WBC
OVALOCYTES BLD QL SMEAR: ABNORMAL
PH UR STRIP: 6 [PH] (ref 5–8)
PLATELET # BLD AUTO: 204 K/UL (ref 150–350)
PLATELET BLD QL SMEAR: ABNORMAL
PMV BLD AUTO: 10.8 FL (ref 9.2–12.9)
POTASSIUM SERPL-SCNC: 3.9 MMOL/L (ref 3.5–5.1)
PROT SERPL-MCNC: 5.7 G/DL (ref 6–8.4)
PROT UR QL STRIP: NEGATIVE
PROT UR-MCNC: 7 MG/DL (ref 0–15)
PROT/CREAT UR: 0.16 MG/G{CREAT} (ref 0–0.2)
RBC # BLD AUTO: 4.44 M/UL (ref 4–5.4)
SODIUM SERPL-SCNC: 137 MMOL/L (ref 136–145)
SP GR UR STRIP: <=1.005 (ref 1–1.03)
URN SPEC COLLECT METH UR: ABNORMAL
UROBILINOGEN UR STRIP-ACNC: NEGATIVE EU/DL
WBC # BLD AUTO: 13.9 K/UL (ref 3.9–12.7)

## 2020-02-16 PROCEDURE — 59025 FETAL NON-STRESS TEST: CPT | Mod: 26,,, | Performed by: OBSTETRICS & GYNECOLOGY

## 2020-02-16 PROCEDURE — 81003 URINALYSIS AUTO W/O SCOPE: CPT

## 2020-02-16 PROCEDURE — 84156 ASSAY OF PROTEIN URINE: CPT

## 2020-02-16 PROCEDURE — 99233 PR SUBSEQUENT HOSPITAL CARE,LEVL III: ICD-10-PCS | Mod: 25,,, | Performed by: OBSTETRICS & GYNECOLOGY

## 2020-02-16 PROCEDURE — 99233 SBSQ HOSP IP/OBS HIGH 50: CPT | Mod: 25,,, | Performed by: OBSTETRICS & GYNECOLOGY

## 2020-02-16 PROCEDURE — 85027 COMPLETE CBC AUTOMATED: CPT

## 2020-02-16 PROCEDURE — 25000003 PHARM REV CODE 250: Performed by: STUDENT IN AN ORGANIZED HEALTH CARE EDUCATION/TRAINING PROGRAM

## 2020-02-16 PROCEDURE — 86901 BLOOD TYPING SEROLOGIC RH(D): CPT

## 2020-02-16 PROCEDURE — 80053 COMPREHEN METABOLIC PANEL: CPT

## 2020-02-16 PROCEDURE — 36415 COLL VENOUS BLD VENIPUNCTURE: CPT

## 2020-02-16 PROCEDURE — 11000001 HC ACUTE MED/SURG PRIVATE ROOM

## 2020-02-16 PROCEDURE — 85007 BL SMEAR W/DIFF WBC COUNT: CPT

## 2020-02-16 PROCEDURE — 59025 PR FETAL 2N-STRESS TEST: ICD-10-PCS | Mod: 26,,, | Performed by: OBSTETRICS & GYNECOLOGY

## 2020-02-16 RX ADMIN — AMOXICILLIN 500 MG: 250 CAPSULE ORAL at 03:02

## 2020-02-16 RX ADMIN — AMOXICILLIN 500 MG: 250 CAPSULE ORAL at 05:02

## 2020-02-16 RX ADMIN — AMOXICILLIN 500 MG: 250 CAPSULE ORAL at 10:02

## 2020-02-16 RX ADMIN — PRENATAL VIT W/ FE FUMARATE-FA TAB 27-0.8 MG 1 TABLET: 27-0.8 TAB at 09:02

## 2020-02-16 NOTE — ASSESSMENT & PLAN NOTE
-- diagnosed on admit  -- pt asymptomatic this AM  -- BP: (140-148)/() 148/88: sustained in the mild range now  -- repeat Pre labs this AM  -- Normal admission CBC, CMP, cath P:C TLTC  -- Will continue to closely monitor blood pressure: patient at high risk for PreE given gHTN and AMA

## 2020-02-16 NOTE — PROGRESS NOTES
Pt DND 10p-6a. Instructed patient to call with any issues including vb, ctx, or not feeling baby move. Pt verbalized understanding.

## 2020-02-16 NOTE — ASSESSMENT & PLAN NOTE
-- cervix FT/th on admission  -- s/p BMZ for fetal lung maturity  -- PPROM antibiotics D#6   - s/p Azithromycin 1 g PO D#1, 5   - s/p Ampicillin 2 g IV q 6 hours for 48 hours   - Amoxicillin 500 mg PO BID days #3-10  -- NST TID for patient preference; all reactive and reassuring so far  -- Fetus is vertex as of 2/10  -- Most recent growth scan on 2/5 with EFW in the 41%

## 2020-02-16 NOTE — PROGRESS NOTES
[facility]  Obstetrics  Antepartum Progress Note    Patient Name: Marisela Siddiqui  MRN: [id number]  Admission Date: 2/10/2020  Hospital Length of Stay: 5 days  Attending Physician: Siobhan Burch MD  Primary Care Provider: Ashlyn Mayorga MD    Subjective:     Principal Problem: premature rupture of membranes (PPROM) with unknown onset of labor    HPI:  Marisela Siddiqui is a 44 y.o.  who presents for leakage of fluid. She states that around 6 pm today she experienced a large gush of clear fluid with continued leakage. No contractions, vaginal bleeding. Reports good fetal movement. 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    Hospital Course:  2020: admitted for PPROM, abx initiated  2020 - NAEON, fetal tracing reassuring  2020 - PPROM abx D#2. NAEON. FHT overall reassuring throughout the day yesterday. Pt switched to NST TID.   2020 - PPROM abx D#3. NAEON  2020 - PPROM abx D#4. NAEON  02/15/2020 - PPROM abx D#5. NAEON. No complaints this AM  2020 - PPROM abx D#6. No issues overnight. Yesterday, BP was sustained in the mild range - a change from previous days in the normal range with occasional mild range pressures. Repeat PreE labs ordered for collection this AM. Previous PCR was tltc. Patient denies HA, vision changes, SOB, CP, RUQ pain.     Obstetric HPI:  PPROM abx D#6. No issues overnight. Yesterday, BP was sustained in the mild range - a change from previous days in the normal range with occasional mild range pressures. Repeat PreE labs ordered for collection this AM. Previous PCR was tltc. Patient denies HA, vision changes, SOB, CP, RUQ pain.     Patient reports no contractions, active fetal movement, no vaginal bleeding or loss of fluid.     Objective:     Vital Signs (Most Recent):  Temp: 96.1 °F (35.6 °C) (02/15/20 2155)  Pulse: 91 (02/15/20  )  Resp: 18 (02/15/20 1529)  BP: (!) 148/88 (02/15/20 2155)  SpO2: 96 % (02/15/20 2058) Vital Signs (24h Range):  Temp:  [96.1 °F (35.6 °C)-98.9 °F (37.2 °C)] 96.1 °F (35.6 °C)  Pulse:  [] 91  Resp:  [18] 18  SpO2:  [96 %-98 %] 96 %  BP: (140-148)/() 148/88     Weight: 83.6 kg (184 lb 4.9 oz)  Body mass index is 32.65 kg/m².    PM FHT: Cat 1 (reassuring) 145 bpm, moderate BTBV, + accels, - decels  TOCO: No contractions/irritability    No intake or output data in the 24 hours ending 02/15/20 2220    Cervical Exam: deferred      Significant Labs:  Recent Lab Results     None        Physical Exam:   Constitutional: She is oriented to person, place, and time. She appears well-developed and well-nourished. No distress.    HENT:   Head: Normocephalic and atraumatic.    Eyes: Conjunctivae and EOM are normal.    Neck: Normal range of motion. Neck supple. No thyromegaly present.    Cardiovascular: Normal rate and regular rhythm.     Pulmonary/Chest: Effort normal. No respiratory distress.        Abdominal: Soft. There is no tenderness.   Gravid, size appropriate for dates, no fundal tenderness     Genitourinary:   Genitourinary Comments: deferred           Musculoskeletal: Normal range of motion and moves all extremeties. She exhibits no edema or tenderness.       Neurological: She is alert and oriented to person, place, and time.    Skin: Skin is warm and dry. No rash noted.    Psychiatric: She has a normal mood and affect. Her behavior is normal. Judgment and thought content normal.       Assessment/Plan:     44 y.o. female  at 32w5d for:    *  premature rupture of membranes (PPROM)  -- cervix FT/th on admission  -- s/p BMZ for fetal lung maturity  -- PPROM antibiotics D#6   - s/p Azithromycin 1 g PO D#1, 5   - s/p Ampicillin 2 g IV q 6 hours for 48 hours   - Amoxicillin 500 mg PO BID days #3-10  -- NST TID for patient preference; all reactive and reassuring so far  -- Fetus is vertex as of  2/10  -- Most recent growth scan on 2/5 with EFW in the 41%    Gestational hypertension, third trimester  -- diagnosed on admit  -- pt asymptomatic this AM  -- BP: (140-148)/() 148/88: sustained in the mild range now  -- repeat Pre labs this AM  -- Normal admission CBC, CMP, cath P:C TLTC  -- Will continue to closely monitor blood pressure: patient at high risk for PreE given gHTN and AMA    History of poor pregnancy outcome (T18 x2)  - no acute needs    Post partum depression  -- Diagnosed after having a 12 week miscarriage  -- Patient also has history of 2 trisomy 18 pregnancies  -- No current medications  -- Will need 1-2 week mood check on discharge          Pia Morrison MD  Obstetrics  [facility]

## 2020-02-16 NOTE — SUBJECTIVE & OBJECTIVE
Obstetric HPI:  PPROM abx D#6. No issues overnight. Yesterday, BP was sustained in the mild range - a change from previous days in the normal range with occasional mild range pressures. Repeat PreE labs ordered for collection this AM. Previous PCR was tltc. Patient denies HA, vision changes, SOB, CP, RUQ pain.     Patient reports no contractions, active fetal movement, no vaginal bleeding or loss of fluid.     Objective:     Vital Signs (Most Recent):  Temp: 96.1 °F (35.6 °C) (02/15/20 2155)  Pulse: 91 (02/15/20 2155)  Resp: 18 (02/15/20 1529)  BP: (!) 148/88 (02/15/20 2155)  SpO2: 96 % (02/15/20 2058) Vital Signs (24h Range):  Temp:  [96.1 °F (35.6 °C)-98.9 °F (37.2 °C)] 96.1 °F (35.6 °C)  Pulse:  [] 91  Resp:  [18] 18  SpO2:  [96 %-98 %] 96 %  BP: (140-148)/() 148/88     Weight: 83.6 kg (184 lb 4.9 oz)  Body mass index is 32.65 kg/m².    PM FHT: Cat 1 (reassuring) 145 bpm, moderate BTBV, + accels, - decels  TOCO: No contractions/irritability    No intake or output data in the 24 hours ending 02/15/20 2220    Cervical Exam: deferred      Significant Labs:  Recent Lab Results     None        Physical Exam:   Constitutional: She is oriented to person, place, and time. She appears well-developed and well-nourished. No distress.    HENT:   Head: Normocephalic and atraumatic.    Eyes: Conjunctivae and EOM are normal.    Neck: Normal range of motion. Neck supple. No thyromegaly present.    Cardiovascular: Normal rate and regular rhythm.     Pulmonary/Chest: Effort normal. No respiratory distress.        Abdominal: Soft. There is no tenderness.   Gravid, size appropriate for dates, no fundal tenderness     Genitourinary:   Genitourinary Comments: deferred           Musculoskeletal: Normal range of motion and moves all extremeties. She exhibits no edema or tenderness.       Neurological: She is alert and oriented to person, place, and time.    Skin: Skin is warm and dry. No rash noted.    Psychiatric: She has a  normal mood and affect. Her behavior is normal. Judgment and thought content normal.

## 2020-02-16 NOTE — PLAN OF CARE
VSS. FHTs reassuring. Pt states +fm. Continues to leak clear fluid. Denies vaginal bleeding or contractions. S/S of pre-e reviewed. Pt denies symptoms at present. Spouse, friends and therapy dog visited patient at bedside today.  also visited bedside. Plan of care reviewed. All questions answered. Will continue to monitor.

## 2020-02-17 PROCEDURE — 25000003 PHARM REV CODE 250: Performed by: STUDENT IN AN ORGANIZED HEALTH CARE EDUCATION/TRAINING PROGRAM

## 2020-02-17 PROCEDURE — 11000001 HC ACUTE MED/SURG PRIVATE ROOM

## 2020-02-17 PROCEDURE — 99222 1ST HOSP IP/OBS MODERATE 55: CPT | Mod: ,,, | Performed by: OBSTETRICS & GYNECOLOGY

## 2020-02-17 PROCEDURE — 99222 PR INITIAL HOSPITAL CARE,LEVL II: ICD-10-PCS | Mod: ,,, | Performed by: OBSTETRICS & GYNECOLOGY

## 2020-02-17 RX ADMIN — AMOXICILLIN 500 MG: 250 CAPSULE ORAL at 06:02

## 2020-02-17 RX ADMIN — AMOXICILLIN 500 MG: 250 CAPSULE ORAL at 03:02

## 2020-02-17 RX ADMIN — PRENATAL VIT W/ FE FUMARATE-FA TAB 27-0.8 MG 1 TABLET: 27-0.8 TAB at 09:02

## 2020-02-17 NOTE — ASSESSMENT & PLAN NOTE
-- cervix FT/th on admission  -- s/p BMZ for fetal lung maturity  -- PPROM antibiotics D#7   - s/p Azithromycin 1 g PO D#1, 5   - s/p Ampicillin 2 g IV q 6 hours for 48 hours   - Amoxicillin 500 mg PO BID days #3-10  -- NST TID for patient preference; all reactive and reassuring so far  -- Fetus is vertex as of 2/10  -- Most recent growth scan on 2/5 with EFW in the 41%

## 2020-02-17 NOTE — PROGRESS NOTES
[facility]  Obstetrics  Antepartum Progress Note    Patient Name: Marisela Siddiqui  MRN: [id number]  Admission Date: 2/10/2020  Hospital Length of Stay: 7 days  Attending Physician: Siobhan Burch MD  Primary Care Provider: Ashlyn Mayorga MD    Subjective:     Principal Problem: premature rupture of membranes (PPROM) with unknown onset of labor    HPI:  Marisela Siddiqui is a 44 y.o.  who presents for leakage of fluid. She states that around 6 pm today she experienced a large gush of clear fluid with continued leakage. No contractions, vaginal bleeding. Reports good fetal movement. 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    Hospital Course:  2020: admitted for PPROM, abx initiated  2020 - NAEON, fetal tracing reassuring  2020 - PPROM abx D#2. NAEON. FHT overall reassuring throughout the day yesterday. Pt switched to NST TID.   2020 - PPROM abx D#3. NAEON  2020 - PPROM abx D#4. NAEON  02/15/2020 - PPROM abx D#5. NAEON. No complaints this AM  2020 - PPROM abx D#6. No issues overnight. Yesterday, BP was sustained in the mild range - a change from previous days in the normal range with occasional mild range pressures. Repeat PreE labs ordered for collection this AM. Previous PCR was tltc. Patient denies HA, vision changes, SOB, CP, RUQ pain.   2020 - pt complained of dysuria overnight, UA neg. Otherwise no complaints this AM    Obstetric HPI:  PPROM abx D#7. No issues overnight. Pt reports discomfort on urination following catheterized specimen yesterday, clean catch UA unremarkable. Patient denies HA, vision changes, SOB, CP, RUQ pain.     Patient reports no contractions, active fetal movement, no vaginal bleeding or loss of fluid.     Objective:     Vital Signs (Most Recent):  Temp: 97 °F (36.1 °C) (20)  Pulse: 83 (20)  Resp:  18 (02/17/20 0616)  BP: 122/80 (02/17/20 0616)  SpO2: 99 % (02/16/20 2203) Vital Signs (24h Range):  Temp:  [96.8 °F (36 °C)-98.1 °F (36.7 °C)] 97 °F (36.1 °C)  Pulse:  [] 83  Resp:  [16-20] 18  SpO2:  [96 %-99 %] 99 %  BP: (122-139)/(73-88) 122/80     Weight: 83.6 kg (184 lb 4.9 oz)  Body mass index is 32.65 kg/m².    PM FHT: Cat 1 (reassuring) 135 bpm, moderate BTBV, + accels, - decels  TOCO: No contractions/irritability    No intake or output data in the 24 hours ending 02/17/20 0712    Cervical Exam: deferred      Significant Labs:  Recent Lab Results       02/16/20  2045   02/16/20  1745   02/16/20  1119        Appearance, UA Clear         Bilirubin (UA) Negative         Color, UA Yellow         Creatinine, Random Ur     44.0  Comment:  The random urine reference ranges provided were established   for 24 hour urine collections.  No reference ranges exist for  random urine specimens.  Correlate clinically.       Glucose, UA Negative         Group & Rh   O POS       INDIRECT TIMI   NEG       Ketones, UA Negative         Leukocytes, UA Negative         NITRITE UA Negative         Occult Blood UA Negative         pH, UA 6.0         Prot/Creat Ratio, Ur     0.16     Protein, UA Negative  Comment:  Recommend a 24 hour urine protein or a urine   protein/creatinine ratio if globulin induced proteinuria is  clinically suspected.           Protein, Urine Random     7  Comment:  The random urine reference ranges provided were established   for 24 hour urine collections.  No reference ranges exist for  random urine specimens.  Correlate clinically.       Specific Gravity, UA <=1.005         Specimen UA Urine, Clean Catch         UROBILINOGEN UA Negative             Physical Exam:   Constitutional: She is oriented to person, place, and time. She appears well-developed and well-nourished. No distress.    HENT:   Head: Normocephalic and atraumatic.    Eyes: Conjunctivae and EOM are normal.    Neck: Normal range of  motion. Neck supple. No thyromegaly present.    Cardiovascular: Normal rate and regular rhythm.     Pulmonary/Chest: Effort normal. No respiratory distress.        Abdominal: Soft. There is no tenderness.   Gravid, size appropriate for dates, no fundal tenderness             Musculoskeletal: Normal range of motion and moves all extremeties. She exhibits no edema or tenderness.       Neurological: She is alert and oriented to person, place, and time.    Skin: Skin is warm and dry. No rash noted.    Psychiatric: She has a normal mood and affect. Her behavior is normal. Judgment and thought content normal.       Assessment/Plan:     44 y.o. female  at 33w0d for:    *  premature rupture of membranes (PPROM)  -- cervix FT/th on admission  -- s/p BMZ for fetal lung maturity  -- PPROM antibiotics D#7   - s/p Azithromycin 1 g PO D#1, 5   - s/p Ampicillin 2 g IV q 6 hours for 48 hours   - Amoxicillin 500 mg PO BID days #3-10  -- NST TID for patient preference; all reactive and reassuring so far  -- Fetus is vertex as of 2/10  -- Most recent growth scan on  with EFW in the 41%    Gestational hypertension, third trimester  -- diagnosed on admit  -- pt asymptomatic this AM  -- BP: (140-148)/() 148/88  -- Normal admission CBC, CMP, cath P:C TLTC; repeat preE labs  WNL, P:C 0.16  -- Will continue to closely monitor blood pressure: patient at high risk for PreE given gHTN and AMA    History of poor pregnancy outcome (T18 x2)  - no acute needs    Post partum depression  -- Diagnosed after having a 12 week miscarriage  -- Patient also has history of 2 trisomy 18 pregnancies  -- No current medications  -- Will need 1-2 week mood check on discharge          Jackie Kim MD  Obstetrics  [facility]

## 2020-02-17 NOTE — ASSESSMENT & PLAN NOTE
-- diagnosed on admit  -- pt asymptomatic this AM  -- BP: (140-148)/() 148/88  -- Normal admission CBC, CMP, cath P:C TLTC; repeat preE labs 2/16 WNL, P:C 0.16  -- Will continue to closely monitor blood pressure: patient at high risk for PreE given gHTN and AMA

## 2020-02-17 NOTE — SUBJECTIVE & OBJECTIVE
Obstetric HPI:  PPROM abx D#7. No issues overnight. Pt reports discomfort on urination following catheterized specimen yesterday, clean catch UA unremarkable. Patient denies HA, vision changes, SOB, CP, RUQ pain.     Patient reports no contractions, active fetal movement, no vaginal bleeding or loss of fluid.     Objective:     Vital Signs (Most Recent):  Temp: 97 °F (36.1 °C) (02/17/20 0616)  Pulse: 83 (02/17/20 0616)  Resp: 18 (02/17/20 0616)  BP: 122/80 (02/17/20 0616)  SpO2: 99 % (02/16/20 2203) Vital Signs (24h Range):  Temp:  [96.8 °F (36 °C)-98.1 °F (36.7 °C)] 97 °F (36.1 °C)  Pulse:  [] 83  Resp:  [16-20] 18  SpO2:  [96 %-99 %] 99 %  BP: (122-139)/(73-88) 122/80     Weight: 83.6 kg (184 lb 4.9 oz)  Body mass index is 32.65 kg/m².    PM FHT: Cat 1 (reassuring) 135 bpm, moderate BTBV, + accels, - decels  TOCO: No contractions/irritability    No intake or output data in the 24 hours ending 02/17/20 0712    Cervical Exam: deferred      Significant Labs:  Recent Lab Results       02/16/20  2045   02/16/20  1745   02/16/20  1119        Appearance, UA Clear         Bilirubin (UA) Negative         Color, UA Yellow         Creatinine, Random Ur     44.0  Comment:  The random urine reference ranges provided were established   for 24 hour urine collections.  No reference ranges exist for  random urine specimens.  Correlate clinically.       Glucose, UA Negative         Group & Rh   O POS       INDIRECT TIMI   NEG       Ketones, UA Negative         Leukocytes, UA Negative         NITRITE UA Negative         Occult Blood UA Negative         pH, UA 6.0         Prot/Creat Ratio, Ur     0.16     Protein, UA Negative  Comment:  Recommend a 24 hour urine protein or a urine   protein/creatinine ratio if globulin induced proteinuria is  clinically suspected.           Protein, Urine Random     7  Comment:  The random urine reference ranges provided were established   for 24 hour urine collections.  No reference ranges  exist for  random urine specimens.  Correlate clinically.       Specific Gravity, UA <=1.005         Specimen UA Urine, Clean Catch         UROBILINOGEN UA Negative             Physical Exam:   Constitutional: She is oriented to person, place, and time. She appears well-developed and well-nourished. No distress.    HENT:   Head: Normocephalic and atraumatic.    Eyes: Conjunctivae and EOM are normal.    Neck: Normal range of motion. Neck supple. No thyromegaly present.    Cardiovascular: Normal rate and regular rhythm.     Pulmonary/Chest: Effort normal. No respiratory distress.        Abdominal: Soft. There is no tenderness.   Gravid, size appropriate for dates, no fundal tenderness             Musculoskeletal: Normal range of motion and moves all extremeties. She exhibits no edema or tenderness.       Neurological: She is alert and oriented to person, place, and time.    Skin: Skin is warm and dry. No rash noted.    Psychiatric: She has a normal mood and affect. Her behavior is normal. Judgment and thought content normal.

## 2020-02-17 NOTE — PLAN OF CARE
Patient doing well; no acute changes or complaints throughout the night. VS stable and afebrile. Patient reports +FM and continues to leak clear fluid. Denies vaginal bleeding or contractions. Patient denies headache, blurry vision, or RUQ pain.     Plan of care reviewed with patient. All questions and concerns addressed at this time.

## 2020-02-18 LAB
PROT 24H UR-MRATE: 525 MG/SPEC (ref 0–100)
PROT UR-MCNC: 15 MG/DL (ref 0–15)
URINE COLLECTION DURATION: 24 HR
URINE VOLUME: 3500 ML

## 2020-02-18 PROCEDURE — 99024 PR POST-OP FOLLOW-UP VISIT: ICD-10-PCS | Mod: ,,, | Performed by: PEDIATRICS

## 2020-02-18 PROCEDURE — 84156 ASSAY OF PROTEIN URINE: CPT

## 2020-02-18 PROCEDURE — 99024 POSTOP FOLLOW-UP VISIT: CPT | Mod: ,,, | Performed by: PEDIATRICS

## 2020-02-18 PROCEDURE — 25000003 PHARM REV CODE 250: Performed by: STUDENT IN AN ORGANIZED HEALTH CARE EDUCATION/TRAINING PROGRAM

## 2020-02-18 PROCEDURE — 11000001 HC ACUTE MED/SURG PRIVATE ROOM

## 2020-02-18 PROCEDURE — 59025 FETAL NON-STRESS TEST: CPT

## 2020-02-18 RX ADMIN — PRENATAL VIT W/ FE FUMARATE-FA TAB 27-0.8 MG 1 TABLET: 27-0.8 TAB at 10:02

## 2020-02-18 NOTE — ASSESSMENT & PLAN NOTE
-- diagnosed on admit  -- pt asymptomatic this AM  -- BP: (129-142)/(78-86) 134/84  -- Normal admission CBC, CMP, cath P:C TLTC; repeat preE labs 2/16 WNL, P:C 0.16  -- 24 hour urine in progress  -- Will continue to closely monitor blood pressure: patient at high risk for PreE given gHTN and AMA

## 2020-02-18 NOTE — ASSESSMENT & PLAN NOTE
-- cervix FT/th on admission  -- s/p BMZ for fetal lung maturity  -- PPROM antibiotics D#8   - s/p Azithromycin 1 g PO D#1, 5   - s/p Ampicillin 2 g IV q 6 hours for 48 hours   - Amoxicillin 500 mg PO BID days #3-10  -- NST TID for patient preference; all reactive and reassuring so far  -- Fetus is vertex as of 2/10  -- Most recent growth scan on 2/5 with EFW in the 41%

## 2020-02-18 NOTE — PLAN OF CARE
Pt doing well and not acute changes. 24 hr urine was initiated at 9am. VSS. NST x2 reactive. Pt denies contractions and vaginal bleeding. Reports +fetal movements. Plan of care reviewed, all concerns addressed.

## 2020-02-18 NOTE — PLAN OF CARE
Patient doing well; no acute changes throughout the night. VS stable and afebrile. Patient reports continuing to leak clear fluid and +FM; reactive NST x1. Denies vaginal bleeding or contractions. Patient denies headache, blurry vision, or RUQ pain. 24hr urine initiated on 02/17 and to end at 9AM this morning.     Plan of care reviewed with patient and spouse. Spouse and emotional support dog at bedside before bedtime. All questions and concerns addressed at this time.

## 2020-02-18 NOTE — SUBJECTIVE & OBJECTIVE
Obstetric HPI:  PPROM abx D#8. No issues overnight. Pt reports discomfort on urination following catheterized specimen yesterday, clean catch UA unremarkable. Patient denies HA, vision changes, SOB, CP, RUQ pain.     Patient reports no contractions, active fetal movement, no vaginal bleeding or loss of fluid.     Objective:     Vital Signs (Most Recent):  Temp: 97.2 °F (36.2 °C) (02/18/20 0607)  Pulse: 97 (02/18/20 0607)  Resp: 16 (02/18/20 0607)  BP: 134/84 (02/18/20 0607)  SpO2: 99 % (02/18/20 0607) Vital Signs (24h Range):  Temp:  [96.6 °F (35.9 °C)-97.8 °F (36.6 °C)] 97.2 °F (36.2 °C)  Pulse:  [] 97  Resp:  [16-18] 16  SpO2:  [96 %-99 %] 99 %  BP: (129-142)/(78-86) 134/84     Weight: 83.6 kg (184 lb 4.9 oz)  Body mass index is 32.65 kg/m².    PM FHT: Cat 1 (reassuring) 135 bpm, moderate BTBV, + accels, - decels  TOCO: No contractions/irritability    No intake or output data in the 24 hours ending 02/18/20 0633    Cervical Exam: deferred      Significant Labs:  Recent Lab Results     None        Physical Exam:   Constitutional: She is oriented to person, place, and time. She appears well-developed and well-nourished. No distress.    HENT:   Head: Normocephalic and atraumatic.    Eyes: Conjunctivae and EOM are normal.    Neck: Normal range of motion. Neck supple. No thyromegaly present.    Cardiovascular: Normal rate and regular rhythm.     Pulmonary/Chest: Effort normal. No respiratory distress.        Abdominal: Soft. There is no tenderness.   Gravid, size appropriate for dates, no fundal tenderness             Musculoskeletal: Normal range of motion and moves all extremeties. She exhibits no edema or tenderness.       Neurological: She is alert and oriented to person, place, and time.    Skin: Skin is warm and dry. No rash noted.    Psychiatric: She has a normal mood and affect. Her behavior is normal. Judgment and thought content normal.

## 2020-02-18 NOTE — PROGRESS NOTES
[facility]  Obstetrics  Antepartum Progress Note    Patient Name: Marisela Siddiqui  MRN: [id number]  Admission Date: 2/10/2020  Hospital Length of Stay: 8 days  Attending Physician: Siobhan Burch MD  Primary Care Provider: Ashlyn Mayorga MD    Subjective:     Principal Problem: premature rupture of membranes (PPROM) with unknown onset of labor    HPI:  Marisela Siddiqui is a 44 y.o.  who presents for leakage of fluid. She states that around 6 pm today she experienced a large gush of clear fluid with continued leakage. No contractions, vaginal bleeding. Reports good fetal movement. 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    Hospital Course:  2020: admitted for PPROM, abx initiated  2020 - NAEON, fetal tracing reassuring  2020 - PPROM abx D#2. NAEON. FHT overall reassuring throughout the day yesterday. Pt switched to NST TID.   2020 - PPROM abx D#3. NAEON  2020 - PPROM abx D#4. NAEON  02/15/2020 - PPROM abx D#5. NAEON. No complaints this AM  2020 - PPROM abx D#6. No issues overnight. Yesterday, BP was sustained in the mild range - a change from previous days in the normal range with occasional mild range pressures. Repeat PreE labs ordered for collection this AM. Previous PCR was tltc. Patient denies HA, vision changes, SOB, CP, RUQ pain.   2020 - pt complained of dysuria overnight, UA neg. Otherwise no complaints this AM. 24 hour urine initiated secondary to P:C 0.16.  2020 - NAEON. BP mild range    Obstetric HPI:  PPROM abx D#8. No issues overnight. Pt reports discomfort on urination following catheterized specimen yesterday, clean catch UA unremarkable. Patient denies HA, vision changes, SOB, CP, RUQ pain.     Patient reports no contractions, active fetal movement, no vaginal bleeding or loss of fluid.     Objective:     Vital Signs  (Most Recent):  Temp: 97.2 °F (36.2 °C) (20)  Pulse: 97 (20)  Resp: 16 (20)  BP: 134/84 (20)  SpO2: 99 % (20) Vital Signs (24h Range):  Temp:  [96.6 °F (35.9 °C)-97.8 °F (36.6 °C)] 97.2 °F (36.2 °C)  Pulse:  [] 97  Resp:  [16-18] 16  SpO2:  [96 %-99 %] 99 %  BP: (129-142)/(78-86) 134/84     Weight: 83.6 kg (184 lb 4.9 oz)  Body mass index is 32.65 kg/m².    PM FHT: Cat 1 (reassuring) 135 bpm, moderate BTBV, + accels, - decels  TOCO: No contractions/irritability    No intake or output data in the 24 hours ending 20    Cervical Exam: deferred      Significant Labs:  Recent Lab Results     None        Physical Exam:   Constitutional: She is oriented to person, place, and time. She appears well-developed and well-nourished. No distress.    HENT:   Head: Normocephalic and atraumatic.    Eyes: Conjunctivae and EOM are normal.    Neck: Normal range of motion. Neck supple. No thyromegaly present.    Cardiovascular: Normal rate and regular rhythm.     Pulmonary/Chest: Effort normal. No respiratory distress.        Abdominal: Soft. There is no tenderness.   Gravid, size appropriate for dates, no fundal tenderness             Musculoskeletal: Normal range of motion and moves all extremeties. She exhibits no edema or tenderness.       Neurological: She is alert and oriented to person, place, and time.    Skin: Skin is warm and dry. No rash noted.    Psychiatric: She has a normal mood and affect. Her behavior is normal. Judgment and thought content normal.       Assessment/Plan:     44 y.o. female  at 33w1d for:    *  premature rupture of membranes (PPROM)  -- cervix FT/th on admission  -- s/p BMZ for fetal lung maturity  -- PPROM antibiotics D#8   - s/p Azithromycin 1 g PO D#1, 5   - s/p Ampicillin 2 g IV q 6 hours for 48 hours   - Amoxicillin 500 mg PO BID days #3-10  -- NST TID for patient preference; all reactive and reassuring so far  --  Fetus is vertex as of 2/10  -- Most recent growth scan on 2/5 with EFW in the 41%    Gestational hypertension, third trimester  -- diagnosed on admit  -- pt asymptomatic this AM  -- BP: (129-142)/(78-86) 134/84  -- Normal admission CBC, CMP, cath P:C TLTC; repeat preE labs 2/16 WNL, P:C 0.16  -- 24 hour urine in progress  -- Will continue to closely monitor blood pressure: patient at high risk for PreE given gHTN and AMA    History of poor pregnancy outcome (T18 x2)  - no acute needs    Post partum depression  -- Diagnosed after having a 12 week miscarriage  -- Patient also has history of 2 trisomy 18 pregnancies  -- No current medications  -- Will need 1-2 week mood check on discharge          Jackie Kim MD  Obstetrics  [facility]

## 2020-02-19 PROCEDURE — 59025 FETAL NON-STRESS TEST: CPT | Mod: 26,59,, | Performed by: OBSTETRICS & GYNECOLOGY

## 2020-02-19 PROCEDURE — 59025 FETAL NON-STRESS TEST: CPT

## 2020-02-19 PROCEDURE — 59025 PR FETAL 2N-STRESS TEST: ICD-10-PCS | Mod: 26,59,, | Performed by: OBSTETRICS & GYNECOLOGY

## 2020-02-19 PROCEDURE — 11000001 HC ACUTE MED/SURG PRIVATE ROOM

## 2020-02-19 PROCEDURE — 25000003 PHARM REV CODE 250: Performed by: STUDENT IN AN ORGANIZED HEALTH CARE EDUCATION/TRAINING PROGRAM

## 2020-02-19 PROCEDURE — 99232 SBSQ HOSP IP/OBS MODERATE 35: CPT | Mod: 25,,, | Performed by: OBSTETRICS & GYNECOLOGY

## 2020-02-19 PROCEDURE — 99232 PR SUBSEQUENT HOSPITAL CARE,LEVL II: ICD-10-PCS | Mod: 25,,, | Performed by: OBSTETRICS & GYNECOLOGY

## 2020-02-19 RX ORDER — VALACYCLOVIR HYDROCHLORIDE 500 MG/1
500 TABLET, FILM COATED ORAL 2 TIMES DAILY
Status: DISCONTINUED | OUTPATIENT
Start: 2020-02-19 | End: 2020-02-24 | Stop reason: HOSPADM

## 2020-02-19 RX ADMIN — PRENATAL VIT W/ FE FUMARATE-FA TAB 27-0.8 MG 1 TABLET: 27-0.8 TAB at 10:02

## 2020-02-19 RX ADMIN — VALACYCLOVIR HYDROCHLORIDE 500 MG: 500 TABLET, FILM COATED ORAL at 09:02

## 2020-02-19 RX ADMIN — ACETAMINOPHEN 650 MG: 325 TABLET ORAL at 10:02

## 2020-02-19 RX ADMIN — VALACYCLOVIR HYDROCHLORIDE 500 MG: 500 TABLET, FILM COATED ORAL at 10:02

## 2020-02-19 NOTE — PROGRESS NOTES
[facility]  Obstetrics  Antepartum Progress Note    Patient Name: Marisela Siddiqui  MRN: [id number]  Admission Date: 2/10/2020  Hospital Length of Stay: 9 days  Attending Physician: Siobhan Burch MD  Primary Care Provider: Ashlyn Mayorga MD    Subjective:     Principal Problem: premature rupture of membranes (PPROM) with unknown onset of labor    HPI:  Marisela Siddiqui is a 44 y.o.  who presents for leakage of fluid. She states that around 6 pm today she experienced a large gush of clear fluid with continued leakage. No contractions, vaginal bleeding. Reports good fetal movement. 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    Hospital Course:  2020: admitted for PPROM, abx initiated  2020 - NAEON, fetal tracing reassuring  2020 - PPROM abx D#2. NAEON. FHT overall reassuring throughout the day yesterday. Pt switched to NST TID.   2020 - PPROM abx D#3. NAEON  2020 - PPROM abx D#4. NAEON  02/15/2020 - PPROM abx D#5. NAEON. No complaints this AM  2020 - PPROM abx D#6. No issues overnight. Yesterday, BP was sustained in the mild range - a change from previous days in the normal range with occasional mild range pressures. Repeat PreE labs ordered for collection this AM. Previous PCR was tltc. Patient denies HA, vision changes, SOB, CP, RUQ pain.   2020 - pt complained of dysuria overnight, UA neg. Otherwise no complaints this AM. 24 hour urine initiated secondary to P:C 0.16.  2020 - NAEON. BP mild range  2020 - NAEON    Obstetric HPI:  PPROM abx D#9. No issues overnight. Patient denies HA, vision changes, SOB, CP, RUQ pain.     Patient reports no contractions, active fetal movement, no vaginal bleeding or loss of fluid.     Objective:     Vital Signs (Most Recent):  Temp: 97.8 °F (36.6 °C) (20)  Pulse: 94 (20)  Resp:  16 (20 0604)  BP: (!) 126/93 (20 0604)  SpO2: 98 % (20 06) Vital Signs (24h Range):  Temp:  [96.4 °F (35.8 °C)-98.2 °F (36.8 °C)] 97.8 °F (36.6 °C)  Pulse:  [] 94  Resp:  [16-18] 16  SpO2:  [97 %-98 %] 98 %  BP: (126-137)/(80-97) 126/93     Weight: 83.6 kg (184 lb 4.9 oz)  Body mass index is 32.65 kg/m².    PM FHT: Cat 1 (reassuring) 130 bpm, moderate BTBV, + accels, - decels  TOCO: No contractions/irritability    No intake or output data in the 24 hours ending 20 06    Cervical Exam: deferred      Significant Labs:  Recent Lab Results       20  0900        Urine Protein, Timed 525     PROTEIN URINE 15     Urine Collection Duration 24     Urine Volume 3500         Physical Exam:   Constitutional: She is oriented to person, place, and time. She appears well-developed and well-nourished. No distress.    HENT:   Head: Normocephalic and atraumatic.    Eyes: Conjunctivae and EOM are normal.    Neck: Normal range of motion. Neck supple. No thyromegaly present.    Cardiovascular: Normal rate and regular rhythm.     Pulmonary/Chest: Effort normal. No respiratory distress.        Abdominal: Soft. There is no tenderness.   Gravid, size appropriate for dates, no fundal tenderness             Musculoskeletal: Normal range of motion and moves all extremeties. She exhibits no edema or tenderness.       Neurological: She is alert and oriented to person, place, and time.    Skin: Skin is warm and dry. No rash noted.    Psychiatric: She has a normal mood and affect. Her behavior is normal. Judgment and thought content normal.       Assessment/Plan:     44 y.o. female  at 33w2d for:    *  premature rupture of membranes (PPROM)  -- cervix FT/th on admission  -- s/p BMZ for fetal lung maturity  -- PPROM antibiotics D#9   - s/p Azithromycin 1 g PO D#1, 5   - s/p Ampicillin 2 g IV q 6 hours for 48 hours   - Amoxicillin 500 mg PO BID days #3-10  -- NST TID for patient preference; all  reactive and reassuring so far  -- Fetus is vertex as of 2/10  -- Most recent growth scan on 2/5 with EFW in the 41%    Gestational hypertension, third trimester  -- diagnosed on admit  -- pt asymptomatic this AM  -- BP: (129-142)/(78-86) 134/84  -- Normal admission CBC, CMP, cath P:C TLTC; repeat preE labs 2/16 WNL, P:C 0.16  -- 24 hour urine in progress  -- Will continue to closely monitor blood pressure: patient at high risk for PreE given gHTN and AMA    History of poor pregnancy outcome (T18 x2)  - no acute needs    Recurrent oral herpes simplex  - valtrex ppx    Post partum depression  -- Diagnosed after having a 12 week miscarriage  -- Patient also has history of 2 trisomy 18 pregnancies  -- No current medications  -- Will need 1-2 week mood check on discharge          Jackie Kim MD  Obstetrics  [facility]

## 2020-02-19 NOTE — CARE UPDATE
MD to bedside. Denies repeat episode of bleeding. Reports mild light pink tinged mucous on pad. Denies abd cramping or ctx. Normal FM.    Temp: 97.8  Mild tachycardia: 110s  FHT: 145, mod BTBV, +accels, no decels    - continuous fetal monitoring  - anticipate d/c of continuous fetal monitoring this PM, will resume NST TID    Jackie Kim MD   OBGYN, PGY-2

## 2020-02-19 NOTE — SUBJECTIVE & OBJECTIVE
Obstetric HPI:  PPROM abx D#9. No issues overnight. Patient denies HA, vision changes, SOB, CP, RUQ pain.     Patient reports no contractions, active fetal movement, no vaginal bleeding or loss of fluid.     Objective:     Vital Signs (Most Recent):  Temp: 97.8 °F (36.6 °C) (02/19/20 0604)  Pulse: 94 (02/19/20 0604)  Resp: 16 (02/19/20 0604)  BP: (!) 126/93 (02/19/20 0604)  SpO2: 98 % (02/19/20 0604) Vital Signs (24h Range):  Temp:  [96.4 °F (35.8 °C)-98.2 °F (36.8 °C)] 97.8 °F (36.6 °C)  Pulse:  [] 94  Resp:  [16-18] 16  SpO2:  [97 %-98 %] 98 %  BP: (126-137)/(80-97) 126/93     Weight: 83.6 kg (184 lb 4.9 oz)  Body mass index is 32.65 kg/m².    PM FHT: Cat 1 (reassuring) 130 bpm, moderate BTBV, + accels, - decels  TOCO: No contractions/irritability    No intake or output data in the 24 hours ending 02/19/20 0623    Cervical Exam: deferred      Significant Labs:  Recent Lab Results       02/18/20  0900        Urine Protein, Timed 525     PROTEIN URINE 15     Urine Collection Duration 24     Urine Volume 3500         Physical Exam:   Constitutional: She is oriented to person, place, and time. She appears well-developed and well-nourished. No distress.    HENT:   Head: Normocephalic and atraumatic.    Eyes: Conjunctivae and EOM are normal.    Neck: Normal range of motion. Neck supple. No thyromegaly present.    Cardiovascular: Normal rate and regular rhythm.     Pulmonary/Chest: Effort normal. No respiratory distress.        Abdominal: Soft. There is no tenderness.   Gravid, size appropriate for dates, no fundal tenderness             Musculoskeletal: Normal range of motion and moves all extremeties. She exhibits no edema or tenderness.       Neurological: She is alert and oriented to person, place, and time.    Skin: Skin is warm and dry. No rash noted.    Psychiatric: She has a normal mood and affect. Her behavior is normal. Judgment and thought content normal.

## 2020-02-19 NOTE — CARE UPDATE
MD to beside. Pt reports bright red spotting on toilet paper after wiping and noticed pink tinged fluid on peripad. Denies heavy bleeding, passage of clots. Reports mild lower abdominal cramping but no ctx.    SSE: cervix FT, unchanged from admission; clear amniotic fluid, non malodorous, no active bleeding, scant small bright red blood clot    - reassuring FHT with irregular rare ctx  - no change in cervical exam from admission, very small amount of bleeding on exam    Jackie Kim MD   OBGYN, PGY-2

## 2020-02-19 NOTE — CARE UPDATE
MD to bedside. Pt reports no more episodes of bleeding. Abdominal cramping has resolved. Denies ctx. Pt denies palpitations, chest pain, SOB.    Temp: 97.8  Mild tachycardia: 110s  FHT: 140, mod BTBV, +accels    - continuous fetal monitoring  - will reassess this PM, if continues to be stable will discuss deescalating to TID monitoring and advancing diet    Jackie Kim MD   OBGYN, PGY-2

## 2020-02-20 ENCOUNTER — ANESTHESIA EVENT (OUTPATIENT)
Dept: OBSTETRICS AND GYNECOLOGY | Facility: OTHER | Age: 45
End: 2020-02-20
Payer: COMMERCIAL

## 2020-02-20 ENCOUNTER — ANESTHESIA (OUTPATIENT)
Dept: OBSTETRICS AND GYNECOLOGY | Facility: OTHER | Age: 45
End: 2020-02-20
Payer: COMMERCIAL

## 2020-02-20 PROBLEM — Z3A.32 32 WEEKS GESTATION OF PREGNANCY: Status: ACTIVE | Noted: 2020-02-20

## 2020-02-20 PROBLEM — O14.93 PRE-ECLAMPSIA IN THIRD TRIMESTER: Status: ACTIVE | Noted: 2020-02-10

## 2020-02-20 LAB
ABO + RH BLD: NORMAL
ALLENS TEST: ABNORMAL
ALLENS TEST: ABNORMAL
BASOPHILS # BLD AUTO: 0.06 K/UL (ref 0–0.2)
BASOPHILS NFR BLD: 0.3 % (ref 0–1.9)
BLD GP AB SCN CELLS X3 SERPL QL: NORMAL
DELSYS: ABNORMAL
DELSYS: ABNORMAL
DIFFERENTIAL METHOD: ABNORMAL
EOSINOPHIL # BLD AUTO: 0.3 K/UL (ref 0–0.5)
EOSINOPHIL NFR BLD: 1.5 % (ref 0–8)
ERYTHROCYTE [DISTWIDTH] IN BLOOD BY AUTOMATED COUNT: 13.1 % (ref 11.5–14.5)
HCO3 UR-SCNC: 23.9 MMOL/L (ref 24–28)
HCO3 UR-SCNC: 25.3 MMOL/L (ref 24–28)
HCT VFR BLD AUTO: 41.9 % (ref 37–48.5)
HGB BLD-MCNC: 13.8 G/DL (ref 12–16)
IMM GRANULOCYTES # BLD AUTO: 0.63 K/UL (ref 0–0.04)
IMM GRANULOCYTES NFR BLD AUTO: 3.4 % (ref 0–0.5)
LYMPHOCYTES # BLD AUTO: 1.9 K/UL (ref 1–4.8)
LYMPHOCYTES NFR BLD: 10.1 % (ref 18–48)
MCH RBC QN AUTO: 28.1 PG (ref 27–31)
MCHC RBC AUTO-ENTMCNC: 32.9 G/DL (ref 32–36)
MCV RBC AUTO: 85 FL (ref 82–98)
MONOCYTES # BLD AUTO: 1.3 K/UL (ref 0.3–1)
MONOCYTES NFR BLD: 7.1 % (ref 4–15)
NEUTROPHILS # BLD AUTO: 14.6 K/UL (ref 1.8–7.7)
NEUTROPHILS NFR BLD: 77.6 % (ref 38–73)
NRBC BLD-RTO: 0 /100 WBC
PCO2 BLDA: 45.6 MMHG (ref 35–45)
PCO2 BLDA: 50.8 MMHG (ref 35–45)
PH SMN: 7.3 [PH] (ref 7.35–7.45)
PH SMN: 7.33 [PH] (ref 7.35–7.45)
PLATELET # BLD AUTO: 206 K/UL (ref 150–350)
PMV BLD AUTO: 10.9 FL (ref 9.2–12.9)
PO2 BLDA: 15 MMHG (ref 80–100)
PO2 BLDA: 21 MMHG (ref 80–100)
POC BE: -1 MMOL/L
POC BE: -2 MMOL/L
POC SATURATED O2: 16 % (ref 95–100)
POC SATURATED O2: 30 % (ref 95–100)
RBC # BLD AUTO: 4.91 M/UL (ref 4–5.4)
SAMPLE: ABNORMAL
SAMPLE: ABNORMAL
SITE: ABNORMAL
SITE: ABNORMAL
WBC # BLD AUTO: 18.78 K/UL (ref 3.9–12.7)

## 2020-02-20 PROCEDURE — 99232 SBSQ HOSP IP/OBS MODERATE 35: CPT | Mod: 25,,, | Performed by: OBSTETRICS & GYNECOLOGY

## 2020-02-20 PROCEDURE — 37000009 HC ANESTHESIA EA ADD 15 MINS: Performed by: OBSTETRICS & GYNECOLOGY

## 2020-02-20 PROCEDURE — 59510 CESAREAN DELIVERY: CPT | Mod: ,,, | Performed by: OBSTETRICS & GYNECOLOGY

## 2020-02-20 PROCEDURE — 85025 COMPLETE CBC W/AUTO DIFF WBC: CPT

## 2020-02-20 PROCEDURE — 25000003 PHARM REV CODE 250: Performed by: STUDENT IN AN ORGANIZED HEALTH CARE EDUCATION/TRAINING PROGRAM

## 2020-02-20 PROCEDURE — 59514 CESAREAN DELIVERY ONLY: CPT | Mod: ,,, | Performed by: ANESTHESIOLOGY

## 2020-02-20 PROCEDURE — 99900035 HC TECH TIME PER 15 MIN (STAT)

## 2020-02-20 PROCEDURE — 36004725 HC OB OR TIME LEV III - EA ADD 15 MIN: Performed by: OBSTETRICS & GYNECOLOGY

## 2020-02-20 PROCEDURE — 86850 RBC ANTIBODY SCREEN: CPT

## 2020-02-20 PROCEDURE — 88307 TISSUE EXAM BY PATHOLOGIST: CPT | Mod: 26,,, | Performed by: PATHOLOGY

## 2020-02-20 PROCEDURE — 11000001 HC ACUTE MED/SURG PRIVATE ROOM

## 2020-02-20 PROCEDURE — 82803 BLOOD GASES ANY COMBINATION: CPT

## 2020-02-20 PROCEDURE — 59025 PR FETAL 2N-STRESS TEST: ICD-10-PCS | Mod: 26,59,, | Performed by: OBSTETRICS & GYNECOLOGY

## 2020-02-20 PROCEDURE — 71000033 HC RECOVERY, INTIAL HOUR: Performed by: OBSTETRICS & GYNECOLOGY

## 2020-02-20 PROCEDURE — 97802 MEDICAL NUTRITION INDIV IN: CPT

## 2020-02-20 PROCEDURE — 37000008 HC ANESTHESIA 1ST 15 MINUTES: Performed by: OBSTETRICS & GYNECOLOGY

## 2020-02-20 PROCEDURE — 63600175 PHARM REV CODE 636 W HCPCS: Performed by: STUDENT IN AN ORGANIZED HEALTH CARE EDUCATION/TRAINING PROGRAM

## 2020-02-20 PROCEDURE — 71000039 HC RECOVERY, EACH ADD'L HOUR: Performed by: OBSTETRICS & GYNECOLOGY

## 2020-02-20 PROCEDURE — 36415 COLL VENOUS BLD VENIPUNCTURE: CPT

## 2020-02-20 PROCEDURE — S0028 INJECTION, FAMOTIDINE, 20 MG: HCPCS | Performed by: STUDENT IN AN ORGANIZED HEALTH CARE EDUCATION/TRAINING PROGRAM

## 2020-02-20 PROCEDURE — 36004724 HC OB OR TIME LEV III - 1ST 15 MIN: Performed by: OBSTETRICS & GYNECOLOGY

## 2020-02-20 PROCEDURE — 59514 PRA REAN DELIVERY ONLY: ICD-10-PCS | Mod: ,,, | Performed by: ANESTHESIOLOGY

## 2020-02-20 PROCEDURE — 51702 INSERT TEMP BLADDER CATH: CPT

## 2020-02-20 PROCEDURE — 27200688 HC TRAY, SPINAL-HYPER/ ISOBARIC: Performed by: ANESTHESIOLOGY

## 2020-02-20 PROCEDURE — 99232 PR SUBSEQUENT HOSPITAL CARE,LEVL II: ICD-10-PCS | Mod: 25,,, | Performed by: OBSTETRICS & GYNECOLOGY

## 2020-02-20 PROCEDURE — 59510 PR FULL ROUT OBSTE CARE,CESAREAN DELIV: ICD-10-PCS | Mod: ,,, | Performed by: OBSTETRICS & GYNECOLOGY

## 2020-02-20 PROCEDURE — 88307 PR  SURG PATH,LEVEL V: ICD-10-PCS | Mod: 26,,, | Performed by: PATHOLOGY

## 2020-02-20 PROCEDURE — 59025 FETAL NON-STRESS TEST: CPT | Mod: 26,59,, | Performed by: OBSTETRICS & GYNECOLOGY

## 2020-02-20 PROCEDURE — 88307 TISSUE EXAM BY PATHOLOGIST: CPT | Performed by: PATHOLOGY

## 2020-02-20 RX ORDER — SODIUM CHLORIDE, SODIUM LACTATE, POTASSIUM CHLORIDE, CALCIUM CHLORIDE 600; 310; 30; 20 MG/100ML; MG/100ML; MG/100ML; MG/100ML
INJECTION, SOLUTION INTRAVENOUS CONTINUOUS
Status: DISCONTINUED | OUTPATIENT
Start: 2020-02-20 | End: 2020-02-20

## 2020-02-20 RX ORDER — IBUPROFEN 400 MG/1
800 TABLET ORAL
Status: DISCONTINUED | OUTPATIENT
Start: 2020-02-21 | End: 2020-02-24 | Stop reason: HOSPADM

## 2020-02-20 RX ORDER — ACETAMINOPHEN 500 MG
1000 TABLET ORAL
Status: DISCONTINUED | OUTPATIENT
Start: 2020-02-20 | End: 2020-02-24 | Stop reason: HOSPADM

## 2020-02-20 RX ORDER — NALBUPHINE HYDROCHLORIDE 10 MG/ML
5 INJECTION, SOLUTION INTRAMUSCULAR; INTRAVENOUS; SUBCUTANEOUS ONCE AS NEEDED
Status: DISCONTINUED | OUTPATIENT
Start: 2020-02-20 | End: 2020-02-24 | Stop reason: HOSPADM

## 2020-02-20 RX ORDER — MORPHINE SULFATE 0.5 MG/ML
INJECTION, SOLUTION EPIDURAL; INTRATHECAL; INTRAVENOUS
Status: DISCONTINUED | OUTPATIENT
Start: 2020-02-20 | End: 2020-02-20

## 2020-02-20 RX ORDER — BUPIVACAINE HYDROCHLORIDE 7.5 MG/ML
INJECTION, SOLUTION INTRASPINAL
Status: DISCONTINUED | OUTPATIENT
Start: 2020-02-20 | End: 2020-02-20

## 2020-02-20 RX ORDER — OXYTOCIN/RINGER'S LACTATE 30/500 ML
334 PLASTIC BAG, INJECTION (ML) INTRAVENOUS ONCE
Status: COMPLETED | OUTPATIENT
Start: 2020-02-20 | End: 2020-02-20

## 2020-02-20 RX ORDER — ACETAMINOPHEN 325 MG/1
650 TABLET ORAL
Status: DISCONTINUED | OUTPATIENT
Start: 2020-02-20 | End: 2020-02-20

## 2020-02-20 RX ORDER — DIPHENHYDRAMINE HCL 25 MG
25 CAPSULE ORAL EVERY 6 HOURS PRN
Status: DISCONTINUED | OUTPATIENT
Start: 2020-02-20 | End: 2020-02-24 | Stop reason: HOSPADM

## 2020-02-20 RX ORDER — CARBOPROST TROMETHAMINE 250 UG/ML
250 INJECTION, SOLUTION INTRAMUSCULAR
Status: DISCONTINUED | OUTPATIENT
Start: 2020-02-20 | End: 2020-02-24 | Stop reason: HOSPADM

## 2020-02-20 RX ORDER — PROCHLORPERAZINE EDISYLATE 5 MG/ML
5 INJECTION INTRAMUSCULAR; INTRAVENOUS ONCE
Status: DISCONTINUED | OUTPATIENT
Start: 2020-02-20 | End: 2020-02-20

## 2020-02-20 RX ORDER — AMOXICILLIN 250 MG/1
500 CAPSULE ORAL EVERY 8 HOURS
Status: DISCONTINUED | OUTPATIENT
Start: 2020-02-20 | End: 2020-02-20

## 2020-02-20 RX ORDER — ACETAMINOPHEN 10 MG/ML
INJECTION, SOLUTION INTRAVENOUS
Status: DISCONTINUED | OUTPATIENT
Start: 2020-02-20 | End: 2020-02-20

## 2020-02-20 RX ORDER — FAMOTIDINE 10 MG/ML
20 INJECTION INTRAVENOUS
Status: COMPLETED | OUTPATIENT
Start: 2020-02-20 | End: 2020-02-20

## 2020-02-20 RX ORDER — OXYCODONE HYDROCHLORIDE 5 MG/1
10 TABLET ORAL EVERY 4 HOURS PRN
Status: DISCONTINUED | OUTPATIENT
Start: 2020-02-20 | End: 2020-02-20

## 2020-02-20 RX ORDER — MISOPROSTOL 200 UG/1
800 TABLET ORAL ONCE AS NEEDED
Status: DISCONTINUED | OUTPATIENT
Start: 2020-02-20 | End: 2020-02-24 | Stop reason: HOSPADM

## 2020-02-20 RX ORDER — DIPHENHYDRAMINE HYDROCHLORIDE 50 MG/ML
12.5 INJECTION INTRAMUSCULAR; INTRAVENOUS
Status: DISCONTINUED | OUTPATIENT
Start: 2020-02-20 | End: 2020-02-24 | Stop reason: HOSPADM

## 2020-02-20 RX ORDER — ONDANSETRON 8 MG/1
8 TABLET, ORALLY DISINTEGRATING ORAL EVERY 8 HOURS PRN
Status: DISCONTINUED | OUTPATIENT
Start: 2020-02-20 | End: 2020-02-24 | Stop reason: HOSPADM

## 2020-02-20 RX ORDER — OXYCODONE AND ACETAMINOPHEN 5; 325 MG/1; MG/1
1 TABLET ORAL EVERY 4 HOURS PRN
Status: DISCONTINUED | OUTPATIENT
Start: 2020-02-20 | End: 2020-02-24 | Stop reason: HOSPADM

## 2020-02-20 RX ORDER — DEXAMETHASONE SODIUM PHOSPHATE 4 MG/ML
INJECTION, SOLUTION INTRA-ARTICULAR; INTRALESIONAL; INTRAMUSCULAR; INTRAVENOUS; SOFT TISSUE
Status: DISCONTINUED | OUTPATIENT
Start: 2020-02-20 | End: 2020-02-20

## 2020-02-20 RX ORDER — OXYTOCIN 10 [USP'U]/ML
INJECTION, SOLUTION INTRAMUSCULAR; INTRAVENOUS
Status: DISCONTINUED | OUTPATIENT
Start: 2020-02-20 | End: 2020-02-20

## 2020-02-20 RX ORDER — AMOXICILLIN 250 MG
1 CAPSULE ORAL NIGHTLY PRN
Status: DISCONTINUED | OUTPATIENT
Start: 2020-02-20 | End: 2020-02-24 | Stop reason: HOSPADM

## 2020-02-20 RX ORDER — SODIUM CITRATE AND CITRIC ACID MONOHYDRATE 334; 500 MG/5ML; MG/5ML
30 SOLUTION ORAL
Status: COMPLETED | OUTPATIENT
Start: 2020-02-20 | End: 2020-02-20

## 2020-02-20 RX ORDER — OXYTOCIN/RINGER'S LACTATE 30/500 ML
95 PLASTIC BAG, INJECTION (ML) INTRAVENOUS ONCE
Status: COMPLETED | OUTPATIENT
Start: 2020-02-20 | End: 2020-02-20

## 2020-02-20 RX ORDER — FENTANYL CITRATE 50 UG/ML
INJECTION, SOLUTION INTRAMUSCULAR; INTRAVENOUS
Status: DISCONTINUED | OUTPATIENT
Start: 2020-02-20 | End: 2020-02-20

## 2020-02-20 RX ORDER — ONDANSETRON 2 MG/ML
4 INJECTION INTRAMUSCULAR; INTRAVENOUS EVERY 6 HOURS PRN
Status: DISCONTINUED | OUTPATIENT
Start: 2020-02-20 | End: 2020-02-24 | Stop reason: HOSPADM

## 2020-02-20 RX ORDER — CEFAZOLIN SODIUM 2 G/50ML
2 SOLUTION INTRAVENOUS
Status: DISCONTINUED | OUTPATIENT
Start: 2020-02-20 | End: 2020-02-24 | Stop reason: HOSPADM

## 2020-02-20 RX ORDER — SIMETHICONE 80 MG
1 TABLET,CHEWABLE ORAL EVERY 6 HOURS PRN
Status: DISCONTINUED | OUTPATIENT
Start: 2020-02-20 | End: 2020-02-24 | Stop reason: HOSPADM

## 2020-02-20 RX ORDER — SODIUM CHLORIDE, SODIUM LACTATE, POTASSIUM CHLORIDE, CALCIUM CHLORIDE 600; 310; 30; 20 MG/100ML; MG/100ML; MG/100ML; MG/100ML
INJECTION, SOLUTION INTRAVENOUS CONTINUOUS PRN
Status: DISCONTINUED | OUTPATIENT
Start: 2020-02-20 | End: 2020-02-20

## 2020-02-20 RX ORDER — DOCUSATE SODIUM 100 MG/1
200 CAPSULE, LIQUID FILLED ORAL 2 TIMES DAILY
Status: DISCONTINUED | OUTPATIENT
Start: 2020-02-20 | End: 2020-02-24 | Stop reason: HOSPADM

## 2020-02-20 RX ORDER — PHENYLEPHRINE HYDROCHLORIDE 10 MG/ML
INJECTION INTRAVENOUS
Status: DISCONTINUED | OUTPATIENT
Start: 2020-02-20 | End: 2020-02-20

## 2020-02-20 RX ORDER — AZITHROMYCIN 100 MG/ML
INJECTION, POWDER, LYOPHILIZED, FOR SOLUTION INTRAVENOUS
Status: DISCONTINUED | OUTPATIENT
Start: 2020-02-20 | End: 2020-02-20

## 2020-02-20 RX ORDER — KETOROLAC TROMETHAMINE 30 MG/ML
30 INJECTION, SOLUTION INTRAMUSCULAR; INTRAVENOUS
Status: COMPLETED | OUTPATIENT
Start: 2020-02-20 | End: 2020-02-21

## 2020-02-20 RX ORDER — PRENATAL WITH FERROUS FUM AND FOLIC ACID 3080; 920; 120; 400; 22; 1.84; 3; 20; 10; 1; 12; 200; 27; 25; 2 [IU]/1; [IU]/1; MG/1; [IU]/1; MG/1; MG/1; MG/1; MG/1; MG/1; MG/1; UG/1; MG/1; MG/1; MG/1; MG/1
1 TABLET ORAL DAILY
Status: DISCONTINUED | OUTPATIENT
Start: 2020-02-20 | End: 2020-02-24 | Stop reason: HOSPADM

## 2020-02-20 RX ORDER — OXYCODONE HYDROCHLORIDE 5 MG/1
5 TABLET ORAL EVERY 4 HOURS PRN
Status: DISCONTINUED | OUTPATIENT
Start: 2020-02-20 | End: 2020-02-20

## 2020-02-20 RX ORDER — ONDANSETRON HYDROCHLORIDE 2 MG/ML
INJECTION, SOLUTION INTRAMUSCULAR; INTRAVENOUS
Status: DISCONTINUED | OUTPATIENT
Start: 2020-02-20 | End: 2020-02-20

## 2020-02-20 RX ORDER — METHYLERGONOVINE MALEATE 0.2 MG/ML
200 INJECTION INTRAVENOUS ONCE AS NEEDED
Status: DISCONTINUED | OUTPATIENT
Start: 2020-02-20 | End: 2020-02-24 | Stop reason: HOSPADM

## 2020-02-20 RX ORDER — PROCHLORPERAZINE MALEATE 5 MG
5 TABLET ORAL ONCE
Status: COMPLETED | OUTPATIENT
Start: 2020-02-20 | End: 2020-02-20

## 2020-02-20 RX ORDER — HYDROCORTISONE 25 MG/G
CREAM TOPICAL 3 TIMES DAILY PRN
Status: DISCONTINUED | OUTPATIENT
Start: 2020-02-20 | End: 2020-02-24 | Stop reason: HOSPADM

## 2020-02-20 RX ADMIN — DEXAMETHASONE SODIUM PHOSPHATE 4 MG: 4 INJECTION, SOLUTION INTRAMUSCULAR; INTRAVENOUS at 09:02

## 2020-02-20 RX ADMIN — AZITHROMYCIN MONOHYDRATE 500 MG: 500 INJECTION, POWDER, LYOPHILIZED, FOR SOLUTION INTRAVENOUS at 09:02

## 2020-02-20 RX ADMIN — ACETAMINOPHEN 650 MG: 325 TABLET ORAL at 03:02

## 2020-02-20 RX ADMIN — OXYCODONE HYDROCHLORIDE 5 MG: 5 TABLET ORAL at 01:02

## 2020-02-20 RX ADMIN — PHENYLEPHRINE HYDROCHLORIDE 200 MCG: 10 INJECTION INTRAVENOUS at 10:02

## 2020-02-20 RX ADMIN — ONDANSETRON 8 MG: 8 TABLET, ORALLY DISINTEGRATING ORAL at 04:02

## 2020-02-20 RX ADMIN — PHENYLEPHRINE HYDROCHLORIDE 50 MCG/MIN: 10 INJECTION INTRAVENOUS at 09:02

## 2020-02-20 RX ADMIN — VALACYCLOVIR HYDROCHLORIDE 500 MG: 500 TABLET, FILM COATED ORAL at 09:02

## 2020-02-20 RX ADMIN — OXYTOCIN 3 UNITS: 10 INJECTION, SOLUTION INTRAMUSCULAR; INTRAVENOUS at 10:02

## 2020-02-20 RX ADMIN — Medication 95 MILLI-UNITS/MIN: at 11:02

## 2020-02-20 RX ADMIN — OXYCODONE HYDROCHLORIDE AND ACETAMINOPHEN 1 TABLET: 5; 325 TABLET ORAL at 09:02

## 2020-02-20 RX ADMIN — Medication 334 MILLI-UNITS/MIN: at 10:02

## 2020-02-20 RX ADMIN — PROCHLORPERAZINE MALEATE 5 MG: 5 TABLET ORAL at 07:02

## 2020-02-20 RX ADMIN — FENTANYL CITRATE 10 MCG: 50 INJECTION, SOLUTION INTRAMUSCULAR; INTRAVENOUS at 09:02

## 2020-02-20 RX ADMIN — KETOROLAC TROMETHAMINE 30 MG: 30 INJECTION, SOLUTION INTRAMUSCULAR at 04:02

## 2020-02-20 RX ADMIN — AMOXICILLIN 500 MG: 250 CAPSULE ORAL at 07:02

## 2020-02-20 RX ADMIN — FAMOTIDINE 20 MG: 10 INJECTION, SOLUTION INTRAVENOUS at 09:02

## 2020-02-20 RX ADMIN — DOCUSATE SODIUM 200 MG: 100 CAPSULE, LIQUID FILLED ORAL at 09:02

## 2020-02-20 RX ADMIN — KETOROLAC TROMETHAMINE 30 MG: 30 INJECTION, SOLUTION INTRAMUSCULAR at 12:02

## 2020-02-20 RX ADMIN — SODIUM CITRATE AND CITRIC ACID MONOHYDRATE 15 ML: 500; 334 SOLUTION ORAL at 09:02

## 2020-02-20 RX ADMIN — VALACYCLOVIR HYDROCHLORIDE 500 MG: 500 TABLET, FILM COATED ORAL at 12:02

## 2020-02-20 RX ADMIN — Medication 0.1 MG: at 09:02

## 2020-02-20 RX ADMIN — DOCUSATE SODIUM 200 MG: 100 CAPSULE, LIQUID FILLED ORAL at 12:02

## 2020-02-20 RX ADMIN — FENTANYL CITRATE 15 MCG: 50 INJECTION, SOLUTION INTRAMUSCULAR; INTRAVENOUS at 10:02

## 2020-02-20 RX ADMIN — BUPIVACAINE HYDROCHLORIDE IN DEXTROSE 1.6 ML: 7.5 INJECTION, SOLUTION SUBARACHNOID at 09:02

## 2020-02-20 RX ADMIN — SODIUM CHLORIDE 2 G: 9 INJECTION, SOLUTION INTRAVENOUS at 09:02

## 2020-02-20 RX ADMIN — ACETAMINOPHEN 1000 MG: 10 INJECTION, SOLUTION INTRAVENOUS at 09:02

## 2020-02-20 RX ADMIN — SODIUM CHLORIDE, SODIUM LACTATE, POTASSIUM CHLORIDE, AND CALCIUM CHLORIDE: 600; 310; 30; 20 INJECTION, SOLUTION INTRAVENOUS at 09:02

## 2020-02-20 RX ADMIN — ONDANSETRON 4 MG: 2 INJECTION, SOLUTION INTRAMUSCULAR; INTRAVENOUS at 09:02

## 2020-02-20 NOTE — PLAN OF CARE
Intervention: General/healthful diet.    Recommendations    1. When medically able continue Regular diet.     Goals: 1. >85% of EEN, EPN by RD follow up.  Nutrition Goal Status: new

## 2020-02-20 NOTE — L&D DELIVERY NOTE
Section Procedure Note    Procedure:   1. Primary  Section via pfannensteil skin incision    Indications:   1. non-reassuring fetal status, pt remote from delivery    Pre-operative Diagnosis:   1. IUP at 33 week 3 day pregnancy  2. PPROM  3. preE w/o SF  4. Vanishing twin pregnancy  5. H/o poor pregnancy outcomes  6. Anxiety/depression    Post-operative Diagnosis:   same    Surgeon: Siobhan Burch MD     Assistants: Jackie Kim MD - PGY2    Anesthesia: Spinal anesthesia    Findings:    1. Single viable  male infant, with APGARS 8/9, weight 1820g.   2. Normal appearing uterus, ovaries, and fallopian tubes.  3. Placenta mildly adherent to uterine fundus    Qualitative Blood Loss:  205 mL           Total IV Fluids: 1700 mL     UOP: 200 mL    Specimens: placenta    PreOp CBC:   Lab Results   Component Value Date    WBC 18.78 (H) 2020    HGB 13.8 2020    HCT 41.9 2020    MCV 85 2020     2020                     Complications:  None; patient tolerated the procedure well.           Disposition: PACU - hemodynamically stable.           Condition: stable    Procedure Details   The patient was seen in the Holding Room. The risks, benefits, complications, treatment options, and expected outcomes were discussed with the patient.  The patient concurred with the proposed plan, giving informed consent.  The patient was taken to Operating Room, identified as Marisela Siddiqui and the procedure verified as  Delivery. A Time Out was held and the above information confirmed.    After induction of anesthesia, the patient was prepped and draped in the usual sterile manner while placed in a dorsal supine position with a left lateral tilt.  A schwartz catheter was also placed per nursing. Preoperative antibiotics Ancef 2 g and azithromycin 500 mg were administered. An allis test was performed confirming adequate anesthesia.  A Pfannenstiel incision was made and carried  down through the subcutaneous tissue to the fascia. Fascial incision was made and extended transversely. The fascia was grasped with [facility] clamps and  from the underlying rectus tissue superiorly and inferiorly. The peritoneum was identified, found to be free of adherent bowel, and entered sharply. Peritoneal incision was extended longitudinally. The vesico-uterine peritoneum was identified, and bladder blade was inserted.  The lower uterine segment was noted to be very thin. A low transverse uterine incision was carefully made, using allis clamps to elevate the uterine tissue away from the fetal head. The incision was then extended with finger fracture. The infant was noted to be in cephalic presentation. The head was brought to the incision and elevated out of the pelvis. The patient delivered a single viable male infant without difficulty.  Infant weighed 1820 grams with Apgar scores of 8/9 at one and five minutes respectively. After the umbilical cord was clamped and cut, cord blood was obtained for evaluation. The placenta was mildly adherent to the uterine fundus and was removed in pieces. Ring forceps were use to remove the adherent tissue and a thorough uterine sweep was performed confirming all tissue had been evacuated. Good uterine tone was noted with very minimal bleeding.  The uterine incision was closed with running locked sutures of #1 chromic. Hemostasis was achieved with a imbricating suture of #1 chromic.. The uterine outline, tubes and ovaries appeared normal. The uterus was returned to the abdominal cavity. Incision was reinspected and good hemostasis was noted. The abdominal cavity was cleaned with a moistened lap. The fascia was then reapproximated with running sutures of #1 PDS. The skin was reapproximated with 4-0 monocryl.    Instrument, sponge, and needle counts were correct prior the abdominal closure and at the conclusion of the case.     Pt tolerated procedure well and was in  stable condition after the procedure.      Jackie Kim MD   OBGYN, PGY-2       Delivery Information for Adelfo Siddiqui    Birth information:  YOB: 2020   Time of birth: 10:08 AM   Sex: male   Head Delivery Date/Time: 2020 10:08 AM   Delivery type: , Low Transverse   Gestational Age: 33w3d    Delivery Providers    Delivering clinician:  Siobhan Burch MD   Provider Role    Jackie Kim MD Resident    Indigo Hoover, RN Delivery Nurse    Sonia Joens, [facility] Surgical Tech            Measurements    Weight:  1820 g  Length:           Apgars    Living status:  Living  Apgars:   1 min.:   5 min.:   10 min.:   15 min.:   20 min.:     Skin color:   0  1       Heart rate:   2  2       Reflex irritability:   2  2       Muscle tone:   2  2       Respiratory effort:   2  2       Total:   8  9       Apgars assigned by:  NICU         Operative Delivery    Forceps attempted?:  No  Vacuum extractor attempted?:  No         Shoulder Dystocia    Shoulder dystocia present?:  No           Presentation    Presentation:  Vertex           Interventions/Resuscitation    Method:  NICU Attended       Cord    Vessels:  3 vessels  Complications:  None  Delayed Cord Clamping?:  No  Cord Clamped Date/Time:  2020 10:08 AM  Cord Blood Disposition:  Sent with Baby  Gases Sent?:  Yes  Stem Cell Collection (by MD):  No       Placenta    Placenta delivery date/time:  2020 1009  Placenta removal:  Manual removal  Placenta appearance:  Intact  Placenta disposition:  pathology           Labor Events:       labor: Yes     Labor Onset Date/Time:         Dilation Complete Date/Time:         Start Pushing Date/Time:       Rupture Date/Time: 02/10/20  1800         Rupture type:           Fluid Amount:        Fluid Color:        Fluid Odor:        Membrane Status (PeriCalm):        Rupture Date/Time (PeriCalm):        Fluid Amount (PeriCalm):        Fluid Color (PeriCalm):          steroids: Full Course     Antibiotics given for GBS: No     Induction:       Indications for induction:        Augmentation:       Indications for augmentation:       Labor complications: None     Additional complications:          Cervical ripening:                     Delivery:      Episiotomy: None     Indication for Episiotomy:       Perineal Lacerations: None Repaired:      Periurethral Laceration:   Repaired:     Labial Laceration:   Repaired:     Sulcus Laceration:   Repaired:     Vaginal Laceration:   Repaired:     Cervical Laceration:   Repaired:     Repair suture:       Repair # of packets: 7     Last Value - EBL - Nursing (mL): 0     Sum - EBL - Nursing (mL): 0     Last Value - EBL - Anesthesia (mL):      Calculated QBL (mL): 205      Vaginal Sweep Performed: No     Surgicount Correct: Yes       Other providers:       Anesthesia    Method:  Spinal          Details (if applicable):  Trial of Labor No    Categorization: Primary    Priority: Routine   Indications for : Fetal Intolerance of Labor   Incision Type: low transverse     Additional  information:  Forceps:    Vacuum:    Breech:    Observed anomalies    Other (Comments):

## 2020-02-20 NOTE — ASSESSMENT & PLAN NOTE
-- cervix FT/th on admission  -- s/p BMZ for fetal lung maturity  -- PPROM antibiotics D#10   - s/p Azithromycin 1 g PO D#1, 5   - s/p Ampicillin 2 g IV q 6 hours for 48 hours   - Amoxicillin 500 mg PO BID days #3-10  -- continuous/clears due to variable decels, will reassess this AM  -- Fetus is vertex as of 2/10  -- Most recent growth scan on 2/5 with EFW in the 41%

## 2020-02-20 NOTE — ANESTHESIA PREPROCEDURE EVALUATION
[facility]  Anesthesia Pre-Operative Evaluation         Patient Name: Marisela Siddiqui  YOB: 1975  MRN: [id number]    2020      Marisela Siddiqui is a 44 y.o. female  @ 33w3d who is admitted to antepartum with PPROM. Reports good fetal movement. 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 Today, patient has been having multiple variable decelerations. To OR for primary .    Patient has a history of spinal fusion although she states she does not know the levels. She claims it was a 2 level surgery that took place in [facility] 2016 with a Dr. Sosa in neurosurgery. Upon back examination, appears to have a surgical scar over L1 to L5.      OB History    Para Term  AB Living   5 0 0   4 0   SAB TAB Ectopic Multiple Live Births     1            # Outcome Date GA Lbr Alfredo/2nd Weight Sex Delivery Anes PTL Lv   5 Current            4 TAB 2018     TAB         Birth Comments: Trisomy 18   3 AB            2 AB            1 AB                Review of patient's allergies indicates:   Allergen Reactions    Sulfa (sulfonamide antibiotics) Swelling       Wt Readings from Last 1 Encounters:   20 1957 83.6 kg (184 lb 4.9 oz)   20 1100 83.6 kg (184 lb 4.9 oz)       BP Readings from Last 3 Encounters:   20 122/79   20 126/80   20 124/86       Patient Active Problem List   Diagnosis    Seborrheic dermatitis of scalp    Post partum depression    Recurrent oral herpes simplex    Attention deficit hyperactivity disorder (ADHD)    Vanishing twin syndrome     premature rupture of membranes (PPROM)    History of poor pregnancy outcome (T18 x2)    Gestational hypertension, third trimester    32 weeks gestation of pregnancy       Past Surgical History:   Procedure Laterality Date    ANTERIOR CRUCIATE LIGAMENT REPAIR Right 2012    DILATION AND  CURETTAGE OF UTERUS      DILATION AND EVACUATION  12/18/2018    oocyte retrievals      SPINAL FUSION  2013    thoracic? due to burst fracture of vertebrae       Social History     Socioeconomic History    Marital status:      Spouse name: Not on file    Number of children: Not on file    Years of education: Not on file    Highest education level: Not on file   Occupational History     Comment: Microbiologist,     Social Needs    Financial resource strain: Not on file    Food insecurity:     Worry: Not on file     Inability: Not on file    Transportation needs:     Medical: Not on file     Non-medical: Not on file   Tobacco Use    Smoking status: Never Smoker    Smokeless tobacco: Never Used   Substance and Sexual Activity    Alcohol use: Not Currently     Frequency: 2-3 times a week     Drinks per session: 3 or 4    Drug use: No    Sexual activity: Yes     Partners: Male     Birth control/protection: None   Lifestyle    Physical activity:     Days per week: Not on file     Minutes per session: Not on file    Stress: Not on file   Relationships    Social connections:     Talks on phone: Not on file     Gets together: Not on file     Attends Caodaism service: Not on file     Active member of club or organization: Not on file     Attends meetings of clubs or organizations: Not on file     Relationship status: Not on file   Other Topics Concern    Not on file   Social History Narrative    Not on file         Chemistry        Component Value Date/Time     02/16/2020 0510    K 3.9 02/16/2020 0510     02/16/2020 0510    CO2 20 (L) 02/16/2020 0510    BUN 13 02/16/2020 0510    CREATININE 0.6 02/16/2020 0510    GLU 87 02/16/2020 0510        Component Value Date/Time    CALCIUM 9.0 02/16/2020 0510    ALKPHOS 79 02/16/2020 0510    AST 20 02/16/2020 0510    ALT 24 02/16/2020 0510    BILITOT 0.4 02/16/2020 0510    ESTGFRAFRICA >60 02/16/2020 0510    EGFRNONAA >60  2020 0510            Lab Results   Component Value Date    WBC 13.90 (H) 2020    HGB 12.5 2020    HCT 37.8 2020    MCV 85 2020     2020       No results for input(s): PT, INR, PROTIME, APTT in the last 72 hours.      20: Records obtained via patient.     XRAY LUMBOSACRAL SPINE AP AND  LATERAL     Status: Final result   Connect: Released on 2014 9:17 AM     LUMBOSACRAL SPINE AP AND LAT  Ordered: 2013 10:50 AM PATIENT NAME: RINKU DIAL  Location: [id number] MRN: [id number]   Age: 37 yrs Sex: F  Adm M.D.: BAYRON JON MD : 1975  Exam Date: Accession #: Exam Code: Order MD:  2013 *[id number] DXBRII HICKEY MD, PHD  Clinical statement: Status post T12-L2 fusion.  Technique: 2 views of the lumbosacral spine.  Comparison: X-ray of the lumbosacral spine dated April 15, 2013  Findings:  Status post T12-L2 posterior fusion with paired, vertical rods and  bilateral pedicle screws at T12 and L2. No evidence of hardware fracture  or malalignment. Interval improvement in moderate compression deformity  of the L1 vertebral body with decreased retropulsion posteriorly. There  is mild disc space height loss at T12-L1. The spinal alignment is  maintained without evidence of spondylolisthesis. No acute fracture is  identified. The SI joints are unremarkable.  Impression:  1. Status post T12-L2 posterior fusion with satisfactory postoperative  appearance.  2. Interval improvement in moderate compression deformity of L1  vertebral body decreased posterior retropulsion fracture fragments.   Prepared By: Eryn Winkler MD   Study interpreted and report approved by: Luis Armando Farias MD   Electronically signed Diagnostic Report Imaging Report   2013 10:37 AM - 2013 03:49 PM    MRI LUMBAR SPINE WO CONTRAST    Status: Final result   Connect: Released on 2014 9:17 AM     MRI LUMBAR SPINE WO CONTRAST  Ordered: 2014  03:30 PM PATIENT NAME: RINKU DIAL  Location: 55R MRN: [id number]   Age: 38 yrs Sex: F  Adm M.D.: MORGAN CROWDER MD : 1975  Exam Date: Accession #: Exam Code: Trina MD:  2014 *[id number] MRLSPWO MORGAN CROWDER MD  Clinical History: Back pain, also evaluate right iliac soft tissue  lipoma  Technique: MRI of the Lumbar Spine: A noncontrast study was performed  with sagittal T1, STIR, T2 and axial T2 sequences.  Comparison: Prior lumbar plain films dated 2013  Findings: There is a chronic compression fracture of the L1 vertebral  body with a mild posterior superior corner retropulsion without cord  compression. There has been a fusion above and below this level with  rods and pedicle screws, presumably this is a posttraumatic compression  fracture. There has been no interval change compared to the 2013 film.   The vertebral bodies have otherwise maintained their normal height and  signal. The disc spaces are intact and have maintained their normal  volume and signal. There is no evidence of canal or foraminal stenosis  from L1-2 through to the L5-S1 level. There is early bilateral facet  joint osteoarthropathy at L4-5 and at L5-S1 again without canal or  foraminal stenosis.  The conus medullaris and cauda equina are of normal size and signal.  There is a partially visualized lipoma over the right lower back and the  iliac crest.  Impression: Stable compression fracture of L1 with a posterior fusion  from T12-L2.  No evidence of degenerative disc disease, no canal or foraminal  Stenosis.    Partially visualized right lower back lipoma.       Anesthesia Evaluation    I have reviewed the Patient Summary Reports.    I have reviewed the Nursing Notes.   I have reviewed the Medications.     Review of Systems  Anesthesia Hx:  No problems with previous Anesthesia  History of prior surgery of interest to airway management or planning:  Denies Personal Hx of Anesthesia  complications.   Hematology/Oncology:  Hematology Normal   Oncology Normal     EENT/Dental:EENT/Dental Normal   Cardiovascular:  Cardiovascular Normal     Pulmonary:  Pulmonary Normal    Renal/:  Renal/ Normal     Hepatic/GI:  Hepatic/GI Normal    Musculoskeletal:  Spine Disorders: lumbar    Neurological:  Neurology Normal    Endocrine:  Endocrine Normal        Physical Exam  General:  Well nourished    Airway/Jaw/Neck:  Airway Findings: Mouth Opening: Normal Tongue: Normal  General Airway Assessment: Adult  Mallampati: II  TM Distance: 4 - 6 cm  Jaw/Neck Findings:  Neck ROM: Normal ROM     Eyes/Ears/Nose:  EYES/EARS/NOSE FINDINGS: Normal   Dental:  Dental Findings: In tact   Chest/Lungs:  Chest/Lungs Clear    Heart/Vascular:  Heart Findings: Normal     Musculoskeletal:  Musculoskeletal Findings: (Surgical scar over L1-L5)     Mental Status:  Mental Status Findings:  Cooperative, Alert and Oriented         Anesthesia Plan  Type of Anesthesia, risks & benefits discussed:  Anesthesia Type:  CSE, epidural, general, spinal  Patient's Preference:   Intra-op Monitoring Plan: standard ASA monitors  Intra-op Monitoring Plan Comments:   Post Op Pain Control Plan: multimodal analgesia, epidural analgesia, intrathecal opioid and per primary service following discharge from PACU  Post Op Pain Control Plan Comments:   Induction:   IV  Beta Blocker:  Patient is not currently on a Beta-Blocker (No further documentation required).       Informed Consent: Patient understands risks and agrees with Anesthesia plan.  Questions answered. Anesthesia consent signed with patient.  ASA Score: 2     Day of Surgery Review of History & Physical:    H&P update referred to the provider.         Ready For Surgery From Anesthesia Perspective.

## 2020-02-20 NOTE — LACTATION NOTE
Azadihony pump, tubing, collections containers and labels brought to bedside.  Discussed proper pump setting of initiation phase.  Instructed on proper usage of pump and to adjust suction according to maximum comfort level.  Verified appropriate flange fit.  Educated on the frequency and duration of pumping in order to promote and maintain a full milk supply.  Hands on pumping technique reviewed.  Encouraged hand expression after pumping.  Instructed on cleaning of breast pump parts.  Written instructions also given.  Pt verbalized understanding and appropriate recall for proper milk handling, collection, labeling, storage and transportation.

## 2020-02-20 NOTE — TRANSFER OF CARE
"Anesthesia Transfer of Care Note    Patient: Marisela Siddiqui    Procedure(s) Performed: Procedure(s) (LRB):   SECTION (N/A)    Patient location: Labor and Delivery    Anesthesia Type: spinal    Transport from OR: Transported from OR on room air with adequate spontaneous ventilation    Post pain: adequate analgesia    Post assessment: no apparent anesthetic complications and tolerated procedure well    Post vital signs: stable    Level of consciousness: awake, alert and oriented    Nausea/Vomiting: no nausea/vomiting    Complications: none    Transfer of care protocol was followed      Last vitals:   Visit Vitals  /84   Pulse 94   Temp 36.6 °C (97.9 °F) (Temporal)   Resp 16   Ht 5' 3" (1.6 m)   Wt 83.6 kg (184 lb 4.9 oz)   LMP 2019 (Exact Date)   SpO2 97%   Breastfeeding? No   BMI 32.65 kg/m²     "

## 2020-02-20 NOTE — PLAN OF CARE
Patient reported one incident of passing small amount of blood vaginally c minimal cramping; placed on continuous monitoring most of shift--no more bleeding this shift; patient rpoerts + fetal movement, denies increased pain; tracing reactive; minimal irregular contractions noted most of shift; tracing reviewed per  and Julio and off continuous--return to NST TID

## 2020-02-20 NOTE — PLAN OF CARE
"Plan of care reviewed with pt and family. Pt remains free from falls or injuries. Pt reported some cramping at 2115 tonight while on EFM during last scheduled NST of the day. Pt started having recurrent variable decels on EFM. Dr. Burch at . and was ordered to stay on CEFM by MD. Pt calm and stated "she would like to rest before any decisions had to be made." Pt reports positive fetal movement, continuous leakage of fluid, some VB noted on 2/19 at noon, some cramping also reported throughout the day. Pt remains afebrile. Vital signs stable. No distress noted. Will continue to monitor.     "

## 2020-02-20 NOTE — ANESTHESIA PROCEDURE NOTES
Spinal    Diagnosis: IUP, PPROM  Patient location during procedure: OR  Start time: 2/20/2020 9:40 AM  Timeout: 2/20/2020 9:39 AM  End time: 2/20/2020 9:45 AM    Staffing  Authorizing Provider: Fernando Treviño MD  Performing Provider: Wellington Tyson MD    Preanesthetic Checklist  Completed: patient identified, surgical consent, pre-op evaluation, timeout performed, IV checked, risks and benefits discussed and monitors and equipment checked  Spinal Block  Patient position: sitting  Prep: ChloraPrep  Patient monitoring: heart rate, continuous pulse ox and frequent blood pressure checks  Approach: midline  Location: L4-5  Injection technique: single shot  CSF Fluid: clear free-flowing CSF  Needle  Needle type: pencil-tip   Needle gauge: 25 G  Needle length: 3.5 in  Additional Documentation: incremental injection, negative aspiration for heme and no paresthesia on injection  Needle localization: anatomical landmarks  Assessment  Sensory level: T4   Dermatomal levels determined by pinch or prick  Ease of block: easy  Patient's tolerance of the procedure: comfortable throughout block

## 2020-02-20 NOTE — PLAN OF CARE
Based on parents stated goals, POC for today is to double pump 8 or more times in 24 hour period, using proper hand hygiene, setup, storage, transport, and cleaning. Pt to use techniques to maximize milk production, pump at baby bedside, and smell used baby blankets. Pt to document pumpings in pump log. Pt to stay hydrated, eat well, and rest as needed. PT verbalized understanding and questions answered.

## 2020-02-20 NOTE — PROGRESS NOTES
"[facility]  Adult Nutrition  Progress Note    SUMMARY     Intervention: General/healthful diet.    Recommendations    1. When medically able continue Regular diet.     Goals: 1. >85% of EEN, EPN by RD follow up.  Nutrition Goal Status: new    Reason for Assessment    Reason For Assessment: length of stay  Diagnosis: pregnancy complications( premature rupture of membranes )  Relevant Medical History: No previous medical history  Interdisciplinary Rounds: did not attend  General Information Comments:   20- Pt is a  44 y.o. female @ 33w3d who presents for leakage of fluid. Pt at LOS day 10. Pt unable to be seen today due to pt in OR for C- section. Pt currently NPO, advance diet to regular after procedure. NFPE to be completed on follow up and along with general healthy diet education. If pt to breastfeed add an addition 400 kcals to kcal needs.   Nutrition Discharge Planning: Adequate nutrition to meet needs via Regular diet.     Nutrition Risk Screen    Nutrition Risk Screen: no indicators present    Nutrition/Diet History    Spiritual, Cultural Beliefs, Worship Practices, Values that Affect Care: no    Anthropometrics    Height Method: Measured  Height: 5' 3" (160 cm)  Height (inches): 63 in  Weight Method: Standard Scale  Weight: 83.6 kg (184 lb 4.9 oz)  Weight (lb): 184.31 lb  Ideal Body Weight (IBW), Female: 115 lb  % Ideal Body Weight, Female (lb): 160.27 %  BMI (Calculated): 32.7  BMI Grade: 30 - 34.9- obesity - grade I    Lab/Procedures/Meds    Pertinent Labs Reviewed: reviewed  Pertinent Labs Comments: WDL  Pertinent Medications Reviewed: reviewed  Pertinent Medications Comments: Prenatal vitamins    Estimated/Assessed Needs    Weight Used For Calorie Calculations: 83.6 kg (184 lb 4.9 oz)  Energy Calorie Requirements (kcal): 1746 kcals/day( x 1.2)  Energy Need Method: [place]  Protein Requirements: 1mL/kcal or per mD(0.8-1.0 g/kg)  Weight Used For Protein " Calculations: 83.6 kg (184 lb 4.9 oz)  Fluid Requirements (mL): 1mL/kcal or per MD  Estimated Fluid Requirement Method: RDA Method  RDA Method (mL): 1746     Nutrition Prescription Ordered    Current Diet Order: Regular diet    Evaluation of Received Nutrient/Fluid Intake    Energy Calories Required: meeting needs  Protein Required: meeting needs  Fluid Required: meeting needs  % Intake of Estimated Energy Needs: 0%  % Meal Intake: NPO for c- section    Nutrition Risk    Level of Risk/Frequency of Follow-up: low     Assessment and Plan    Nutrition Problem  Inadequate energy intake    Related to (etiology):   Decreased appeite    Signs and Symptoms (as evidenced by):   PO intake 50%     Interventions/Recommendations (treatment strategy):  Collaboration with other providers    Nutrition Diagnosis Status:   New    Monitor and Evaluation    Food and Nutrient Intake: food and beverage intake  Food and Nutrient Adminstration: diet order  Knowledge/Beliefs/Attitudes: food and nutrition knowledge/skill  Physical Activity and Function: nutrition-related ADLs and IADLs  Anthropometric Measurements: weight  Biochemical Data, Medical Tests and Procedures: glucose/endocrine profile  Nutrition-Focused Physical Findings: overall appearance     Malnutrition Assessment    Renato Score: 23  Pt unable to be seen today due to pt in OR for C- section. NFPE to be completed on follow up     Nutrition Follow-Up    RD Follow-up?: Yes

## 2020-02-20 NOTE — PROGRESS NOTES
[facility]  Obstetrics  Antepartum Progress Note    Patient Name: Marisela Siddiqui  MRN: [id number]  Admission Date: 2/10/2020  Hospital Length of Stay: 10 days  Attending Physician: Siobhan Burch MD  Primary Care Provider: Ashlyn Mayorga MD    Subjective:     Principal Problem: premature rupture of membranes (PPROM) with unknown onset of labor    HPI:  Marisela Siddiqui is a 44 y.o.  who presents for leakage of fluid. She states that around 6 pm today she experienced a large gush of clear fluid with continued leakage. No contractions, vaginal bleeding. Reports good fetal movement. 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    Hospital Course:  2020: admitted for PPROM, abx initiated  2020 - NAEON, fetal tracing reassuring  2020 - PPROM abx D#2. NAEON. FHT overall reassuring throughout the day yesterday. Pt switched to NST TID.   2020 - PPROM abx D#3. NAEON  2020 - PPROM abx D#4. NAEON  02/15/2020 - PPROM abx D#5. NAEON. No complaints this AM  2020 - PPROM abx D#6. No issues overnight. Yesterday, BP was sustained in the mild range - a change from previous days in the normal range with occasional mild range pressures. Repeat PreE labs ordered for collection this AM. Previous PCR was tltc. Patient denies HA, vision changes, SOB, CP, RUQ pain.   2020 - pt complained of dysuria overnight, UA neg. Otherwise no complaints this AM. 24 hour urine initiated secondary to P:C 0.16.  2020 - NAEON. BP mild range  2020 - NAEON. Pt with small amount of spotting in AM. Pt made NPO and kept on CEFM throughout the day. Monitoring remained reassuring. No continued episodes of bleeding. Pt deescalated to NST TID and given normal diet. When patient was placed on NST at 2100, intermittent variables were noted approx q30 mins.  2020 - pt kept on  continuous monitoring overnight due to intermittent variable decelerations. Fetal monitoring otherwise reassuring.    Obstetric HPI:  PPROM abx D#9. No issues overnight. Patient denies HA, vision changes, SOB, CP, RUQ pain.     Patient reports no contractions, active fetal movement, no vaginal bleeding or loss of fluid.     Objective:     Vital Signs (Most Recent):  Temp: 98.1 °F (36.7 °C) (20 031)  Pulse: 91 (20 0318)  Resp: 16 (20)  BP: 122/79 (20)  SpO2: 98 % (20 0313) Vital Signs (24h Range):  Temp:  [96.3 °F (35.7 °C)-98.2 °F (36.8 °C)] 98.1 °F (36.7 °C)  Pulse:  [] 91  Resp:  [16-19] 16  SpO2:  [96 %-99 %] 98 %  BP: (120-139)/(79-91) 122/79     Weight: 83.6 kg (184 lb 4.9 oz)  Body mass index is 32.65 kg/m².    PM FHT: Cat 2 (overall reassuring) 140 bpm, moderate BTBV, + accels, intermittent variable decels  TOCO: No contractions/irritability    No intake or output data in the 24 hours ending 20 0700    Cervical Exam: deferred      Significant Labs:  Recent Lab Results     None        Physical Exam:   Constitutional: She is oriented to person, place, and time. She appears well-developed and well-nourished. No distress.    HENT:   Head: Normocephalic and atraumatic.    Eyes: Conjunctivae and EOM are normal.    Neck: Normal range of motion. Neck supple. No thyromegaly present.    Cardiovascular: Normal rate and regular rhythm.     Pulmonary/Chest: Effort normal. No respiratory distress.        Abdominal: Soft. There is no tenderness.   Gravid, no fundal tenderness             Musculoskeletal: Normal range of motion and moves all extremeties. She exhibits no edema or tenderness.       Neurological: She is alert and oriented to person, place, and time.    Skin: Skin is warm and dry. No rash noted.    Psychiatric: She has a normal mood and affect. Her behavior is normal. Judgment and thought content normal.       Assessment/Plan:     44 y.o. female  at  33w3d for:    *  premature rupture of membranes (PPROM)  -- cervix FT/th on admission  -- s/p BMZ for fetal lung maturity  -- PPROM antibiotics D#10   - s/p Azithromycin 1 g PO D#1, 5   - s/p Ampicillin 2 g IV q 6 hours for 48 hours   - Amoxicillin 500 mg PO BID days #3-10  -- continuous/clears due to variable decels, will reassess this AM  -- Fetus is vertex as of 2/10  -- Most recent growth scan on  with EFW in the 41%    Preeclampsia without SF  -- gHTN diagnosed on admit  -- mild HA this AM  --BP: (104-139)/(57-94) 104/58  -- Normal admission CBC, CMP, cath P:C TLTC; repeat preE labs  WNL, P:C 0.16  -- 24 hour urine 525g    History of poor pregnancy outcome (T18 x2)  - no acute needs    Recurrent oral herpes simplex  - valtrex ppx    Post partum depression  -- Diagnosed after having a 12 week miscarriage  -- Patient also has history of 2 trisomy 18 pregnancies  -- No current medications  -- Will need 1-2 week mood check on discharge          Jackie Kim MD  Obstetrics  [facility]

## 2020-02-20 NOTE — LACTATION NOTE
Pt preparing to leave for NICU. LC reviewed NICU lactation basics, including use of double electric breast pump. Pt has number and ID stickers for bottles, and is aware how to store and transport milk. Reviewed cleaning and sanitization of pump parts. LC reviewed techniques to increase supply. Pt aware of how to use NICUView. LC reminded Pt to request pump to pump at baby's bedside. All questions answered and pt verbalized understanding.

## 2020-02-21 PROBLEM — O42.919 PRETERM PREMATURE RUPTURE OF MEMBRANES (PPROM) WITH UNKNOWN ONSET OF LABOR: Status: RESOLVED | Noted: 2020-02-10 | Resolved: 2020-02-21

## 2020-02-21 PROBLEM — B00.2 RECURRENT ORAL HERPES SIMPLEX: Status: RESOLVED | Noted: 2019-01-14 | Resolved: 2020-02-21

## 2020-02-21 PROBLEM — Z98.891 S/P CESAREAN SECTION: Status: ACTIVE | Noted: 2020-02-21

## 2020-02-21 LAB
BASOPHILS # BLD AUTO: 0.04 K/UL (ref 0–0.2)
BASOPHILS NFR BLD: 0.2 % (ref 0–1.9)
DIFFERENTIAL METHOD: ABNORMAL
EOSINOPHIL # BLD AUTO: 0.2 K/UL (ref 0–0.5)
EOSINOPHIL NFR BLD: 1 % (ref 0–8)
ERYTHROCYTE [DISTWIDTH] IN BLOOD BY AUTOMATED COUNT: 13 % (ref 11.5–14.5)
HCT VFR BLD AUTO: 36.4 % (ref 37–48.5)
HGB BLD-MCNC: 11.8 G/DL (ref 12–16)
IMM GRANULOCYTES # BLD AUTO: 0.46 K/UL (ref 0–0.04)
IMM GRANULOCYTES NFR BLD AUTO: 2.9 % (ref 0–0.5)
LYMPHOCYTES # BLD AUTO: 1.9 K/UL (ref 1–4.8)
LYMPHOCYTES NFR BLD: 11.7 % (ref 18–48)
MCH RBC QN AUTO: 27.9 PG (ref 27–31)
MCHC RBC AUTO-ENTMCNC: 32.4 G/DL (ref 32–36)
MCV RBC AUTO: 86 FL (ref 82–98)
MONOCYTES # BLD AUTO: 1.2 K/UL (ref 0.3–1)
MONOCYTES NFR BLD: 7.2 % (ref 4–15)
NEUTROPHILS # BLD AUTO: 12.4 K/UL (ref 1.8–7.7)
NEUTROPHILS NFR BLD: 77 % (ref 38–73)
NRBC BLD-RTO: 0 /100 WBC
PLATELET # BLD AUTO: 182 K/UL (ref 150–350)
PMV BLD AUTO: 10.7 FL (ref 9.2–12.9)
RBC # BLD AUTO: 4.23 M/UL (ref 4–5.4)
WBC # BLD AUTO: 16.04 K/UL (ref 3.9–12.7)

## 2020-02-21 PROCEDURE — 25000003 PHARM REV CODE 250: Performed by: OBSTETRICS & GYNECOLOGY

## 2020-02-21 PROCEDURE — 99024 POSTOP FOLLOW-UP VISIT: CPT | Mod: ,,, | Performed by: OBSTETRICS & GYNECOLOGY

## 2020-02-21 PROCEDURE — 63600175 PHARM REV CODE 636 W HCPCS: Performed by: STUDENT IN AN ORGANIZED HEALTH CARE EDUCATION/TRAINING PROGRAM

## 2020-02-21 PROCEDURE — 25000003 PHARM REV CODE 250: Performed by: STUDENT IN AN ORGANIZED HEALTH CARE EDUCATION/TRAINING PROGRAM

## 2020-02-21 PROCEDURE — 99024 PR POST-OP FOLLOW-UP VISIT: ICD-10-PCS | Mod: ,,, | Performed by: OBSTETRICS & GYNECOLOGY

## 2020-02-21 PROCEDURE — 85025 COMPLETE CBC W/AUTO DIFF WBC: CPT

## 2020-02-21 PROCEDURE — 11000001 HC ACUTE MED/SURG PRIVATE ROOM

## 2020-02-21 PROCEDURE — 36415 COLL VENOUS BLD VENIPUNCTURE: CPT

## 2020-02-21 RX ORDER — OXYCODONE AND ACETAMINOPHEN 10; 325 MG/1; MG/1
1 TABLET ORAL EVERY 4 HOURS PRN
Status: DISCONTINUED | OUTPATIENT
Start: 2020-02-21 | End: 2020-02-24 | Stop reason: HOSPADM

## 2020-02-21 RX ORDER — POLYETHYLENE GLYCOL 3350 17 G/17G
17 POWDER, FOR SOLUTION ORAL 2 TIMES DAILY PRN
Status: DISCONTINUED | OUTPATIENT
Start: 2020-02-22 | End: 2020-02-24 | Stop reason: HOSPADM

## 2020-02-21 RX ORDER — POLYETHYLENE GLYCOL 3350 17 G/17G
17 POWDER, FOR SOLUTION ORAL 2 TIMES DAILY
Status: DISCONTINUED | OUTPATIENT
Start: 2020-02-21 | End: 2020-02-21

## 2020-02-21 RX ORDER — POLYETHYLENE GLYCOL 3350 17 G/17G
17 POWDER, FOR SOLUTION ORAL DAILY
Status: DISCONTINUED | OUTPATIENT
Start: 2020-02-21 | End: 2020-02-21

## 2020-02-21 RX ORDER — HYDROCODONE BITARTRATE AND ACETAMINOPHEN 5; 325 MG/1; MG/1
1 TABLET ORAL EVERY 6 HOURS PRN
Qty: 20 TABLET | Refills: 0 | Status: SHIPPED | OUTPATIENT
Start: 2020-02-21 | End: 2020-09-18

## 2020-02-21 RX ORDER — IBUPROFEN 600 MG/1
600 TABLET ORAL EVERY 6 HOURS PRN
Qty: 60 TABLET | Refills: 0 | Status: SHIPPED | OUTPATIENT
Start: 2020-02-21

## 2020-02-21 RX ADMIN — KETOROLAC TROMETHAMINE 30 MG: 30 INJECTION, SOLUTION INTRAMUSCULAR at 12:02

## 2020-02-21 RX ADMIN — OXYCODONE HYDROCHLORIDE AND ACETAMINOPHEN 1 TABLET: 5; 325 TABLET ORAL at 03:02

## 2020-02-21 RX ADMIN — POLYETHYLENE GLYCOL 3350 17 G: 17 POWDER, FOR SOLUTION ORAL at 10:02

## 2020-02-21 RX ADMIN — SENNOSIDES,DOCUSATE SODIUM 1 TABLET: 8.6; 5 TABLET, FILM COATED ORAL at 09:02

## 2020-02-21 RX ADMIN — VALACYCLOVIR HYDROCHLORIDE 500 MG: 500 TABLET, FILM COATED ORAL at 08:02

## 2020-02-21 RX ADMIN — SIMETHICONE CHEW TAB 80 MG 80 MG: 80 TABLET ORAL at 12:02

## 2020-02-21 RX ADMIN — OXYCODONE HYDROCHLORIDE AND ACETAMINOPHEN 1 TABLET: 5; 325 TABLET ORAL at 04:02

## 2020-02-21 RX ADMIN — POLYETHYLENE GLYCOL 3350 17 G: 17 POWDER, FOR SOLUTION ORAL at 09:02

## 2020-02-21 RX ADMIN — OXYCODONE AND ACETAMINOPHEN 1 TABLET: 10; 325 TABLET ORAL at 08:02

## 2020-02-21 RX ADMIN — PRENATAL VIT W/ FE FUMARATE-FA TAB 27-0.8 MG 1 TABLET: 27-0.8 TAB at 08:02

## 2020-02-21 RX ADMIN — KETOROLAC TROMETHAMINE 30 MG: 30 INJECTION, SOLUTION INTRAMUSCULAR at 07:02

## 2020-02-21 RX ADMIN — SIMETHICONE CHEW TAB 80 MG 80 MG: 80 TABLET ORAL at 08:02

## 2020-02-21 RX ADMIN — IBUPROFEN 800 MG: 400 TABLET, FILM COATED ORAL at 12:02

## 2020-02-21 RX ADMIN — DOCUSATE SODIUM 200 MG: 100 CAPSULE, LIQUID FILLED ORAL at 08:02

## 2020-02-21 RX ADMIN — IBUPROFEN 800 MG: 400 TABLET, FILM COATED ORAL at 08:02

## 2020-02-21 RX ADMIN — ONDANSETRON 8 MG: 8 TABLET, ORALLY DISINTEGRATING ORAL at 08:02

## 2020-02-21 RX ADMIN — OXYCODONE HYDROCHLORIDE AND ACETAMINOPHEN 1 TABLET: 5; 325 TABLET ORAL at 08:02

## 2020-02-21 RX ADMIN — SIMETHICONE CHEW TAB 80 MG 80 MG: 80 TABLET ORAL at 07:02

## 2020-02-21 NOTE — LACTATION NOTE
02/21/20 1500   Breasts WDL   Breast WDL WDL   Breast Pumping   Breast Pumping double electric breast pump utilized   Breast Pumping Interventions frequent pumping encouraged   Maternal Feeding Assessment   Maternal Emotional State relaxed   Reproductive Interventions   Breastfeeding Assistance electric breast pump used   Breastfeeding Support diary/feeding log utilized;encouragement provided;lactation counseling provided;maternal hydration promoted;maternal nutrition promoted;maternal rest encouraged   lactation rounds. Pt pumping education reviewed. Hand expression, pt return demonstrated. To call LC as needed.

## 2020-02-21 NOTE — LACTATION NOTE
02/21/20 1100   Maternal Infant Feeding   Maternal Emotional State relaxed   pt in ricks headed to NICU. Answered pt's questions regarding containers. Pt to call LC once she returns to her room for education review.

## 2020-02-21 NOTE — SUBJECTIVE & OBJECTIVE
Obstetric HPI:  PPROM abx D#9. No issues overnight. Patient denies HA, vision changes, SOB, CP, RUQ pain.     Patient reports no contractions, active fetal movement, no vaginal bleeding or loss of fluid.     Objective:     Vital Signs (Most Recent):  Temp: 98.1 °F (36.7 °C) (02/21/20 0355)  Pulse: 86 (02/21/20 0355)  Resp: 18 (02/21/20 0355)  BP: 126/81 (02/21/20 0355)  SpO2: 96 % (02/21/20 0355) Vital Signs (24h Range):  Temp:  [96.4 °F (35.8 °C)-98.3 °F (36.8 °C)] 98.1 °F (36.7 °C)  Pulse:  [] 86  Resp:  [16-20] 18  SpO2:  [93 %-100 %] 96 %  BP: (104-141)/(57-94) 126/81     Weight: 83.6 kg (184 lb 4.9 oz)  Body mass index is 32.65 kg/m².    PM FHT: Cat 2 (overall reassuring) 140 bpm, moderate BTBV, + accels, intermittent variable decels  TOCO: No contractions/irritability      Intake/Output Summary (Last 24 hours) at 2/21/2020 0705  Last data filed at 2/21/2020 0402  Gross per 24 hour   Intake 2278.44 ml   Output 1955 ml   Net 323.44 ml       Cervical Exam: deferred      Significant Labs:  Recent Lab Results       02/21/20  0357   02/20/20  1044   02/20/20  1044   02/20/20  0930        Allens Test   N/A N/A       Baso # 0.04     0.06     Basophil% 0.2     0.3     Site   Other Other       DelSys   No Device No Device       Differential Method Automated     Automated     Eos # 0.2     0.3     Eosinophil% 1.0     1.5     Gran # (ANC) 12.4     14.6     Gran% 77.0     77.6     Group & Rh       O POS     Hematocrit 36.4     41.9     Hemoglobin 11.8     13.8     Immature Grans (Abs) 0.46  Comment:  Mild elevation in immature granulocytes is non specific and   can be seen in a variety of conditions including stress response,   acute inflammation, trauma and pregnancy. Correlation with other   laboratory and clinical findings is essential.       0.63  Comment:  Mild elevation in immature granulocytes is non specific and   can be seen in a variety of conditions including stress response,   acute inflammation, trauma  and pregnancy. Correlation with other   laboratory and clinical findings is essential.       Immature Granulocytes 2.9     3.4     INDIRECT TIMI       NEG     Lymph # 1.9     1.9     Lymph% 11.7     10.1     MCH 27.9     28.1     MCHC 32.4     32.9     MCV 86     85     Mono # 1.2     1.3     Mono% 7.2     7.1     MPV 10.7     10.9     nRBC 0     0     Platelets 182     206     POC BE   -2 -1       POC HCO3   23.9 25.3       POC PCO2   45.6 50.8       POC PH   7.327 7.305       POC PO2   21 15       POC SATURATED O2   30 16       RBC 4.23     4.91     RDW 13.0     13.1     Sample   CRD V CRD A       WBC 16.04     18.78         Physical Exam:   Constitutional: She is oriented to person, place, and time. She appears well-developed and well-nourished. No distress.    HENT:   Head: Normocephalic and atraumatic.    Eyes: Conjunctivae and EOM are normal.    Neck: Normal range of motion. Neck supple. No thyromegaly present.    Cardiovascular: Normal rate and regular rhythm.     Pulmonary/Chest: Effort normal. No respiratory distress.        Abdominal: Soft. There is no tenderness.   Bandage in place, c/d/i             Musculoskeletal: Normal range of motion and moves all extremeties. She exhibits no edema or tenderness.       Neurological: She is alert and oriented to person, place, and time.    Skin: Skin is warm and dry. No rash noted.    Psychiatric: She has a normal mood and affect. Her behavior is normal. Judgment and thought content normal.

## 2020-02-21 NOTE — ASSESSMENT & PLAN NOTE
-- pt asymptomatic this AM  -- BP: (104-141)/(57-94) 126/81  -- Normal admission CBC, CMP, cath P:C TLTC; repeat preE labs 2/16 WNL, P:C 0.16  -- 24 hour urine 595g

## 2020-02-21 NOTE — PLAN OF CARE
COPIED FROM INFANT'S CHART    SOCIAL WORK DISCHARGE PLANNING ASSESSMENT     Sw completed discharge planning assessment with pt's mother at pt's bedside.  Pt's mother was easily engaged. Education on the role of  was provided. Emotional support provided throughout assessment.        Legal Name: Dwain Rojas                          :  2020         Patient Active Problem List   Diagnosis    Prematurity, 1,750-1,999 grams, 33-34 completed weeks    Need for observation and evaluation of  for sepsis    Pulmonary insufficiency of     Hyperbilirubinemia of prematurity            [facility]           FUNMILAYO: 20     Birth Weight:   1.82 kg (4 lb 0.2 oz)                Birth Length: 42.4 cm              Gestational Age: 33w3d           Apgars    Living status:  Living  Apgars:   1 min.:   5 min.:   10 min.:   15 min.:   20 min.:     Skin color:   0  1          Heart rate:   2  2          Reflex irritability:   2  2          Muscle tone:   2  2          Respiratory effort:   2  2          Total:   8  9          Apgars assigned by:  NICU            Mother: Marisela Siddiqui, age 44  Address: [address] [place] [address]  Phone: [phone]  Education: master's degree        Father: Andrew Rojas  Address: same as mom  Phone: [phone]  Education:  doctoral degree  Signed Birth Certificate: Yes; parents are      Support person(s): [place] ([facility]) [phone]     Sibling(s): none     Spiritual Affiliation: Yes  [facility]     Commercial Insurance Coverage: Yes       [facility] [place] [facility] Primary: No Secondary: No       Pediatrician: Prefers [facility].  List provided.  Mom to select MD and inform bedside RN       Nutrition: Expressed Breast Milk               Breast Pump:              No               Plans to rent from hospital               WIC:              N/A        Essential Items: (includes car seat,  crib/bassinet/pack-n-play, clothing, bottles, diapers, etc.)  Plans to acquire by discharge      Transportation: Personal vehicle      Education: Information given on CPR classes; Physician/NNP daily rounds; and Postpartum Depression signs.      Potential Eligibility for SSI Benefits: No     Potential Discharge Needs:  None         [facility]  NICU   Phone [phone]. [phone]  [email or web address]

## 2020-02-21 NOTE — ASSESSMENT & PLAN NOTE
- POD#1  - pain controlled  - bleeding stable  - H/h: 13/41>11/36  - pt pumping for infant in NICU, lactation consult PRN

## 2020-02-21 NOTE — DISCHARGE INSTRUCTIONS
LC did DC teaching for NICU mother pumping for her baby. Pt has NICU Mothers Lactation Booklet for lactation. Reviewed how to work pump, keep track of pumpings, label breastmilk, clean pump parts and safely store and transport milk. Pt aware to pump 8 or more times a day for 15-20 minutes. Pt is aware she can use the [employer] breastpump at the baby's bedside when she visits. Mother has lactation phone number to call for further questions. Pt verbalized understanding and questions answered.    Preparation and Hygiene:    1. Shower daily.  2. Wear a clean bra each day and wash daily in warm soapy water.  3. Change wet or moist breast pads frequently.  Moist pads can promote growth of germs.  4. Actively wash your hands, paying close attention to the area around and under your fingernails, thoroughly with soap and water for 15 seconds before pumping or handling your milk.  Re-wash your hands if you touch anything (scratching your nose, answering the phone, etc) while pumping or handling your milk.   5. Before pumping your breasts, assemble the pump collection kit and have ready the sterile container and labels.  Place these items on a clean surface next to the breastpump.  6. Each time after you have finished pumping, take apart all of the parts of the breastpump collection kit and place them in a separate cleaning container (do not place them in the sink).  Be sure to remove the yellow valve from the breastshield and separate the white membrane from the yellow valve.  Rinse all of these parts with cool water.  Then use a new sponge and/or bottle brush and dishwashing detergent to clean the parts.  Rinse off the soapy water with cool water and air dry on a clean towel covered with a clean cloth.  All parts may also be washed after each use in the top rack of a .  7. Once each day, sterilize all of the parts of the breastpump collection kit.  This can be done by boiling the kit parts for 10 minutes or by using  a Quick Clean Micro-Steam Bag made by [employer]  8. If condensation appears in the tubing, continue to run the pump with the tubing attached for 1-2 minutes or until the tubing is dry.   9. Notify your babys nurse or doctor if you become ill or need to take any medication, even over-the-counter medicines.        Collection and Storage of Expressed Breastmilk:         1. Pump your breasts at least 8-10 times every 24 hours.  Double pump (both breasts at  the same time) for at least 15-20 minutes using the most suction that is comfortable.    2. Write the date and time of pumping and the name of any medications you are takingon the babys pre-printed hospital identification label.   3. Place your babys pre-printed hospital identification label on each container of breastmilk.  Additional pre-printed labels can be obtained from your babys nurse.  If your expressed breastmilk is not correctly labeled, the nurse cannot feed the milk to the baby.       4. Place a brightly colored sticker on the top of each container of milk pumped during the first 30 days.  This identifies the milk as special and having higher levels of nutrients and anti-infective properties that are so important for your baby.  Additional stickers can be obtained from the lactation consultants or your babys nurse.  5.   Do not touch the inside of the storage containers or lids.  6.      Pour the amount of expressed milk needed for 1 of your babys feedings into each   storage container. Use a new container(s) for each pumping.  Additional storage   containers can be obtained from your babys nurse.        7.       Tightly screw the lid onto the container and place immediately into the       refrigerator fordaily transportation to the hospital.   Do not freeze your milk      unless asked to do so by your babys nurse.  However, if you are not able to      visit your baby each day, place the expressed breastmilk in the freezer.  8.   Expressed  breastmilk should be refrigerated or frozen within 1 hour of      pumping.  9.      Do not store expressed breastmilk on the door of your refrigerator or freezer where the temperature is warmer.         Transportation of Expressed Breastmilk:    1. Refrigerated breastmilk or frozen milk should be packed tightly together in a cooler with frozen, blue gel-packs to keep the milk frozen.  DO NOT USE ICE CUBES (WET ICE) TO TRANSPORT FROZEN MILK.   A clean towel can be used to fill any extra space between containers of frozen milk.  2.    Bring your expressed milk from home each time you visit the baby.

## 2020-02-21 NOTE — PROGRESS NOTES
[facility]  Obstetrics  Postpartum Progress Note    Patient Name: Marisela Siddiqui  MRN: [id number]  Admission Date: 2/10/2020  Hospital Length of Stay: 11 days  Attending Physician: Siobhan Burch MD  Primary Care Provider: Ashlyn Mayorga MD    Subjective:     Principal Problem: premature rupture of membranes (PPROM) with unknown onset of labor    Obstetric HPI:  PPROM abx D#9. No issues overnight. Patient denies HA, vision changes, SOB, CP, RUQ pain.     Patient reports no contractions, active fetal movement, no vaginal bleeding or loss of fluid.     Objective:     Vital Signs (Most Recent):  Temp: 98.1 °F (36.7 °C) (20 035)  Pulse: 86 (20 035)  Resp: 18 (20)  BP: 126/81 (20)  SpO2: 96 % (20) Vital Signs (24h Range):  Temp:  [96.4 °F (35.8 °C)-98.3 °F (36.8 °C)] 98.1 °F (36.7 °C)  Pulse:  [] 86  Resp:  [16-20] 18  SpO2:  [93 %-100 %] 96 %  BP: (104-141)/(57-94) 126/81     Weight: 83.6 kg (184 lb 4.9 oz)  Body mass index is 32.65 kg/m².    PM FHT: Cat 2 (overall reassuring) 140 bpm, moderate BTBV, + accels, intermittent variable decels  TOCO: No contractions/irritability      Intake/Output Summary (Last 24 hours) at 2020 0705  Last data filed at 2020 0402  Gross per 24 hour   Intake 2278.44 ml   Output 1955 ml   Net 323.44 ml       Cervical Exam: deferred      Significant Labs:  Recent Lab Results       20  0357   20  1044   20  1044   20  0930        Allens Test   N/A N/A       Baso # 0.04     0.06     Basophil% 0.2     0.3     Site   Other Other       DelSys   No Device No Device       Differential Method Automated     Automated     Eos # 0.2     0.3     Eosinophil% 1.0     1.5     Gran # (ANC) 12.4     14.6     Gran% 77.0     77.6     Group & Rh       O POS     Hematocrit 36.4     41.9     Hemoglobin 11.8     13.8     Immature Grans (Abs) 0.46  Comment:  Mild elevation in immature granulocytes  is non specific and   can be seen in a variety of conditions including stress response,   acute inflammation, trauma and pregnancy. Correlation with other   laboratory and clinical findings is essential.       0.63  Comment:  Mild elevation in immature granulocytes is non specific and   can be seen in a variety of conditions including stress response,   acute inflammation, trauma and pregnancy. Correlation with other   laboratory and clinical findings is essential.       Immature Granulocytes 2.9     3.4     INDIRECT TIMI       NEG     Lymph # 1.9     1.9     Lymph% 11.7     10.1     MCH 27.9     28.1     MCHC 32.4     32.9     MCV 86     85     Mono # 1.2     1.3     Mono% 7.2     7.1     MPV 10.7     10.9     nRBC 0     0     Platelets 182     206     POC BE   -2 -1       POC HCO3   23.9 25.3       POC PCO2   45.6 50.8       POC PH   7.327 7.305       POC PO2   21 15       POC SATURATED O2   30 16       RBC 4.23     4.91     RDW 13.0     13.1     Sample   CRD V CRD A       WBC 16.04     18.78         Physical Exam:   Constitutional: She is oriented to person, place, and time. She appears well-developed and well-nourished. No distress.    HENT:   Head: Normocephalic and atraumatic.    Eyes: Conjunctivae and EOM are normal.    Neck: Normal range of motion. Neck supple. No thyromegaly present.    Cardiovascular: Normal rate and regular rhythm.     Pulmonary/Chest: Effort normal. No respiratory distress.        Abdominal: Soft. There is no tenderness.   Bandage in place, c/d/i             Musculoskeletal: Normal range of motion and moves all extremeties. She exhibits no edema or tenderness.       Neurological: She is alert and oriented to person, place, and time.    Skin: Skin is warm and dry. No rash noted.    Psychiatric: She has a normal mood and affect. Her behavior is normal. Judgment and thought content normal.       Assessment/Plan:     44 y.o. female  for:    S/P  section  - POD#1  - pain  controlled  - bleeding stable  - H/h: 13/41>11/36  - pt pumping for infant in NICU, lactation consult PRN       Pre-eclampsia in third trimester  -- pt asymptomatic this AM  -- BP: (104-141)/(57-94) 126/81  -- Normal admission CBC, CMP, cath P:C TLTC; repeat preE labs 2/16 WNL, P:C 0.16  -- 24 hour urine 595g    Post partum depression  -- Diagnosed after having a 12 week miscarriage  -- Patient also has history of 2 trisomy 18 pregnancies  -- No current medications  -- Will need 1-2 week mood check on discharge        Disposition: As patient meets milestones, will plan to discharge POD3-4.    Jackie Kim MD  Obstetrics  [facility]

## 2020-02-21 NOTE — ANESTHESIA POSTPROCEDURE EVALUATION
Anesthesia Post Evaluation    Patient: Marisela Siddiqui    Procedure(s) Performed: Procedure(s) (LRB):   SECTION (N/A)    Final Anesthesia Type: spinal    Patient location during evaluation: floor  Patient participation: Yes- Able to Participate  Level of consciousness: awake and alert and oriented  Post-procedure vital signs: reviewed and stable  Pain management: adequate  Airway patency: patent    PONV status at discharge: No PONV  Anesthetic complications: no      Cardiovascular status: blood pressure returned to baseline and hemodynamically stable  Respiratory status: unassisted, spontaneous ventilation and room air  Hydration status: euvolemic  Follow-up not needed.          Vitals Value Taken Time   /83 2020  8:05 AM   Temp 36.8 °C (98.3 °F) 2020  8:05 AM   Pulse 83 2020  8:05 AM   Resp 18 2020  8:05 AM   SpO2 97 % 2020  8:05 AM         Event Time     Out of Recovery 2020 13:35:00          Pain/Aniket Score: Pain Rating Prior to Med Admin: 5 (2020  8:53 AM)  Pain Rating Post Med Admin: 3 (2020  5:02 AM)

## 2020-02-22 PROCEDURE — 25000003 PHARM REV CODE 250: Performed by: STUDENT IN AN ORGANIZED HEALTH CARE EDUCATION/TRAINING PROGRAM

## 2020-02-22 PROCEDURE — 11000001 HC ACUTE MED/SURG PRIVATE ROOM

## 2020-02-22 RX ORDER — DOCUSATE SODIUM 100 MG/1
200 CAPSULE, LIQUID FILLED ORAL 2 TIMES DAILY
Qty: 60 CAPSULE | Refills: 1 | Status: SHIPPED | OUTPATIENT
Start: 2020-02-22

## 2020-02-22 RX ADMIN — DOCUSATE SODIUM 200 MG: 100 CAPSULE, LIQUID FILLED ORAL at 08:02

## 2020-02-22 RX ADMIN — IBUPROFEN 800 MG: 400 TABLET, FILM COATED ORAL at 05:02

## 2020-02-22 RX ADMIN — ONDANSETRON 8 MG: 8 TABLET, ORALLY DISINTEGRATING ORAL at 10:02

## 2020-02-22 RX ADMIN — POLYETHYLENE GLYCOL 3350 17 G: 17 POWDER, FOR SOLUTION ORAL at 03:02

## 2020-02-22 RX ADMIN — OXYCODONE AND ACETAMINOPHEN 1 TABLET: 10; 325 TABLET ORAL at 06:02

## 2020-02-22 RX ADMIN — SIMETHICONE CHEW TAB 80 MG 80 MG: 80 TABLET ORAL at 05:02

## 2020-02-22 RX ADMIN — SIMETHICONE CHEW TAB 80 MG 80 MG: 80 TABLET ORAL at 12:02

## 2020-02-22 RX ADMIN — DOCUSATE SODIUM 200 MG: 100 CAPSULE, LIQUID FILLED ORAL at 09:02

## 2020-02-22 RX ADMIN — SIMETHICONE CHEW TAB 80 MG 80 MG: 80 TABLET ORAL at 08:02

## 2020-02-22 RX ADMIN — IBUPROFEN 800 MG: 400 TABLET, FILM COATED ORAL at 08:02

## 2020-02-22 RX ADMIN — SENNOSIDES,DOCUSATE SODIUM 1 TABLET: 8.6; 5 TABLET, FILM COATED ORAL at 08:02

## 2020-02-22 RX ADMIN — OXYCODONE AND ACETAMINOPHEN 1 TABLET: 10; 325 TABLET ORAL at 09:02

## 2020-02-22 RX ADMIN — OXYCODONE AND ACETAMINOPHEN 1 TABLET: 10; 325 TABLET ORAL at 10:02

## 2020-02-22 RX ADMIN — OXYCODONE AND ACETAMINOPHEN 1 TABLET: 10; 325 TABLET ORAL at 01:02

## 2020-02-22 RX ADMIN — PRENATAL VIT W/ FE FUMARATE-FA TAB 27-0.8 MG 1 TABLET: 27-0.8 TAB at 09:02

## 2020-02-22 RX ADMIN — VALACYCLOVIR HYDROCHLORIDE 500 MG: 500 TABLET, FILM COATED ORAL at 08:02

## 2020-02-22 RX ADMIN — OXYCODONE AND ACETAMINOPHEN 1 TABLET: 10; 325 TABLET ORAL at 05:02

## 2020-02-22 RX ADMIN — IBUPROFEN 800 MG: 400 TABLET, FILM COATED ORAL at 12:02

## 2020-02-22 RX ADMIN — VALACYCLOVIR HYDROCHLORIDE 500 MG: 500 TABLET, FILM COATED ORAL at 09:02

## 2020-02-22 RX ADMIN — OXYCODONE AND ACETAMINOPHEN 1 TABLET: 10; 325 TABLET ORAL at 12:02

## 2020-02-22 NOTE — LACTATION NOTE
Pt pumping at this time, using 27mm flanges which are too large for pt, education provided and pt to use 24mm flanges at next session, to call for flange fit assessment. Pt pumping frequently and sterilized pump parts yesterday. Provided with rental pump paperwork. Pt v/u of education and questions answered.       02/22/20 0930   Maternal Assessment   Breast Shape round   Breast Density soft   Areola elastic   Nipples everted   Equipment Type   Breast Pump Type double electric, hospital grade   Breast Pump Flange Type hard   Breast Pump Flange Size 27 mm   Breast Pumping   Breast Pumping Interventions frequent pumping encouraged   Breast Pumping bilateral breasts pumped until soft;double electric breast pump utilized

## 2020-02-22 NOTE — ASSESSMENT & PLAN NOTE
- POD #2, meeting all post-operative milestones  - pain controlled  - bleeding stable  - H/h: 13/41>11/36  - pt pumping for infant in NICU, lactation consult PRN   - Rh positive

## 2020-02-22 NOTE — PROGRESS NOTES
[facility]  Obstetrics  Postpartum Progress Note    Patient Name: Marisela Siddiqui  MRN: [id number]  Admission Date: 2/10/2020  Hospital Length of Stay: 12 days  Attending Physician: Siobhan Burch MD  Primary Care Provider: Ashlyn Mayorga MD    Subjective:     Principal Problem: premature rupture of membranes (PPROM) with unknown onset of labor    Hospital course: 2020: admitted for PPROM, abx initiated  2020 - NAEON, fetal tracing reassuring  2020 - PPROM abx D#2. NAEON. FHT overall reassuring throughout the day yesterday. Pt switched to NST TID.   2020 - PPROM abx D#3. NAEON  2020 - PPROM abx D#4. NAEON  02/15/2020 - PPROM abx D#5. NAEON. No complaints this AM  2020 - PPROM abx D#6. No issues overnight. Yesterday, BP was sustained in the mild range - a change from previous days in the normal range with occasional mild range pressures. Repeat PreE labs ordered for collection this AM. Previous PCR was tltc. Patient denies HA, vision changes, SOB, CP, RUQ pain.   2020 - pt complained of dysuria overnight, UA neg. Otherwise no complaints this AM. 24 hour urine initiated secondary to P:C 0.16.  2020 - NAEON. BP mild range  2020 - NAEON. Pt with small amount of spotting in AM. Pt made NPO and kept on CEFM throughout the day. Monitoring remained reassuring. No continued episodes of bleeding. Pt deescalated to NST TID and given normal diet. When patient was placed on NST at 2100, intermittent variables were noted approx q30 mins.  2020 - pt kept on continuous monitoring overnight due to intermittent variable decelerations. Fetal monitoring otherwise reassuring. Decision made to proceed with delivery for fetal tracing.  performed without complications  2020 - POD#1. Pt doing well. Moderate pain. Bleeding stable. Ambulating and voiding without difficulty.  2020 - POD#2. Pt continues to do well. Moderate pain.  Bleeding stable. Ambulating and voiding without difficulty.    Interval History:   She is doing well this morning. She is tolerating a regular diet without nausea or vomiting. She is voiding spontaneously. She is ambulating. She has passed flatus, and had a BM last evening. Vaginal bleeding is mild. She denies fever or chills. Abdominal pain is moderate and controlled with oral medications. She is pumping for infant in NICU.    Objective:     Vital Signs (Most Recent):  Temp: 97.6 °F (36.4 °C) (02/22/20 0530)  Pulse: 83 (02/22/20 0530)  Resp: 20 (02/22/20 0530)  BP: 130/83 (02/22/20 0530)  SpO2: 96 % (02/22/20 0530) Vital Signs (24h Range):  Temp:  [97.5 °F (36.4 °C)-98.3 °F (36.8 °C)] 97.6 °F (36.4 °C)  Pulse:  [83-90] 83  Resp:  [16-20] 20  SpO2:  [96 %-97 %] 96 %  BP: (127-135)/(71-86) 130/83     Weight: 83.6 kg (184 lb 4.9 oz)  Body mass index is 32.65 kg/m².    No intake or output data in the 24 hours ending 02/22/20 0720    Significant Labs:  Lab Results   Component Value Date    GROUPTRH O POS 02/20/2020    HEPBSAG Negative 09/17/2019     Recent Labs   Lab 02/21/20  0357   HGB 11.8*   HCT 36.4*       I have personallly reviewed all pertinent lab results from the last 24 hours.    Physical Exam:   Constitutional: She is oriented to person, place, and time. She appears well-developed and well-nourished. No distress.    HENT:   Head: Normocephalic and atraumatic.    Eyes: Conjunctivae and EOM are normal.    Neck: Normal range of motion. Neck supple. No thyromegaly present.    Cardiovascular: Normal rate and regular rhythm.     Pulmonary/Chest: Effort normal. No respiratory distress.        Abdominal: Soft. There is no tenderness.   Pfannenstiel incision c/d/i without erythema or drainage             Musculoskeletal: Normal range of motion and moves all extremeties. She exhibits no edema or tenderness.       Neurological: She is alert and oriented to person, place, and time.    Skin: Skin is warm and dry. No rash  noted.    Psychiatric: She has a normal mood and affect. Her behavior is normal. Judgment and thought content normal.       Assessment/Plan:     44 y.o. female  for:    S/P  section  - POD #2, meeting all post-operative milestones  - pain controlled  - bleeding stable  - H/h: >  - pt pumping for infant in NICU, lactation consult PRN   - Rh positive      Pre-eclampsia in third trimester  -- Pt asymptomatic this AM  -- BP: (127-135)/(71-86) 130/83  -- Normal admission CBC, CMP, cath P:C TLTC; repeat preE labs  WNL, P:C 0.16  -- 24 hour urine 595g      History of poor pregnancy outcome (T18 x2)  - no acute needs    Post partum depression  -- Diagnosed after having a 12 week miscarriage  -- Patient also has history of 2 trisomy 18 pregnancies  -- No current medications  -- Will need 1-2 week mood check on discharge      Disposition: As patient meets milestones, will plan to discharge on POD #2-4.      Marisela Gotti MD  Obstetrics  [facility]

## 2020-02-22 NOTE — SUBJECTIVE & OBJECTIVE
Hospital course: 2020: admitted for PPROM, abx initiated  2020 - NAEON, fetal tracing reassuring  2020 - PPROM abx D#2. NAEON. FHT overall reassuring throughout the day yesterday. Pt switched to NST TID.   2020 - PPROM abx D#3. NAEON  2020 - PPROM abx D#4. NAEON  02/15/2020 - PPROM abx D#5. NAEON. No complaints this AM  2020 - PPROM abx D#6. No issues overnight. Yesterday, BP was sustained in the mild range - a change from previous days in the normal range with occasional mild range pressures. Repeat PreE labs ordered for collection this AM. Previous PCR was tltc. Patient denies HA, vision changes, SOB, CP, RUQ pain.   2020 - pt complained of dysuria overnight, UA neg. Otherwise no complaints this AM. 24 hour urine initiated secondary to P:C 0.16.  2020 - NAEON. BP mild range  2020 - NAEON. Pt with small amount of spotting in AM. Pt made NPO and kept on CEFM throughout the day. Monitoring remained reassuring. No continued episodes of bleeding. Pt deescalated to NST TID and given normal diet. When patient was placed on NST at 2100, intermittent variables were noted approx q30 mins.  2020 - pt kept on continuous monitoring overnight due to intermittent variable decelerations. Fetal monitoring otherwise reassuring. Decision made to proceed with delivery for fetal tracing.  performed without complications  2020 - POD#1. Pt doing well. Moderate pain. Bleeding stable. Ambulating and voiding without difficulty.  2020 - POD#2. Pt continues to do well. Moderate pain. Bleeding stable. Ambulating and voiding without difficulty.    Interval History:   She is doing well this morning. She is tolerating a regular diet without nausea or vomiting. She is voiding spontaneously. She is ambulating. She has passed flatus, and had a BM last evening. Vaginal bleeding is mild. She denies fever or chills. Abdominal pain is moderate and controlled with oral  medications. She is pumping for infant in NICU.    Objective:     Vital Signs (Most Recent):  Temp: 97.6 °F (36.4 °C) (02/22/20 0530)  Pulse: 83 (02/22/20 0530)  Resp: 20 (02/22/20 0530)  BP: 130/83 (02/22/20 0530)  SpO2: 96 % (02/22/20 0530) Vital Signs (24h Range):  Temp:  [97.5 °F (36.4 °C)-98.3 °F (36.8 °C)] 97.6 °F (36.4 °C)  Pulse:  [83-90] 83  Resp:  [16-20] 20  SpO2:  [96 %-97 %] 96 %  BP: (127-135)/(71-86) 130/83     Weight: 83.6 kg (184 lb 4.9 oz)  Body mass index is 32.65 kg/m².    No intake or output data in the 24 hours ending 02/22/20 0720    Significant Labs:  Lab Results   Component Value Date    GROUPTRH O POS 02/20/2020    HEPBSAG Negative 09/17/2019     Recent Labs   Lab 02/21/20  0357   HGB 11.8*   HCT 36.4*       I have personallly reviewed all pertinent lab results from the last 24 hours.    Physical Exam:   Constitutional: She is oriented to person, place, and time. She appears well-developed and well-nourished. No distress.    HENT:   Head: Normocephalic and atraumatic.    Eyes: Conjunctivae and EOM are normal.    Neck: Normal range of motion. Neck supple. No thyromegaly present.    Cardiovascular: Normal rate and regular rhythm.     Pulmonary/Chest: Effort normal. No respiratory distress.        Abdominal: Soft. There is no tenderness.   Pfannenstiel incision c/d/i without erythema or drainage             Musculoskeletal: Normal range of motion and moves all extremeties. She exhibits no edema or tenderness.       Neurological: She is alert and oriented to person, place, and time.    Skin: Skin is warm and dry. No rash noted.    Psychiatric: She has a normal mood and affect. Her behavior is normal. Judgment and thought content normal.

## 2020-02-22 NOTE — ASSESSMENT & PLAN NOTE
-- Pt asymptomatic this AM  -- BP: (127-135)/(71-86) 130/83  -- Normal admission CBC, CMP, cath P:C TLTC; repeat preE labs 2/16 WNL, P:C 0.16  -- 24 hour urine 595g

## 2020-02-23 PROCEDURE — 11000001 HC ACUTE MED/SURG PRIVATE ROOM

## 2020-02-23 PROCEDURE — 25000003 PHARM REV CODE 250: Performed by: STUDENT IN AN ORGANIZED HEALTH CARE EDUCATION/TRAINING PROGRAM

## 2020-02-23 PROCEDURE — 99024 POSTOP FOLLOW-UP VISIT: CPT | Mod: ,,, | Performed by: OBSTETRICS & GYNECOLOGY

## 2020-02-23 PROCEDURE — 99024 PR POST-OP FOLLOW-UP VISIT: ICD-10-PCS | Mod: ,,, | Performed by: OBSTETRICS & GYNECOLOGY

## 2020-02-23 RX ADMIN — IBUPROFEN 800 MG: 400 TABLET, FILM COATED ORAL at 02:02

## 2020-02-23 RX ADMIN — PRENATAL VIT W/ FE FUMARATE-FA TAB 27-0.8 MG 1 TABLET: 27-0.8 TAB at 10:02

## 2020-02-23 RX ADMIN — SIMETHICONE CHEW TAB 80 MG 80 MG: 80 TABLET ORAL at 02:02

## 2020-02-23 RX ADMIN — OXYCODONE AND ACETAMINOPHEN 1 TABLET: 10; 325 TABLET ORAL at 10:02

## 2020-02-23 RX ADMIN — DOCUSATE SODIUM 200 MG: 100 CAPSULE, LIQUID FILLED ORAL at 10:02

## 2020-02-23 RX ADMIN — VALACYCLOVIR HYDROCHLORIDE 500 MG: 500 TABLET, FILM COATED ORAL at 10:02

## 2020-02-23 RX ADMIN — OXYCODONE AND ACETAMINOPHEN 1 TABLET: 10; 325 TABLET ORAL at 02:02

## 2020-02-23 RX ADMIN — OXYCODONE AND ACETAMINOPHEN 1 TABLET: 10; 325 TABLET ORAL at 06:02

## 2020-02-23 RX ADMIN — IBUPROFEN 800 MG: 400 TABLET, FILM COATED ORAL at 06:02

## 2020-02-23 RX ADMIN — ONDANSETRON 8 MG: 8 TABLET, ORALLY DISINTEGRATING ORAL at 06:02

## 2020-02-23 RX ADMIN — IBUPROFEN 800 MG: 400 TABLET, FILM COATED ORAL at 10:02

## 2020-02-23 NOTE — PROGRESS NOTES
[facility]  Obstetrics  Postpartum Progress Note    Patient Name: Marisela Siddiqui  MRN: [id number]  Admission Date: 2/10/2020  Hospital Length of Stay: 13 days  Attending Physician: Siobhan Burch MD  Primary Care Provider: Ashlyn Mayorga MD    Subjective:     Principal Problem:S/P  section    Hospital course: 2020: admitted for PPROM, abx initiated  2020 - NAEON, fetal tracing reassuring  2020 - PPROM abx D#2. NAEON. FHT overall reassuring throughout the day yesterday. Pt switched to NST TID.   2020 - PPROM abx D#3. NAEON  2020 - PPROM abx D#4. NAEON  02/15/2020 - PPROM abx D#5. NAEON. No complaints this AM  2020 - PPROM abx D#6. No issues overnight. Yesterday, BP was sustained in the mild range - a change from previous days in the normal range with occasional mild range pressures. Repeat PreE labs ordered for collection this AM. Previous PCR was tltc. Patient denies HA, vision changes, SOB, CP, RUQ pain.   2020 - pt complained of dysuria overnight, UA neg. Otherwise no complaints this AM. 24 hour urine initiated secondary to P:C 0.16.  2020 - NAEON. BP mild range  2020 - NAEON. Pt with small amount of spotting in AM. Pt made NPO and kept on CEFM throughout the day. Monitoring remained reassuring. No continued episodes of bleeding. Pt deescalated to NST TID and given normal diet. When patient was placed on NST at 2100, intermittent variables were noted approx q30 mins.  2020 - pt kept on continuous monitoring overnight due to intermittent variable decelerations. Fetal monitoring otherwise reassuring. Decision made to proceed with delivery for fetal tracing.  performed without complications  2020 - POD#1. Pt doing well. Moderate pain. Bleeding stable. Ambulating and voiding without difficulty.  2020 - POD#2. Pt continues to do well. Moderate pain. Bleeding stable. Ambulating and voiding without  difficulty.  02/23/2020 - POD#3. Continuing to meet all postop milestones. Doing well overall. Ambulating without difficulty. Voiding appropriately. Passing flatus.     Interval History:   She is doing well this morning. She is tolerating a regular diet without nausea or vomiting. She is voiding spontaneously. She is ambulating. She has passed flatus, and had a BM last evening. Vaginal bleeding is mild. She denies fever or chills. Abdominal pain is moderate and controlled with oral medications. She is pumping for infant in NICU.      Objective:     Vital Signs (Most Recent):  Temp: 97.8 °F (36.6 °C) (02/22/20 1222)  Pulse: 96 (02/22/20 1222)  Resp: 18 (02/22/20 1222)  BP: 136/79 (02/22/20 1222)  SpO2: 97 % (02/22/20 1222) Vital Signs (24h Range):  Temp:  [97.6 °F (36.4 °C)-97.9 °F (36.6 °C)] 97.8 °F (36.6 °C)  Pulse:  [77-96] 96  Resp:  [18-20] 18  SpO2:  [96 %-97 %] 97 %  BP: (125-136)/(76-83) 136/79     Weight: 83.6 kg (184 lb 4.9 oz)  Body mass index is 32.65 kg/m².    No intake or output data in the 24 hours ending 02/23/20 0303    Significant Labs:  Lab Results   Component Value Date    GROUPTRH O POS 02/20/2020    HEPBSAG Negative 09/17/2019     Recent Labs   Lab 02/21/20  0357   HGB 11.8*   HCT 36.4*       CBC:   Recent Labs   Lab 02/21/20  0357   WBC 16.04*   RBC 4.23   HGB 11.8*   HCT 36.4*      MCV 86   MCH 27.9   MCHC 32.4     I have personallly reviewed all pertinent lab results from the last 24 hours.    Physical Exam:   Constitutional: She is oriented to person, place, and time. She appears well-developed.    HENT:   Head: Normocephalic and atraumatic.    Eyes: EOM are normal.    Neck: Normal range of motion.    Cardiovascular: Normal rate.     Pulmonary/Chest: Effort normal.        Abdominal: Soft. Bowel sounds are normal. She exhibits abdominal incision (pfannenstiel incision clean/ dry/intact without erythema). She exhibits no distension. There is no tenderness.             Musculoskeletal:  Normal range of motion.       Neurological: She is alert and oriented to person, place, and time.    Skin: Skin is warm and dry.    Psychiatric: She has a normal mood and affect.       Assessment/Plan:     44 y.o. female  for:    * S/P  section  - POD #2, meeting all post-operative milestones  - pain controlled  - bleeding stable  - H/h: >36  - pt pumping for infant in NICU, lactation consult PRN   - Rh positive      Pre-eclampsia in third trimester  -- Pt asymptomatic this AM  -- BP: (125-136)/(76-83) 136/79  -- Normal admission CBC, CMP, cath P:C TLTC; repeat preE labs  WNL, P:C 0.16  -- 24 hour urine 595g      History of poor pregnancy outcome (T18 x2)  - no acute needs    Post partum depression  -- Diagnosed after having a 12 week miscarriage  -- Patient also has history of 2 trisomy 18 pregnancies  -- No current medications  -- Will need 1-2 week mood check on discharge      Disposition: As patient meets milestones, will plan to discharge POD3-4.    Coco Tian MD  Obstetrics  [facility]

## 2020-02-23 NOTE — LACTATION NOTE
LC rounds. Pt has pumped x 8 in last 24 hours, last achieved 8mL. Support and encouragement provided. Beginning to experience some engorgement bilaterally, discussed comfort measures including making pumping bra so she can massage breasts while pumping. Pt on way to NICU for latch and will pump there. Pt v/u of education and questions answered.       02/23/20 1040   Maternal Assessment   Breast Shape round   Breast Density engorged   Areola elastic   Nipples everted   Maternal Infant Feeding   Comfort Measures Following Feeding   (encouraged ice after pumping)   Breast Pumping   Breast Pumping Interventions frequent pumping encouraged

## 2020-02-23 NOTE — ASSESSMENT & PLAN NOTE
- POD #4, meeting all post-operative milestones  - pain controlled  - bleeding stable  - H/h: 13/41>11/36  - pt pumping for infant in NICU, lactation consult PRN   - Rh positive

## 2020-02-23 NOTE — ASSESSMENT & PLAN NOTE
-- Pt asymptomatic this AM  -- BP: (125-136)/(76-83) 136/79  -- Normal admission CBC, CMP, cath P:C TLTC; repeat preE labs 2/16 WNL, P:C 0.16  -- 24 hour urine 595g

## 2020-02-23 NOTE — SUBJECTIVE & OBJECTIVE
Hospital course: 2020: admitted for PPROM, abx initiated  2020 - NAEON, fetal tracing reassuring  2020 - PPROM abx D#2. NAEON. FHT overall reassuring throughout the day yesterday. Pt switched to NST TID.   2020 - PPROM abx D#3. NAEON  2020 - PPROM abx D#4. NAEON  02/15/2020 - PPROM abx D#5. NAEON. No complaints this AM  2020 - PPROM abx D#6. No issues overnight. Yesterday, BP was sustained in the mild range - a change from previous days in the normal range with occasional mild range pressures. Repeat PreE labs ordered for collection this AM. Previous PCR was tltc. Patient denies HA, vision changes, SOB, CP, RUQ pain.   2020 - pt complained of dysuria overnight, UA neg. Otherwise no complaints this AM. 24 hour urine initiated secondary to P:C 0.16.  2020 - NAEON. BP mild range  2020 - NAEON. Pt with small amount of spotting in AM. Pt made NPO and kept on CEFM throughout the day. Monitoring remained reassuring. No continued episodes of bleeding. Pt deescalated to NST TID and given normal diet. When patient was placed on NST at 2100, intermittent variables were noted approx q30 mins.  2020 - pt kept on continuous monitoring overnight due to intermittent variable decelerations. Fetal monitoring otherwise reassuring. Decision made to proceed with delivery for fetal tracing.  performed without complications  2020 - POD#1. Pt doing well. Moderate pain. Bleeding stable. Ambulating and voiding without difficulty.  2020 - POD#2. Pt continues to do well. Moderate pain. Bleeding stable. Ambulating and voiding without difficulty.  2020 - POD#3. Continuing to meet all postop milestones. Doing well overall. Ambulating without difficulty. Voiding appropriately. Passing flatus.     Interval History:   She is doing well this morning. She is tolerating a regular diet without nausea or vomiting. She is voiding spontaneously. She is ambulating. She has  passed flatus, and had a BM last evening. Vaginal bleeding is mild. She denies fever or chills. Abdominal pain is moderate and controlled with oral medications. She is pumping for infant in NICU.      Objective:     Vital Signs (Most Recent):  Temp: 97.8 °F (36.6 °C) (02/22/20 1222)  Pulse: 96 (02/22/20 1222)  Resp: 18 (02/22/20 1222)  BP: 136/79 (02/22/20 1222)  SpO2: 97 % (02/22/20 1222) Vital Signs (24h Range):  Temp:  [97.6 °F (36.4 °C)-97.9 °F (36.6 °C)] 97.8 °F (36.6 °C)  Pulse:  [77-96] 96  Resp:  [18-20] 18  SpO2:  [96 %-97 %] 97 %  BP: (125-136)/(76-83) 136/79     Weight: 83.6 kg (184 lb 4.9 oz)  Body mass index is 32.65 kg/m².    No intake or output data in the 24 hours ending 02/23/20 0303    Significant Labs:  Lab Results   Component Value Date    GROUPTRH O POS 02/20/2020    HEPBSAG Negative 09/17/2019     Recent Labs   Lab 02/21/20  0357   HGB 11.8*   HCT 36.4*       CBC:   Recent Labs   Lab 02/21/20  0357   WBC 16.04*   RBC 4.23   HGB 11.8*   HCT 36.4*      MCV 86   MCH 27.9   MCHC 32.4     I have personallly reviewed all pertinent lab results from the last 24 hours.    Physical Exam:   Constitutional: She is oriented to person, place, and time. She appears well-developed.    HENT:   Head: Normocephalic and atraumatic.    Eyes: EOM are normal.    Neck: Normal range of motion.    Cardiovascular: Normal rate.     Pulmonary/Chest: Effort normal.        Abdominal: Soft. Bowel sounds are normal. She exhibits abdominal incision (pfannenstiel incision clean/ dry/intact without erythema). She exhibits no distension. There is no tenderness.             Musculoskeletal: Normal range of motion.       Neurological: She is alert and oriented to person, place, and time.    Skin: Skin is warm and dry.    Psychiatric: She has a normal mood and affect.

## 2020-02-24 VITALS
SYSTOLIC BLOOD PRESSURE: 147 MMHG | HEART RATE: 84 BPM | DIASTOLIC BLOOD PRESSURE: 85 MMHG | OXYGEN SATURATION: 98 % | BODY MASS INDEX: 32.66 KG/M2 | HEIGHT: 63 IN | WEIGHT: 184.31 LBS | TEMPERATURE: 99 F | RESPIRATION RATE: 18 BRPM

## 2020-02-24 PROCEDURE — 99024 PR POST-OP FOLLOW-UP VISIT: ICD-10-PCS | Mod: ,,, | Performed by: OBSTETRICS & GYNECOLOGY

## 2020-02-24 PROCEDURE — 25000003 PHARM REV CODE 250: Performed by: STUDENT IN AN ORGANIZED HEALTH CARE EDUCATION/TRAINING PROGRAM

## 2020-02-24 PROCEDURE — 99024 POSTOP FOLLOW-UP VISIT: CPT | Mod: ,,, | Performed by: OBSTETRICS & GYNECOLOGY

## 2020-02-24 RX ADMIN — VALACYCLOVIR HYDROCHLORIDE 500 MG: 500 TABLET, FILM COATED ORAL at 09:02

## 2020-02-24 RX ADMIN — PRENATAL VIT W/ FE FUMARATE-FA TAB 27-0.8 MG 1 TABLET: 27-0.8 TAB at 09:02

## 2020-02-24 RX ADMIN — OXYCODONE AND ACETAMINOPHEN 1 TABLET: 10; 325 TABLET ORAL at 10:02

## 2020-02-24 RX ADMIN — IBUPROFEN 800 MG: 400 TABLET, FILM COATED ORAL at 02:02

## 2020-02-24 RX ADMIN — IBUPROFEN 800 MG: 400 TABLET, FILM COATED ORAL at 06:02

## 2020-02-24 RX ADMIN — OXYCODONE AND ACETAMINOPHEN 1 TABLET: 10; 325 TABLET ORAL at 02:02

## 2020-02-24 RX ADMIN — OXYCODONE AND ACETAMINOPHEN 1 TABLET: 10; 325 TABLET ORAL at 06:02

## 2020-02-24 RX ADMIN — OXYCODONE AND ACETAMINOPHEN 1 TABLET: 10; 325 TABLET ORAL at 03:02

## 2020-02-24 RX ADMIN — DOCUSATE SODIUM 200 MG: 100 CAPSULE, LIQUID FILLED ORAL at 09:02

## 2020-02-24 NOTE — PROGRESS NOTES
MD to bedside to assess bruising underneath incision. Mildly fluctuant, minimnal concern for hematoma or seroma forming. Appropriately tender. Non indurated. Incision c/d/i. Stable for discharge    Haresh Casper MD  OB/GYN PGY-4  Pager: [phone]

## 2020-02-24 NOTE — LACTATION NOTE
RN called LC due to mother's breast are hard, red and warm. LC encouraged RN to tell mother to take a warm shower and massage breast. Then pump and apply ice. Continue to cycle: Warm, massage, pump and ice packs for 20 minutes till engorgement relieved. Call LC when ready for Dc teaching. RN to pul LC name on white board.

## 2020-02-24 NOTE — NURSING
Pt c/o increase in swelling and pain below incision. MD to bedside to assess prior to d/c. Will continue to monitor.

## 2020-02-24 NOTE — ASSESSMENT & PLAN NOTE
-- Pt asymptomatic this AM  -- BP: (142-145)/(82-88) 145/85  -- Normal admission CBC, CMP, cath P:C TLTC; repeat preE labs 2/16 WNL, P:C 0.16  -- 24 hour urine 595g

## 2020-02-24 NOTE — NURSING
Pt c/o increased swelling below abdominal incision; no redness, warmth to the site; VSS. MD notified, no new orders. Will continue to monitor.

## 2020-02-24 NOTE — SUBJECTIVE & OBJECTIVE
Hospital course: 2020: admitted for PPROM, abx initiated  2020 - NAEON, fetal tracing reassuring  2020 - PPROM abx D#2. NAEON. FHT overall reassuring throughout the day yesterday. Pt switched to NST TID.   2020 - PPROM abx D#3. NAEON  2020 - PPROM abx D#4. NAEON  02/15/2020 - PPROM abx D#5. NAEON. No complaints this AM  2020 - PPROM abx D#6. No issues overnight. Yesterday, BP was sustained in the mild range - a change from previous days in the normal range with occasional mild range pressures. Repeat PreE labs ordered for collection this AM. Previous PCR was tltc. Patient denies HA, vision changes, SOB, CP, RUQ pain.   2020 - pt complained of dysuria overnight, UA neg. Otherwise no complaints this AM. 24 hour urine initiated secondary to P:C 0.16.  2020 - NAEON. BP mild range  2020 - NAEON. Pt with small amount of spotting in AM. Pt made NPO and kept on CEFM throughout the day. Monitoring remained reassuring. No continued episodes of bleeding. Pt deescalated to NST TID and given normal diet. When patient was placed on NST at 2100, intermittent variables were noted approx q30 mins.  2020 - pt kept on continuous monitoring overnight due to intermittent variable decelerations. Fetal monitoring otherwise reassuring. Decision made to proceed with delivery for fetal tracing.  performed without complications  2020 - POD#1. Pt doing well. Moderate pain. Bleeding stable. Ambulating and voiding without difficulty.  2020 - POD#2. Pt continues to do well. Moderate pain. Bleeding stable. Ambulating and voiding without difficulty.  2020 - POD#3. Continuing to meet all postop milestones. Doing well overall. Ambulating without difficulty. Voiding appropriately. Passing flatus.   2020 - POD#4. Continuing to meet all postop milestones. Doing well overall. Ambulating without difficulty. Voiding appropriately. Passing flatus.     Interval  History: NAEO.    She is doing well this morning. She is tolerating a regular diet without nausea or vomiting. She is voiding spontaneously. She is ambulating. She has passed flatus, and has a BM. Vaginal bleeding is mild. She denies fever or chills. Abdominal pain is moderate and controlled with oral medications. She is breastfeeding.     Objective:     Vital Signs (Most Recent):  Temp: 98.5 °F (36.9 °C) (02/24/20 0011)  Pulse: 77 (02/24/20 0011)  Resp: 18 (02/24/20 0011)  BP: (!) 145/85 (02/24/20 0011)  SpO2: 97 % (02/24/20 0011) Vital Signs (24h Range):  Temp:  [97.7 °F (36.5 °C)-98.5 °F (36.9 °C)] 98.5 °F (36.9 °C)  Pulse:  [77-88] 77  Resp:  [18] 18  SpO2:  [97 %-98 %] 97 %  BP: (142-145)/(82-88) 145/85     Weight: 83.6 kg (184 lb 4.9 oz)  Body mass index is 32.65 kg/m².    No intake or output data in the 24 hours ending 02/24/20 0648    Significant Labs:  Lab Results   Component Value Date    GROUPTRH O POS 02/20/2020    HEPBSAG Negative 09/17/2019     No results for input(s): HGB, HCT in the last 48 hours.    None    Physical Exam:   Constitutional: She is oriented to person, place, and time. She appears well-developed and well-nourished. No distress.    HENT:   Head: Normocephalic and atraumatic.    Eyes: Conjunctivae and EOM are normal.    Neck: Normal range of motion. Neck supple. No thyromegaly present.    Cardiovascular: Normal rate and regular rhythm.     Pulmonary/Chest: Effort normal. No respiratory distress.        Abdominal: Soft. There is no tenderness.   Pfannenstiel incision c/d/i without erythema or drainage             Musculoskeletal: Normal range of motion and moves all extremeties. She exhibits no edema or tenderness.       Neurological: She is alert and oriented to person, place, and time.    Skin: Skin is warm and dry. No rash noted.    Psychiatric: She has a normal mood and affect. Her behavior is normal. Judgment and thought content normal.

## 2020-02-24 NOTE — LACTATION NOTE
LC did DC teaching for NICU mother pumping for her baby. Mother has NICU Blue folder for lactation. Reviewed how to work pump, how to keep track of pumpings, how to label nicu breastmilk, how to clean pump parts and bring milk to NICU even if it is only a drop of milk. aware to pump 8 or more times a day for 15-20 minutes. Mother is aware of proper techniques for transporting her breastmilk and is aware of the written instructions in her Mother's NICU Breastfeeding Guide. Mother is using a rental Symphony pump at home and is aware that she can use the Symphony breastpump at the baby's bedside when she visits. Mother has the MBU phone number and the NICU LC phone number to call for further questions.    Mother having issues with engorgement so KEKE reviewed that in detail. SHe will useheat, pump, cold treatment along with massage and vibration from an electric tootbrush to empty her breast. She will call if she needs asst.

## 2020-02-24 NOTE — PROGRESS NOTES
[facility]  Obstetrics  Postpartum Progress Note    Patient Name: Marisela Siddiqui  MRN: [id number]  Admission Date: 2/10/2020  Hospital Length of Stay: 14 days  Attending Physician: Siobhan Burch MD  Primary Care Provider: Ashlyn Mayorga MD    Subjective:     Principal Problem:S/P  section    Hospital course: 2020: admitted for PPROM, abx initiated  2020 - NAEON, fetal tracing reassuring  2020 - PPROM abx D#2. NAEON. FHT overall reassuring throughout the day yesterday. Pt switched to NST TID.   2020 - PPROM abx D#3. NAEON  2020 - PPROM abx D#4. NAEON  02/15/2020 - PPROM abx D#5. NAEON. No complaints this AM  2020 - PPROM abx D#6. No issues overnight. Yesterday, BP was sustained in the mild range - a change from previous days in the normal range with occasional mild range pressures. Repeat PreE labs ordered for collection this AM. Previous PCR was tltc. Patient denies HA, vision changes, SOB, CP, RUQ pain.   2020 - pt complained of dysuria overnight, UA neg. Otherwise no complaints this AM. 24 hour urine initiated secondary to P:C 0.16.  2020 - NAEON. BP mild range  2020 - NAEON. Pt with small amount of spotting in AM. Pt made NPO and kept on CEFM throughout the day. Monitoring remained reassuring. No continued episodes of bleeding. Pt deescalated to NST TID and given normal diet. When patient was placed on NST at 2100, intermittent variables were noted approx q30 mins.  2020 - pt kept on continuous monitoring overnight due to intermittent variable decelerations. Fetal monitoring otherwise reassuring. Decision made to proceed with delivery for fetal tracing.  performed without complications  2020 - POD#1. Pt doing well. Moderate pain. Bleeding stable. Ambulating and voiding without difficulty.  2020 - POD#2. Pt continues to do well. Moderate pain. Bleeding stable. Ambulating and voiding without  difficulty.  02/23/2020 - POD#3. Continuing to meet all postop milestones. Doing well overall. Ambulating without difficulty. Voiding appropriately. Passing flatus.   02/24/2020 - POD#4. Continuing to meet all postop milestones. Doing well overall. Ambulating without difficulty. Voiding appropriately. Passing flatus.     Interval History: NAEO.    She is doing well this morning. She is tolerating a regular diet without nausea or vomiting. She is voiding spontaneously. She is ambulating. She has passed flatus, and has a BM. Vaginal bleeding is mild. She denies fever or chills. Abdominal pain is moderate and controlled with oral medications. She is breastfeeding.     Objective:     Vital Signs (Most Recent):  Temp: 98.5 °F (36.9 °C) (02/24/20 0011)  Pulse: 77 (02/24/20 0011)  Resp: 18 (02/24/20 0011)  BP: (!) 145/85 (02/24/20 0011)  SpO2: 97 % (02/24/20 0011) Vital Signs (24h Range):  Temp:  [97.7 °F (36.5 °C)-98.5 °F (36.9 °C)] 98.5 °F (36.9 °C)  Pulse:  [77-88] 77  Resp:  [18] 18  SpO2:  [97 %-98 %] 97 %  BP: (142-145)/(82-88) 145/85     Weight: 83.6 kg (184 lb 4.9 oz)  Body mass index is 32.65 kg/m².    No intake or output data in the 24 hours ending 02/24/20 0648    Significant Labs:  Lab Results   Component Value Date    GROUPTRH O POS 02/20/2020    HEPBSAG Negative 09/17/2019     No results for input(s): HGB, HCT in the last 48 hours.    None    Physical Exam:   Constitutional: She is oriented to person, place, and time. She appears well-developed and well-nourished. No distress.    HENT:   Head: Normocephalic and atraumatic.    Eyes: Conjunctivae and EOM are normal.    Neck: Normal range of motion. Neck supple. No thyromegaly present.    Cardiovascular: Normal rate and regular rhythm.     Pulmonary/Chest: Effort normal. No respiratory distress.        Abdominal: Soft. There is no tenderness.   Pfannenstiel incision c/d/i without erythema or drainage             Musculoskeletal: Normal range of motion and moves  all extremeties. She exhibits no edema or tenderness.       Neurological: She is alert and oriented to person, place, and time.    Skin: Skin is warm and dry. No rash noted.    Psychiatric: She has a normal mood and affect. Her behavior is normal. Judgment and thought content normal.       Assessment/Plan:     44 y.o. female  for:    * S/P  section  - POD #4, meeting all post-operative milestones  - pain controlled  - bleeding stable  - H/h: >  - pt pumping for infant in NICU, lactation consult PRN   - Rh positive      Pre-eclampsia in third trimester  -- Pt asymptomatic this AM  -- BP: (142-145)/(82-88) 145/85  -- Normal admission CBC, CMP, cath P:C TLTC; repeat preE labs  WNL, P:C 0.16  -- 24 hour urine 595g      History of poor pregnancy outcome (T18 x2)  - no acute needs    Post partum depression  -- Diagnosed after having a 12 week miscarriage  -- Patient also has history of 2 trisomy 18 pregnancies  -- No current medications  -- Will need 1-2 week mood check on discharge        Disposition: As patient meets milestones, will plan to discharge on POD #4.      Marisela Gotti MD  Obstetrics  [facility]

## 2020-02-24 NOTE — DISCHARGE SUMMARY
[facility]  Obstetrics  Discharge Summary      Patient Name: Marisela Siddiqui  MRN: [id number]  Admission Date: 2/10/2020  Hospital Length of Stay: 14 days  Discharge Date and Time:  2020 6:46 AM  Attending Physician: Siobhan Burch MD   Discharging Provider: Jackie Kim MD   Primary Care Provider: Ashlyn Mayorga MD    HPI: Marisela Siddiqui is a 44 y.o.  who presents for leakage of fluid. She states that around 6 pm today she experienced a large gush of clear fluid with continued leakage. No contractions, vaginal bleeding. Reports good fetal movement. 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        Procedure(s) (LRB):   SECTION (N/A)     Hospital Course:   2020: admitted for PPROM, abx initiated  2020 - NAEON, fetal tracing reassuring  2020 - PPROM abx D#2. NAEON. FHT overall reassuring throughout the day yesterday. Pt switched to NST TID.   2020 - PPROM abx D#3. NAEON  2020 - PPROM abx D#4. NAEON  02/15/2020 - PPROM abx D#5. NAEON. No complaints this AM  2020 - PPROM abx D#6. No issues overnight. Yesterday, BP was sustained in the mild range - a change from previous days in the normal range with occasional mild range pressures. Repeat PreE labs ordered for collection this AM. Previous PCR was tltc. Patient denies HA, vision changes, SOB, CP, RUQ pain.   2020 - pt complained of dysuria overnight, UA neg. Otherwise no complaints this AM. 24 hour urine initiated secondary to P:C 0.16.  2020 - NAEON. BP mild range  2020 - NAEON. Pt with small amount of spotting in AM. Pt made NPO and kept on CEFM throughout the day. Monitoring remained reassuring. No continued episodes of bleeding. Pt deescalated to NST TID and given normal diet. When patient was placed on NST at 2100, intermittent variables were noted approx q30  mins.  2020 - pt kept on continuous monitoring overnight due to intermittent variable decelerations. Fetal monitoring otherwise reassuring. Decision made to proceed with delivery for fetal tracing.  performed without complications  2020 - POD#1. Pt doing well. Moderate pain. Bleeding stable. Ambulating and voiding without difficulty.  2020 - POD#2. Pt continues to do well. Moderate pain. Bleeding stable. Ambulating and voiding without difficulty.  2020 - POD#3. Continuing to meet all postop milestones. Doing well overall. Ambulating without difficulty. Voiding appropriately. Passing flatus.   2020 - POD#4. Meeting all postop milestones. Stable for d/c     Consults (From admission, onward)        Status Ordering Provider     Inpatient consult to Anesthesiology  Once     Provider:  (Not yet assigned)    Acknowledged MIGUELITO LARA     Inpatient consult to Lactation  Once     Provider:  (Not yet assigned)    Completed MIGUELITO LARA          Final Active Diagnoses:    Diagnosis Date Noted POA    PRINCIPAL PROBLEM:  S/P  section [Z98.891] 2020 Not Applicable    Pre-eclampsia in third trimester [O14.93] 02/10/2020 Yes    Post partum depression [O99.345, F53.0] 2019 Yes      Problems Resolved During this Admission:    Diagnosis Date Noted Date Resolved POA     premature rupture of membranes (PPROM) [O42.919] 02/10/2020 2020 Yes    Recurrent oral herpes simplex [B00.2] 2019 Yes        Labs: CBC No results for input(s): WBC, HGB, HCT, PLT in the last 48 hours.    Feeding Method: breast, pumping for infant in NICU    Immunizations     Date Immunization Status Dose Route/Site Given by    20 1111 MMR Incomplete 0.5 mL Subcutaneous/Left deltoid     20 1111 Tdap Incomplete 0.5 mL Intramuscular/Left deltoid           Delivery:    Episiotomy: None   Lacerations: None   Repair suture:     Repair # of packets: 7   Blood loss  (ml): 0     Birth information:  YOB: 2020   Time of birth: 10:08 AM   Sex: male   Delivery type: , Low Transverse   Gestational Age: 33w3d    Delivery Clinician:      Other providers:       Additional  information:  Forceps:    Vacuum:    Breech:    Observed anomalies      Living?:           APGARS  One minute Five minutes Ten minutes   Skin color:         Heart rate:         Grimace:         Muscle tone:         Breathing:         Totals: 8  9        Placenta: Delivered:       appearance    Pending Diagnostic Studies:     Procedure Component Value Units Date/Time    Specimen to Pathology, Surgery Gynecology and Obstetrics [[id number]] Collected:  20 1009    Order Status:  Sent Lab Status:  In process Updated:  20          Discharged Condition: good    Disposition: Home or Self Care    Follow Up:  Follow-up Information     Siobhan Burch MD. Schedule an appointment as soon as possible for a visit in 2 weeks.    Specialty:  Obstetrics and Gynecology  Why:  Incision/Mood Check  Contact information:  [address] [id number] [phone]             Siobhan Burch MD In 6 weeks.    Specialty:  Obstetrics and Gynecology  Why:  Post-partum visit  Contact information:  [address] [address]  [phone]                 Patient Instructions:      Diet Adult Regular     Other restrictions (specify):   Order Comments: Pelvic rest- nothing in the vagina for 6 weeks (no sex, douching, tampons, etc).   No driving until not taking narcotics, or until you feel as though you can safely slam on the brakes without pain  No baths for 6 weeks, only showers     Notify your health care provider if you experience any of the following:  temperature >100.4     Notify your health care provider if you experience any of the following:  persistent nausea and vomiting or diarrhea     Notify your health care provider if you experience any of the  following:  severe uncontrolled pain     Notify your health care provider if you experience any of the following:  redness, tenderness, or signs of infection (pain, swelling, redness, odor or green/yellow discharge around incision site)     Notify your health care provider if you experience any of the following:  difficulty breathing or increased cough     Notify your health care provider if you experience any of the following:  severe persistent headache     Notify your health care provider if you experience any of the following:  worsening rash     Notify your health care provider if you experience any of the following:  persistent dizziness, light-headedness, or visual disturbances     Notify your health care provider if you experience any of the following:  increased confusion or weakness     Notify your health care provider if you experience any of the following:   Order Comments: Heavy vaginal bleeding saturating more than 1 pad per hour for at least 2 hours.     Activity as tolerated     Medications:  Current Discharge Medication List      START taking these medications    Details   docusate sodium (COLACE) 100 MG capsule Take 2 capsules (200 mg total) by mouth 2 (two) times daily.  Qty: 60 capsule, Refills: 1      HYDROcodone-acetaminophen (NORCO) 5-325 mg per tablet Take 1 tablet by mouth every 6 (six) hours as needed.  Qty: 20 tablet, Refills: 0    Comments: Quantity prescribed more than 7 day supply? No      ibuprofen (ADVIL,MOTRIN) 600 MG tablet Take 1 tablet (600 mg total) by mouth every 6 (six) hours as needed for Pain.  Qty: 60 tablet, Refills: 0         CONTINUE these medications which have NOT CHANGED    Details   augmented betamethasone (DIPROLENE) 0.05 % gel Apply topically 2 (two) times daily. To scalp PRN severe irritation BID to AA  Qty: 50 g, Refills: 2    Associated Diagnoses: Seborrheic dermatitis      ciclopirox 1 % shampoo Qd prn  Qty: 120 mL, Refills: 3      DERMA-SMOOTHE/FS SCALP OIL  0.01 % Oil Apply oil to damp scalp nightly and cover with shower cap.  Qty: 1 Bottle, Refills: 3      fluocinolone (DERMA-SMOOTHE) 0.01 % external oil Apply topically 3 (three) times daily.  Qty: 118.28 mL, Refills: 0      fluocinonide (LIDEX) 0.05 % external solution Apply topically 2 (two) times daily. To scalp to rash  PRN  Qty: 60 mL, Refills: 3    Associated Diagnoses: Seborrheic dermatitis of scalp      hydrocortisone 2.5 % cream Apply topically 2 (two) times daily. To rash on chest and behind ears PRN  Qty: 28 g, Refills: 2    Associated Diagnoses: Seborrheic dermatitis      ketoconazole (NIZORAL) 2 % cream AAA bid left ear & right nose  Qty: 60 g, Refills: 3    Associated Diagnoses: Seborrheic dermatitis of scalp      ketoconazole (NIZORAL) 2 % shampoo Apply topically once daily.  Qty: 120 mL, Refills: 2    Associated Diagnoses: Seborrheic dermatitis      prenatal vit,calc76/iron/folic (PNV 29-1 ORAL) Take by mouth.      valACYclovir (VALTREX) 1000 MG tablet TAKE 2 TABLETS BY MOUTH TWICE DAILY FOR 1 DAY AS NEEDED FOR COLD SORES  Qty: 30 tablet, Refills: 0             Jackie Kim MD  Obstetrics  [facility]

## 2020-02-25 NOTE — PLAN OF CARE
VSS. Ambulating and voiding without difficulty; passing flatus no BM prior to d/c. Fundus firm, lochia light. Pain well controlled with oral pain medication. RX delivered to bedside per [facility] outpatient pharmacy. Abdominal incision c/d/I; minimal bruising and swelling beneath incision, MD to bedside to assess prior to d/c. No c/o distress or discomfort. Discharge instructions reviewed with pt; questions encouraged and answered, verbalized understanding. Pt to f/u with Dr. Burch in 2 weeks for incision and mood check, 6 weeks for postpartum check. ID bands verified, paperwork signed. OK to d/c per MD order.

## 2020-02-28 ENCOUNTER — TELEPHONE (OUTPATIENT)
Dept: OBSTETRICS AND GYNECOLOGY | Facility: OTHER | Age: 45
End: 2020-02-28

## 2020-03-10 LAB — FINAL PATHOLOGIC DIAGNOSIS: NORMAL

## 2020-03-17 ENCOUNTER — TELEPHONE (OUTPATIENT)
Dept: OBSTETRICS AND GYNECOLOGY | Facility: CLINIC | Age: 45
End: 2020-03-17

## 2020-03-17 DIAGNOSIS — B37.9 CANDIDIASIS: Primary | ICD-10-CM

## 2020-03-17 RX ORDER — FLUCONAZOLE 150 MG/1
150 TABLET ORAL DAILY
Qty: 3 TABLET | Refills: 0 | Status: SHIPPED | OUTPATIENT
Start: 2020-03-17 | End: 2020-03-20

## 2020-03-17 NOTE — TELEPHONE ENCOUNTER
Spoke to patient. Informed her that Dr. Burch will not be in the office and to see if the patient had any issues or concerns. Patient states she is not having any issues and is okay with postponing postpartum visit due to increased COVID precautions. Patient will be rescheduled at a later time.

## 2020-04-06 ENCOUNTER — PATIENT MESSAGE (OUTPATIENT)
Dept: OBSTETRICS AND GYNECOLOGY | Facility: CLINIC | Age: 45
End: 2020-04-06

## 2020-04-09 ENCOUNTER — OFFICE VISIT (OUTPATIENT)
Dept: OBSTETRICS AND GYNECOLOGY | Facility: CLINIC | Age: 45
End: 2020-04-09
Payer: COMMERCIAL

## 2020-04-09 DIAGNOSIS — F41.8 POSTPARTUM ANXIETY: ICD-10-CM

## 2020-04-09 DIAGNOSIS — Z98.891 S/P CESAREAN SECTION: Primary | ICD-10-CM

## 2020-04-09 PROCEDURE — 0503F POSTPARTUM CARE VISIT: CPT | Mod: ,,, | Performed by: OBSTETRICS & GYNECOLOGY

## 2020-04-09 PROCEDURE — 0503F PR POSTPARTUM CARE VISIT: ICD-10-PCS | Mod: ,,, | Performed by: OBSTETRICS & GYNECOLOGY

## 2020-04-09 RX ORDER — SERTRALINE HYDROCHLORIDE 25 MG/1
25 TABLET, FILM COATED ORAL DAILY
Qty: 30 TABLET | Refills: 11 | Status: SHIPPED | OUTPATIENT
Start: 2020-04-09 | End: 2021-04-09

## 2020-04-09 NOTE — PROGRESS NOTES
The patient location is: home  The chief complaint leading to consultation is: postpartum visit  Visit type: Virtual visit with synchronous audio and video  Total time spent with patient: 34 min  Each patient to whom he or she provides medical services by telemedicine is:  (1) informed of the relationship between the physician and patient and the respective role of any other health care provider with respect to management of the patient; and (2) notified that he or she may decline to receive medical services by telemedicine and may withdraw from such care at any time.        CC: Post-partum follow-up    Marisela Siddiqui is a 44 y.o. female  presents for post-partum visit s/p a , due to PPROM with NRFHTs at 33w3d.  Pt reports doing well but notes that she has been having a lot of anxiety post partum.  Notes that anxiety comes in waves and feels that it is exacerbated by current COVID situation.  Has been on Wellbutrin and Zofran in the past for anxiety.  Interested in restarting medication.  Not having any SI/HI.  Otherwise doing well.  Breastfeeding and pumping. Pain well controlled.  Minimal vaginal spotting.     Delivery Date: 2020  Delivery MD: Siobhan Burch MD  Gender: male  Birth Weight: 4 pounds 0 ounces  Breast Feeding: YES  Depression: YES  Contraception: no method    Pregnancy was complicated by:  AMA, hx of trisomy 18 x2, multiple SABs, PPROM, and anxiety       LMP 2019 (Exact Date)     ROS:  GENERAL: No fever, chills, fatigability or weight loss.  VULVAR: No pain, no lesions and no itching.  VAGINAL: No relaxation, no itching, no discharge, no abnormal bleeding and no lesions.  ABDOMEN: No abdominal pain. Denies nausea. Denies vomiting. No diarrhea. No constipation  BREAST: Denies pain. No lumps. No discharge.  URINARY: No incontinence, no nocturia, no frequency and no dysuria.  CARDIOVASCULAR: No chest pain. No shortness of breath. No leg cramps.  NEUROLOGICAL: No  headaches. No vision changes.    PHYSICAL EXAM:  CONSTITUTIONAL: normal affect, normal behavior, alert and oriented.   ABDOMEN: Non-distended.  Pfannenstiel incision healing well.  No areas of drainage, bleeding or infection noted.  Right side of incision at edge still healing but approximated well.  No dehiscence noted.    PROCEDURES:  - none      Diagnosis:  1. S/P  section    2. Postpartum anxiety        Plan:     Diagnoses and all orders for this visit:    S/P  section  - Healing well.  Pfannenstiel appears healed.  Would start with light exercise and increase as tolerated  - Breastfeeding and doing well  - Declines contraception.  Is open to another pregnancy    Postpartum anxiety  - Pt with signs of postpartum anxiety.  Discussed at length with patient.  Talked about ways to cope with anxiety and discussed medication that can help.  Will try zoloft, as patient is breastfeeding and she has been on this before  - Will start with low dose.  Given precautions.  Pt to contact us with any adverse effects.  Will increase dosage if needed  -     sertraline (ZOLOFT) 25 MG tablet; Take 1 tablet (25 mg total) by mouth once daily.        No orders of the defined types were placed in this encounter.      Follow up in about 1 year (around 2021) for annual.      Orders Placed This Encounter    sertraline (ZOLOFT) 25 MG tablet         Patient was counseled today on A.C.S. Pap guidelines and recommendations for yearly pelvic exams and monthly self breast exams; to see her PCP for other health maintenance.    Siobhan Burch MD  Obstetrics & Gynecology

## 2020-04-21 ENCOUNTER — PATIENT MESSAGE (OUTPATIENT)
Dept: LACTATION | Facility: CLINIC | Age: 45
End: 2020-04-21

## 2020-04-21 ENCOUNTER — CLINICAL SUPPORT (OUTPATIENT)
Dept: LACTATION | Facility: CLINIC | Age: 45
End: 2020-04-21
Payer: COMMERCIAL

## 2020-04-21 DIAGNOSIS — R63.39 FEEDING PROBLEM IN CHILD OVER 28 DAYS OLD: Primary | ICD-10-CM

## 2020-04-21 NOTE — PROGRESS NOTES
"The patient location is: home  The chief complaint leading to consultation is: latching, reducing pumping  Visit type: audiovisual  Total time spent with patient: 78 minutes  Each patient to whom he or she provides medical services by telemedicine is:  (1) informed of the relationship between the physician and patient and the respective role of any other health care provider with respect to management of the patient; and (2) notified that he or she may decline to receive medical services by telemedicine and may withdraw from such care at any time.      Lactation Video Consult      Reason for consultation: assistance with painful latch and decreasing "triple feedings"    START TIME:       Babys : 20  [facility]: [facility] of birth: [facility]    Breastfeeding History at home: Infant was in NICU, born at 33.3 weeks. Pt was pumping while infant in the NICU and continues pumping at least 8x/24 hours. Infant nursing at breast 6-7x/24 hours but this is an increase to the last week when he was nursing only 3-4x/24 hour. Infant receives approximately 12oz EBM via paced bottle feeding each 24 hours, mostly at night. Infant has adequate voids and stools, parents report yesterday he had 10 yellow seedy stools, 1/2 of which were "blow outs", adequate voids, infant spits up after almost every feeding (both breast and bottle). Pt was using nipple shield but has not used one x 7 days regularly; sometimes will use shield "in a pinch or if nothing else is working."    Pumpin-9x/24 hours achieves 20-24oz/24 hours (average 21oz)    Baby's weights:    Birth Weight: 4#  DC weight:  4#6.5oz    Last MD Visit (Monserrat SOLOMON): 3/10, weight 4#10oz    Parents have bathtub scale at home weight on 4/15 was 7#5oz, parents report scale is sensitive to the gram. 2#11oz weight gain in 41 day. Approximately 1.8oz/day    Breastfeeding goals: "Breastfeed at the breast more, stop pumping as much as I am, I don't think I'll do " "this longer than 6 months."    Feeding assessment for today's consult:    Left breast (8 minutes): Cross cradle position, infant "slurps" himself onto breast, pt reports 8/10 pain that resolves <60 seconds with bringing infant closer to body. Infant sucks himself to deep latch, lips flared. Frequent audible swallows. At 6 minutes infant slows and is NNS. LC encouraged breast compression and stimulation which result in 1 more minute of nutritive drinking and then pt unlatches infant. Nipple compressed at tip, pink. Infant's body is relaxed x 5 minutes and then he begins showing cues.     Before next side LC demonstrated asymmetrical latch with plush breast and doll, educated pt to support infant behind shoulders and wait for wide gape before bringing infant to breast.    Right breast (11 minutes)  Using above instruction pt latches infant in cr-cr hold. Wide gape and deep latch achieved, pt reports "that didn't hurt at all and he is much deeper on the breast." Infant actively drinking with audible swallows. Infant releases breast after 11 minutes, mother is burping infant and he has a small amount of emesis. Pt reports "that is the most he has ever spit up." Infant is relaxed and sleeping after feeding.    Mother: Breasts large, round and symmetrical, nipples nick, very pink and skin appears intact.     Baby: Infant appears well groomed and nourished. Has had significant weight gain since hospital discharge.     Pt to attempt to decrease number of pumping sessions. Plan is to breastfeed ad jennifer and every other breastfeeding to pump bilaterally x 20 minutes (at least 4x every 24 hours), pt to pump/hand express to comfort between feedings if needed to manage full breasts. Pt to offer EBM to infant at night as she has been doing but to try to only breastfeed during the day. Pt agreeable and excited about plan.       Recommended Interventions and Plan of Care for Marisela Siddiqui      X Breastfeed baby  on cue until " "content at least 8 or more times in 24hrs. No more than one 5 hour stretch at night.    X Use the asymmetric latch as demonstrated during the consult. Go to www.[employer] or www.[place] [employer].org  "Attaching Your Baby at the Breast" (English)    X Observe for signs of milk transfer to baby:   wide pauses in the sucks   swallows throughout  the feedings   milk on the babys lips when removed from the breast   wet nipple as it comes out of the babys mouth   heavy breasts before a feeding and softer breasts after the feeding    X Try to latch the baby onto the breast until latch occurs or until 10-15 min. elapse.  If unable to latch the baby deeply onto the breasts, supplement the baby and pump the breasts until empty and store the milk for the next feeding.    X Pump after every other feeding    X  Hand express/pump for a few minutes between feedings if breasts feel too full     X  Count and record the number of feedings, urine diapers, and dirty diapers every 24hrs.    X Clean all breastfeeding aids with warm soapy water after each use and sterilize each day.    X Ask baby's MD about oral assessment and  a referral to a Speech Language Pathologist    X Call back if you feel you need more practice with breastfeeding or if you have more questions. Call [phone]    X Virtual visit scheduled 4/30 at 1400 with this LC           "

## 2020-04-23 ENCOUNTER — TELEPHONE (OUTPATIENT)
Dept: OBSTETRICS AND GYNECOLOGY | Facility: CLINIC | Age: 45
End: 2020-04-23

## 2020-04-23 ENCOUNTER — TELEPHONE (OUTPATIENT)
Dept: LACTATION | Facility: CLINIC | Age: 45
End: 2020-04-23

## 2020-04-23 NOTE — TELEPHONE ENCOUNTER
Spoke to [facility] [[email or web address]] and confirmed disability form was received and being sent to processed. Spoke with patient to confirm dates of leave. Forms sent to disability.   18-Jan-2017 20:51

## 2020-04-23 NOTE — TELEPHONE ENCOUNTER
----- Message from Mel Garcia MA sent at 4/22/2020  4:20 PM CDT -----  Contact: Esteban with [employer]       ----- Message -----  From: Kristie Anderson  Sent: 4/22/2020   1:28 PM CDT  To: Kiersten Mccann Staff    Esteban with [employer] is calling in reference to paperwork that was sent over for patient. Esteban can be reached at [phone] claim number [id number]

## 2020-04-30 ENCOUNTER — PATIENT MESSAGE (OUTPATIENT)
Dept: LACTATION | Facility: CLINIC | Age: 45
End: 2020-04-30

## 2020-04-30 ENCOUNTER — CLINICAL SUPPORT (OUTPATIENT)
Dept: LACTATION | Facility: CLINIC | Age: 45
End: 2020-04-30
Payer: COMMERCIAL

## 2020-04-30 NOTE — PROGRESS NOTES
"The patient location is: home  The chief complaint leading to consultation is: discuss milk supply, latching  Visit type: audiovisual  Total time spent with patient: 58 minuates  Each patient to whom he or she provides medical services by telemedicine is:  (1) informed of the relationship between the physician and patient and the respective role of any other health care provider with respect to management of the patient; and (2) notified that he or she may decline to receive medical services by telemedicine and may withdraw from such care at any time.      Lactation Video Consult      Reason for consultation: follow up from visit 20, check in regarding infant transitioning to more breastfeeding and less pumping    START TIME: 1400      Babys : 20  Baby's name: Dwain  Baby's MD:  Monserrat but plans to change to [facility]    [facility] birth: [facility]    Breastfeeding History at home: see previous note for information regarding history delivery history.    Pt reports since last visit infant has been eating frequently approximately 11-13x/24 hours, will nurse on both breasts, fall asleep with body completely relaxed and then wake up 45-90 minutes later. Parents have decreased supplementation with EBM after from 12oz/24 hours to 6-10oz/24 hours and report infant has had significantly less discomfort after feedings, no spit up and only occasional "blow out" diapers. Pt has stopped using the nipple shield on left side and has not used shield on right side in >48 hours.     Infant has been feeding more at the breast, 7-8x/24 hours, 4-5 EBM bottles/24 hours (1-2oz each), changed to Dr Keller's nipple and infant is drinking slower. Adequate voids/stools.      Pumpinx/24 hours, achieves 15oz/24 hours    Baby's weights:    Birth Weight: 4#  DC weight:  4#6.5oz    Last MD Visit: 3/10    *Family has scale at home, last weighed infant  8#7oz (naked)    Feeding assessment for today's consult:    Right " "breast (5 minutes): Football hold, initial latch is painful but this resolves <45 seconds. Pt allowing infant to "slurp" nipple into mouth rather than pulling him towards her. With breast compression swallows are audible. Infant releases breast and nipple round    Left breast (10 minutes) Cr-cr hold, wide gape and deep latch achieved, LC offers encouragement to pull infant toward breast. Infant nurses actively for 10 minutes and then falls asleep and releases breast, nipple round.    For remainder of feeding (43 minutes infant is sleeping, body relaxed)     Mother: Breasts large, round and symmetrical, nipples nick and skin appears intact.     Baby: Infant appears well groomed and nourished.     Pt reports she is continuing to have difficulties with frequent feedings and maintaining other aspects of her life, typically will not eat until 1300 d/t frequent feedings in the morning and d/t COVID-19 has not had assistance at home with cooking/cleaning as anticipated. Plans to return to work (from home) 5/11 and has moderate stress about transition.     Extensive education and support provided on normal infant behavior and feeding patterns especially r/t infant's transition from NICU and fully bottle fed to home and breastfeeding more. Discussed storage capacity of breasts and that infant may need to have shorter, more frequent feedings and that it is reassuring that he is content afterwards. Discussed pt taking care of personal needs (eating, taking shower) between feeding sessions and postponing pumping if needed to do that; educated that the number of times pumping each day is more important than exactly when they occur. Encouraged baby wearing and skin to skin at night. Educated on transition back to work and strategies for success.     Recommended Interventions and Plan of Care for Marisela Siddiqui      X Infant should nurse and/or drink EBM at least 8x/24 hours    X Flange lips when curled under    X Use breast " compression when infant sucking but not drinking    X Observe for signs of milk transfer to baby:   wide pauses in the sucks   swallows throughout  the feedings   milk on the babys lips when removed from the breast   wet nipple as it comes out of the babys mouth   heavy breasts before a feeding and softer breasts after the feeding    X Allow infant to breast feed for 10-15 minutes on each breast, then PUMP for 20 minutes and offer infant EBM via paced bottle feeding    X Supplement the baby with pumped breast milk by paced bottle feeding after nursing, until baby is content; give at least 2oz     X Use Breast Pump 20 minutes after nursing to increase supply; pump at least 5 times every 24 hours    X Clean all breastfeeding aids with warm soapy water after each use and sterilize each day.    X Call back if you feel you need more practice with breastfeeding or if you have more questions. Call [phone]    X Consider doing a weighted feeding at home

## 2020-05-07 ENCOUNTER — PATIENT MESSAGE (OUTPATIENT)
Dept: LACTATION | Facility: CLINIC | Age: 45
End: 2020-05-07

## 2020-05-07 ENCOUNTER — TELEPHONE (OUTPATIENT)
Dept: LACTATION | Facility: CLINIC | Age: 45
End: 2020-05-07

## 2020-05-07 NOTE — TELEPHONE ENCOUNTER
Pt reports continued improvement with breastfeeding and decrease in pain. Returns to work tomorrow, plans to decrease pumping from 5x/24 hours to 4x/24 hours to help with transition. Infant voiding and stooling adequately. Infant has Pediatrician appointment 5/8/20. Pt denies questions/concerns at this time. Support and encouragement provided. Has warmline number and will call with any questions/concerns.

## 2020-05-19 ENCOUNTER — PATIENT MESSAGE (OUTPATIENT)
Dept: OBSTETRICS AND GYNECOLOGY | Facility: CLINIC | Age: 45
End: 2020-05-19

## 2020-05-19 ENCOUNTER — TELEPHONE (OUTPATIENT)
Dept: OBSTETRICS AND GYNECOLOGY | Facility: HOSPITAL | Age: 45
End: 2020-05-19

## 2020-05-19 DIAGNOSIS — N61.0 MASTITIS: Primary | ICD-10-CM

## 2020-05-19 RX ORDER — DICLOXACILLIN SODIUM 250 MG/1
250 CAPSULE ORAL 4 TIMES DAILY
Qty: 40 CAPSULE | Refills: 0 | Status: SHIPPED | OUTPATIENT
Start: 2020-05-19 | End: 2020-05-29

## 2020-05-19 NOTE — TELEPHONE ENCOUNTER
Attempted to call patient about symptoms that she's having with left breast.  No answer.  Will send message.  Will also send Rx for dicloxicillin

## 2020-05-19 NOTE — TELEPHONE ENCOUNTER
Patient called to report that her Rx for dicloxacillin was not at her pharmacy. Patient went to pharmacy at 1720. Checked chart and Rx went through at 1734. Patient will try again at pharmacy and call back if Rx not there.     Coco Tian M.D.   OB/GYN  PGY-1

## 2020-05-25 PROBLEM — O14.93 PRE-ECLAMPSIA IN THIRD TRIMESTER: Status: RESOLVED | Noted: 2020-02-10 | Resolved: 2020-05-25

## 2020-08-01 NOTE — TELEPHONE ENCOUNTER
----- Message from Magdalena Dinh sent at 12/17/2019  9:04 AM CST -----  Contact: RINKU DIAL [[id number]]  Name of Who is Calling: RINKU DIAL [[id number]]      What is the request in detail:   Patient called requesting a call prior to her appointment on today Tuesday 12/017/2019.  Please give a call back at your earliest convenience and further advise.       THANKS!      Reply by [facility]: no    Call Back:   RINKU DIAL / ph# [phone]                                      
Spoke to patient and informed her to stop by the lab first to have glucose test started and to check in for her appointment with Dr. Burch.  
Patient/Caregiver provided printed discharge information.

## 2020-09-18 ENCOUNTER — OFFICE VISIT (OUTPATIENT)
Dept: DERMATOLOGY | Facility: CLINIC | Age: 45
End: 2020-09-18
Payer: COMMERCIAL

## 2020-09-18 DIAGNOSIS — L85.8 KP (KERATOSIS PILARIS): ICD-10-CM

## 2020-09-18 DIAGNOSIS — L21.9 SEBORRHEIC DERMATITIS: Primary | ICD-10-CM

## 2020-09-18 DIAGNOSIS — L30.9 HAND DERMATITIS: ICD-10-CM

## 2020-09-18 DIAGNOSIS — L98.9 DISEASE OF SKIN AND SUBCUTANEOUS TISSUE: ICD-10-CM

## 2020-09-18 PROCEDURE — 99214 PR OFFICE/OUTPT VISIT, EST, LEVL IV, 30-39 MIN: ICD-10-PCS | Mod: S$GLB,,, | Performed by: DERMATOLOGY

## 2020-09-18 PROCEDURE — 99999 PR PBB SHADOW E&M-EST. PATIENT-LVL III: CPT | Mod: PBBFAC,,, | Performed by: DERMATOLOGY

## 2020-09-18 PROCEDURE — 99214 OFFICE O/P EST MOD 30 MIN: CPT | Mod: S$GLB,,, | Performed by: DERMATOLOGY

## 2020-09-18 PROCEDURE — 99999 PR PBB SHADOW E&M-EST. PATIENT-LVL III: ICD-10-PCS | Mod: PBBFAC,,, | Performed by: DERMATOLOGY

## 2020-09-18 RX ORDER — TRIAMCINOLONE ACETONIDE 1 MG/G
CREAM TOPICAL
Qty: 80 G | Refills: 3 | Status: SHIPPED | OUTPATIENT
Start: 2020-09-18

## 2020-09-18 RX ORDER — CYCLOBENZAPRINE HCL 5 MG
TABLET ORAL
COMMUNITY
Start: 2020-09-16

## 2020-09-18 NOTE — PROGRESS NOTES
Subjective:       Patient ID:  Marisela Siddiqui is a 45 y.o. female who presents for   Chief Complaint   Patient presents with    Rash     breast and finger     Pt nursing 7 month old boy    Patient with new complaint of lesion(s)  Location: rash on fingers  Duration: 6 months  Symptoms: started as tiny bumps that oozed and became very itchy  Relieving factors/Previous treatments: HC 2.5% cream - helped    C/o rash between breasts intermittent x years. Worse in past 4 months (worse with heat) tx aveeno eczema repair cream    C/o skin tag left axilla x 7 months        Review of Systems   Skin: Positive for itching and rash.        No h/o AD   Hematologic/Lymphatic: Does not bruise/bleed easily.   Allergic/Immunologic: Negative for environmental allergies.        Objective:    Physical Exam   Constitutional: She appears well-developed and well-nourished. No distress.   Neurological: She is alert and oriented to person, place, and time. She is not disoriented.   Psychiatric: She has a normal mood and affect.   Skin:   Areas Examined (abnormalities noted in diagram):   Head / Face Inspection Performed  Chest / Axilla Inspection Performed  Nails and Digits Inspection Performed                       Diagram Legend     Erythematous scaling macule/papule c/w actinic keratosis       Vascular papule c/w angioma      Pigmented verrucoid papule/plaque c/w seborrheic keratosis      Yellow umbilicated papule c/w sebaceous hyperplasia      Irregularly shaped tan macule c/w lentigo     1-2 mm smooth white papules consistent with Milia      Movable subcutaneous cyst with punctum c/w epidermal inclusion cyst      Subcutaneous movable cyst c/w pilar cyst      Firm pink to brown papule c/w dermatofibroma      Pedunculated fleshy papule(s) c/w skin tag(s)      Evenly pigmented macule c/w junctional nevus     Mildly variegated pigmented, slightly irregular-bordered macule c/w mildly atypical nevus      Flesh colored to evenly  pigmented papule c/w intradermal nevus       Pink pearly papule/plaque c/w basal cell carcinoma      Erythematous hyperkeratotic cursted plaque c/w SCC      Surgical scar with no sign of skin cancer recurrence      Open and closed comedones      Inflammatory papules and pustules      Verrucoid papule consistent consistent with wart     Erythematous eczematous patches and plaques     Dystrophic onycholytic nail with subungual debris c/w onychomycosis     Umbilicated papule    Erythematous-base heme-crusted tan verrucoid plaque consistent with inflamed seborrheic keratosis     Erythematous Silvery Scaling Plaque c/w Psoriasis     See annotation      Assessment / Plan:        Seborrheic dermatitis  Restart hc 2.5% cream bid    Disease of skin and subcutaneous tissue - ? boris's  -     triamcinolone acetonide 0.1% (KENALOG) 0.1 % cream; AAA chest/finger bid prn flare  Dispense: 80 g; Refill: 3      Hand dermatitis  -     triamcinolone acetonide 0.1% (KENALOG) 0.1 % cream; AAA chest/finger bid prn flare  Dispense: 80 g; Refill: 3  Recommend Elta MD So Silky Hand CREME or O'Guillermo's working hands q hand washing and q hour while awake.    Recommend neutrogena norweigan formula hand cream under white cotton gloves qhs.          KP (keratosis pilaris)  Recommend using a mild, moisturizing soap as Keratosis pilaris is exacerbated by dry skin.    Other OTC options:   Bare 40 - amazon $18   Gold bond rough and bumpy skin cream   Елена In Shower Wash Off Lotion   Glytone wash and Cerave SA cream  Glytone KP kit           Follow up if symptoms worsen or fail to improve.

## 2020-09-18 NOTE — PATIENT INSTRUCTIONS
Recommend Elta MD So Silky Hand CREME or O[employer]ill[employer]'s working hands q hand washing and q hour while awake.    Recommend neutrogena norweigan formula hand cream under white cotton gloves qhs.      XEROSIS (DRY SKIN)        1. Definition    Xerosis is the term for dry skin.  We all have a natural oil coating over our skin produced by the skin oil glands.  If this oil is removed, the skin becomes dry which can lead to cracking, which can lead to inflammation.  Xerosis is usually a long-term problem that recurs often, especially in the winter.    2. Cause     Long hot baths or showers can remove our natural oil and lead to xerosis.  One should never take more than one bath or shower a day and for no longer than ten minutes.   Use of harsh soaps such as Zest, Dial, and Ivory can worsen and cause xerosis.   Cold winter weather worsens xerosis because the amount of moisture contained in cold air is much less than the amount of moisture in warm air.    3. Treatment     Treatment is intended to restore the natural oil to your skin.  Keep the skin lubricated.     Do not take more than one bath or shower a day.  Use lukewarm water, not hot.  Hot water dries out the skin.     Use a gentle moisturizing soap such as Cetaphil soap, Oil of Olay, Dove, Basis, Ivory moisture care, Restoraderm cleanser.     When toweling dry, dont rub.  Blot the skin so there is still some water left on the skin.  You should apply a moisturizing cream to all of the skin such as Cerave cream, Cetaphil cream, Restoraderm or Eucerin Original Formula cream.   Alpha hydroxyacid lotions, i.e., AmLactin, also work very well for preventing dry skin, but may burn when used on inflamed or reddened skin.     If you like to swim during the winter months, you should not use soap when getting out of the pool.  When you have finished swimming, rinse off the chlorine with cool to warm water.  If this will be the only shower of the day, then you may use Cetaphil  or another mild soap to cleanse your skin.  After the shower, apply a moisturizing cream to all of the skin as above.        [address] [place] [address]/ [phone] FAX/ [email or web address]

## 2021-02-01 PROBLEM — Z3A.32 32 WEEKS GESTATION OF PREGNANCY: Status: RESOLVED | Noted: 2020-02-20 | Resolved: 2021-02-01

## 2021-04-12 ENCOUNTER — PATIENT MESSAGE (OUTPATIENT)
Dept: OBSTETRICS AND GYNECOLOGY | Facility: CLINIC | Age: 46
End: 2021-04-12

## 2021-04-12 ENCOUNTER — PATIENT MESSAGE (OUTPATIENT)
Dept: DERMATOLOGY | Facility: CLINIC | Age: 46
End: 2021-04-12

## 2021-04-28 ENCOUNTER — PATIENT MESSAGE (OUTPATIENT)
Dept: OBSTETRICS AND GYNECOLOGY | Facility: CLINIC | Age: 46
End: 2021-04-28

## 2021-04-28 ENCOUNTER — PATIENT MESSAGE (OUTPATIENT)
Dept: DERMATOLOGY | Facility: CLINIC | Age: 46
End: 2021-04-28

## 2021-04-28 ENCOUNTER — PATIENT MESSAGE (OUTPATIENT)
Dept: RESEARCH | Facility: HOSPITAL | Age: 46
End: 2021-04-28

## 2021-05-03 ENCOUNTER — OFFICE VISIT (OUTPATIENT)
Dept: OBSTETRICS AND GYNECOLOGY | Facility: CLINIC | Age: 46
End: 2021-05-03
Payer: COMMERCIAL

## 2021-05-03 VITALS
HEIGHT: 63 IN | SYSTOLIC BLOOD PRESSURE: 124 MMHG | WEIGHT: 168.19 LBS | BODY MASS INDEX: 29.8 KG/M2 | DIASTOLIC BLOOD PRESSURE: 72 MMHG

## 2021-05-03 DIAGNOSIS — Z01.419 ENCOUNTER FOR WELL WOMAN EXAM WITH ROUTINE GYNECOLOGICAL EXAM: Primary | ICD-10-CM

## 2021-05-03 PROCEDURE — 99396 PR PREVENTIVE VISIT,EST,40-64: ICD-10-PCS | Mod: S$GLB,,, | Performed by: OBSTETRICS & GYNECOLOGY

## 2021-05-03 PROCEDURE — 87624 HPV HI-RISK TYP POOLED RSLT: CPT | Performed by: OBSTETRICS & GYNECOLOGY

## 2021-05-03 PROCEDURE — 99396 PREV VISIT EST AGE 40-64: CPT | Mod: S$GLB,,, | Performed by: OBSTETRICS & GYNECOLOGY

## 2021-05-03 PROCEDURE — 88175 CYTOPATH C/V AUTO FLUID REDO: CPT | Performed by: OBSTETRICS & GYNECOLOGY

## 2021-05-03 PROCEDURE — 99999 PR PBB SHADOW E&M-EST. PATIENT-LVL III: CPT | Mod: PBBFAC,,, | Performed by: OBSTETRICS & GYNECOLOGY

## 2021-05-03 PROCEDURE — 99999 PR PBB SHADOW E&M-EST. PATIENT-LVL III: ICD-10-PCS | Mod: PBBFAC,,, | Performed by: OBSTETRICS & GYNECOLOGY

## 2021-05-03 RX ORDER — BUPROPION HYDROCHLORIDE 100 MG/1
100 TABLET, EXTENDED RELEASE ORAL EVERY MORNING
COMMUNITY
Start: 2021-04-26

## 2021-05-06 LAB
HPV HR 12 DNA SPEC QL NAA+PROBE: NEGATIVE
HPV16 AG SPEC QL: NEGATIVE
HPV18 DNA SPEC QL NAA+PROBE: NEGATIVE

## 2021-05-10 LAB
FINAL PATHOLOGIC DIAGNOSIS: NORMAL
Lab: NORMAL

## 2021-07-15 ENCOUNTER — PATIENT MESSAGE (OUTPATIENT)
Dept: INTERNAL MEDICINE | Facility: CLINIC | Age: 46
End: 2021-07-15

## 2021-07-16 ENCOUNTER — PATIENT MESSAGE (OUTPATIENT)
Dept: OBSTETRICS AND GYNECOLOGY | Facility: CLINIC | Age: 46
End: 2021-07-16

## 2021-07-16 ENCOUNTER — PATIENT MESSAGE (OUTPATIENT)
Dept: DERMATOLOGY | Facility: CLINIC | Age: 46
End: 2021-07-16

## 2021-07-19 ENCOUNTER — OFFICE VISIT (OUTPATIENT)
Dept: OBSTETRICS AND GYNECOLOGY | Facility: CLINIC | Age: 46
End: 2021-07-19
Payer: COMMERCIAL

## 2021-07-19 DIAGNOSIS — N94.6 DYSMENORRHEA: Primary | ICD-10-CM

## 2021-07-19 PROCEDURE — 99213 OFFICE O/P EST LOW 20 MIN: CPT | Mod: 95,,, | Performed by: OBSTETRICS & GYNECOLOGY

## 2021-07-19 PROCEDURE — 99213 PR OFFICE/OUTPT VISIT, EST, LEVL III, 20-29 MIN: ICD-10-PCS | Mod: 95,,, | Performed by: OBSTETRICS & GYNECOLOGY

## 2021-07-19 RX ORDER — TRAMADOL HYDROCHLORIDE 50 MG/1
50 TABLET ORAL EVERY 6 HOURS PRN
Qty: 30 TABLET | Refills: 1 | Status: SHIPPED | OUTPATIENT
Start: 2021-07-19 | End: 2022-01-03 | Stop reason: SDUPTHER

## 2021-09-10 ENCOUNTER — PATIENT MESSAGE (OUTPATIENT)
Dept: DERMATOLOGY | Facility: CLINIC | Age: 46
End: 2021-09-10

## 2021-09-14 RX ORDER — FLUOCINOLONE ACETONIDE 0.11 MG/ML
OIL TOPICAL 3 TIMES DAILY
Qty: 118.28 ML | Refills: 0 | Status: SHIPPED | OUTPATIENT
Start: 2021-09-14

## 2021-09-14 RX ORDER — CICLOPIROX 1 G/100ML
SHAMPOO TOPICAL
Qty: 120 ML | Refills: 3 | Status: SHIPPED | OUTPATIENT
Start: 2021-09-14

## 2021-09-17 ENCOUNTER — OFFICE VISIT (OUTPATIENT)
Dept: DERMATOLOGY | Facility: CLINIC | Age: 46
End: 2021-09-17
Payer: COMMERCIAL

## 2021-09-17 DIAGNOSIS — L21.9 SEBORRHEIC DERMATITIS: Primary | ICD-10-CM

## 2021-09-17 PROCEDURE — 99213 OFFICE O/P EST LOW 20 MIN: CPT | Mod: S$GLB,,, | Performed by: DERMATOLOGY

## 2021-09-17 PROCEDURE — 99213 PR OFFICE/OUTPT VISIT, EST, LEVL III, 20-29 MIN: ICD-10-PCS | Mod: S$GLB,,, | Performed by: DERMATOLOGY

## 2021-09-17 PROCEDURE — 99999 PR PBB SHADOW E&M-EST. PATIENT-LVL III: CPT | Mod: PBBFAC,,,

## 2021-09-17 PROCEDURE — 99999 PR PBB SHADOW E&M-EST. PATIENT-LVL III: ICD-10-PCS | Mod: PBBFAC,,,

## 2021-09-17 RX ORDER — HYDROCORTISONE 25 MG/G
CREAM TOPICAL 2 TIMES DAILY
Qty: 28 G | Refills: 2 | Status: SHIPPED | OUTPATIENT
Start: 2021-09-17

## 2022-01-02 ENCOUNTER — PATIENT MESSAGE (OUTPATIENT)
Dept: OBSTETRICS AND GYNECOLOGY | Facility: CLINIC | Age: 47
End: 2022-01-02
Payer: COMMERCIAL

## 2022-01-03 DIAGNOSIS — N94.6 DYSMENORRHEA: ICD-10-CM

## 2022-01-03 RX ORDER — TRAMADOL HYDROCHLORIDE 50 MG/1
50 TABLET ORAL EVERY 6 HOURS PRN
Qty: 30 TABLET | Refills: 0 | Status: SHIPPED | OUTPATIENT
Start: 2022-01-03 | End: 2023-01-03

## 2022-01-03 NOTE — TELEPHONE ENCOUNTER
----- Message from Evin Frost sent at 1/3/2022  9:32 AM CST -----  PLEASE CALL PT SHE HAS A QUESTION [phone]

## 2022-01-03 NOTE — TELEPHONE ENCOUNTER
Spoke to patient. She states she was saturating 3-4 pads/tampons in a 3 hour time period. States she is no longer bleeding this heavy and it has decreased in flow. Denies severe pelvic pain/cramping. Denies breastfeeding and not on any birth control. Advised to monitor cycle since it is the first time this has occurred. Also requested refill on tramadol for cramps on cycle. Rx request sent.

## 2022-01-26 ENCOUNTER — TELEPHONE (OUTPATIENT)
Dept: OBSTETRICS AND GYNECOLOGY | Facility: CLINIC | Age: 47
End: 2022-01-26
Payer: COMMERCIAL

## 2022-01-26 ENCOUNTER — PATIENT MESSAGE (OUTPATIENT)
Dept: DERMATOLOGY | Facility: CLINIC | Age: 47
End: 2022-01-26
Payer: COMMERCIAL

## 2022-01-26 ENCOUNTER — PATIENT MESSAGE (OUTPATIENT)
Dept: OBSTETRICS AND GYNECOLOGY | Facility: CLINIC | Age: 47
End: 2022-01-26
Payer: COMMERCIAL

## 2022-01-26 DIAGNOSIS — Z12.31 SCREENING MAMMOGRAM FOR BREAST CANCER: Primary | ICD-10-CM

## 2022-01-26 NOTE — TELEPHONE ENCOUNTER
Patient states she saturates tampons 2-3 house using a pad as extra protection, but only filling the tampon. Denies fatigue and weakness at this time.

## 2022-02-15 ENCOUNTER — HOSPITAL ENCOUNTER (OUTPATIENT)
Dept: RADIOLOGY | Facility: HOSPITAL | Age: 47
Discharge: HOME | End: 2022-02-15
Attending: OBSTETRICS & GYNECOLOGY
Payer: COMMERCIAL

## 2022-02-15 VITALS — BODY MASS INDEX: 27.46 KG/M2 | WEIGHT: 155 LBS

## 2022-02-15 DIAGNOSIS — Z12.31 SCREENING MAMMOGRAM FOR BREAST CANCER: ICD-10-CM

## 2022-02-15 PROCEDURE — 77067 SCR MAMMO BI INCL CAD: CPT | Mod: 26,,, | Performed by: RADIOLOGY

## 2022-02-15 PROCEDURE — 77063 MAMMO DIGITAL SCREENING BILAT WITH TOMO: ICD-10-PCS | Mod: 26,,, | Performed by: RADIOLOGY

## 2022-02-15 PROCEDURE — 77067 SCR MAMMO BI INCL CAD: CPT | Mod: TC,PN

## 2022-02-15 PROCEDURE — 77063 BREAST TOMOSYNTHESIS BI: CPT | Mod: 26,,, | Performed by: RADIOLOGY

## 2022-02-15 PROCEDURE — 77067 MAMMO DIGITAL SCREENING BILAT WITH TOMO: ICD-10-PCS | Mod: 26,,, | Performed by: RADIOLOGY

## 2022-02-15 PROCEDURE — 77063 BREAST TOMOSYNTHESIS BI: CPT | Mod: TC,PN

## 2022-05-03 ENCOUNTER — OFFICE VISIT (OUTPATIENT)
Dept: DERMATOLOGY | Facility: CLINIC | Age: 47
End: 2022-05-03
Payer: COMMERCIAL

## 2022-05-03 DIAGNOSIS — D48.5 NEOPLASM OF UNCERTAIN BEHAVIOR OF SKIN: Primary | ICD-10-CM

## 2022-05-03 DIAGNOSIS — L21.9 SEBORRHEIC DERMATITIS: ICD-10-CM

## 2022-05-03 DIAGNOSIS — L71.9 ROSACEA: ICD-10-CM

## 2022-05-03 PROCEDURE — 88305 TISSUE EXAM BY PATHOLOGIST: CPT | Performed by: PATHOLOGY

## 2022-05-03 PROCEDURE — 1159F PR MEDICATION LIST DOCUMENTED IN MEDICAL RECORD: ICD-10-PCS | Mod: CPTII,S$GLB,, | Performed by: DERMATOLOGY

## 2022-05-03 PROCEDURE — 88305 TISSUE EXAM BY PATHOLOGIST: ICD-10-PCS | Mod: 26,,, | Performed by: PATHOLOGY

## 2022-05-03 PROCEDURE — 1160F RVW MEDS BY RX/DR IN RCRD: CPT | Mod: CPTII,S$GLB,, | Performed by: DERMATOLOGY

## 2022-05-03 PROCEDURE — 11102 PR TANGENTIAL BIOPSY, SKIN, SINGLE LESION: ICD-10-PCS | Mod: S$GLB,,, | Performed by: DERMATOLOGY

## 2022-05-03 PROCEDURE — 11102 TANGNTL BX SKIN SINGLE LES: CPT | Mod: S$GLB,,, | Performed by: DERMATOLOGY

## 2022-05-03 PROCEDURE — 88305 TISSUE EXAM BY PATHOLOGIST: CPT | Mod: 26,,, | Performed by: PATHOLOGY

## 2022-05-03 PROCEDURE — 1160F PR REVIEW ALL MEDS BY PRESCRIBER/CLIN PHARMACIST DOCUMENTED: ICD-10-PCS | Mod: CPTII,S$GLB,, | Performed by: DERMATOLOGY

## 2022-05-03 PROCEDURE — 99999 PR PBB SHADOW E&M-EST. PATIENT-LVL III: CPT | Mod: PBBFAC,,, | Performed by: DERMATOLOGY

## 2022-05-03 PROCEDURE — 99214 PR OFFICE/OUTPT VISIT, EST, LEVL IV, 30-39 MIN: ICD-10-PCS | Mod: 25,S$GLB,, | Performed by: DERMATOLOGY

## 2022-05-03 PROCEDURE — 99999 PR PBB SHADOW E&M-EST. PATIENT-LVL III: ICD-10-PCS | Mod: PBBFAC,,, | Performed by: DERMATOLOGY

## 2022-05-03 PROCEDURE — 1159F MED LIST DOCD IN RCRD: CPT | Mod: CPTII,S$GLB,, | Performed by: DERMATOLOGY

## 2022-05-03 PROCEDURE — 99214 OFFICE O/P EST MOD 30 MIN: CPT | Mod: 25,S$GLB,, | Performed by: DERMATOLOGY

## 2022-05-03 RX ORDER — KETOCONAZOLE 20 MG/ML
SHAMPOO, SUSPENSION TOPICAL
Qty: 120 ML | Refills: 5 | Status: SHIPPED | OUTPATIENT
Start: 2022-05-05

## 2022-05-03 RX ORDER — CLOBETASOL PROPIONATE 0.05 G/100ML
SHAMPOO TOPICAL
Qty: 118 ML | Refills: 3 | Status: SHIPPED | OUTPATIENT
Start: 2022-05-03

## 2022-05-03 RX ORDER — AZELAIC ACID 0.15 G/G
GEL TOPICAL
Qty: 50 G | Refills: 2 | Status: SHIPPED | OUTPATIENT
Start: 2022-05-03 | End: 2023-05-03

## 2022-05-03 RX ORDER — CLOBETASOL PROPIONATE 0.05 G/ML
SPRAY TOPICAL
Qty: 59 ML | Refills: 3 | Status: SHIPPED | OUTPATIENT
Start: 2022-05-03

## 2022-05-03 NOTE — PROGRESS NOTES
Subjective:       Patient ID:  Marisela Siddiqui is a 46 y.o. female who presents for   Chief Complaint   Patient presents with    Follow-up     Seb derm    Lesion     chest     Pt presents for follow up of seb derm.  Pt last seen 09/17/2021 for seb derm . Has used several RX meds for this in past. Right now using ciclopirox shampoo. Would like to retry Clobex shampoo and spray if covered.  Also c/o new growth on chest. The patient denies any moles or growths of the skin that are rapidly growing, hurting, itching, bleeding, or changing colors.      Patient with new complaint of lesion(s)  Location: chest  Duration: few weeks  Symptoms: raised  Relieving factors/Previous treatments: none    Has facial redness and bumps, made worse w alcohol and sun.  Was at [facility] and this may have triggered.      Review of Systems   Skin: Positive for itching, daily sunscreen use and activity-related sunscreen use.        Objective:    Physical Exam   Constitutional: She appears well-developed and well-nourished. No distress.   Neurological: She is alert and oriented to person, place, and time. She is not disoriented.   Psychiatric: She has a normal mood and affect.   Skin:   Areas Examined (abnormalities noted in diagram):   Scalp / Hair Palpated and Inspected  Head / Face Inspection Performed  Neck Inspection Performed  Chest / Axilla Inspection Performed                   Diagram Legend     Erythematous scaling macule/papule c/w actinic keratosis       Vascular papule c/w angioma      Pigmented verrucoid papule/plaque c/w seborrheic keratosis      Yellow umbilicated papule c/w sebaceous hyperplasia      Irregularly shaped tan macule c/w lentigo     1-2 mm smooth white papules consistent with Milia      Movable subcutaneous cyst with punctum c/w epidermal inclusion cyst      Subcutaneous movable cyst c/w pilar cyst      Firm pink to brown papule c/w dermatofibroma      Pedunculated fleshy papule(s) c/w skin tag(s)      Evenly  pigmented macule c/w junctional nevus     Mildly variegated pigmented, slightly irregular-bordered macule c/w mildly atypical nevus      Flesh colored to evenly pigmented papule c/w intradermal nevus       Pink pearly papule/plaque c/w basal cell carcinoma      Erythematous hyperkeratotic cursted plaque c/w SCC      Surgical scar with no sign of skin cancer recurrence      Open and closed comedones      Inflammatory papules and pustules      Verrucoid papule consistent consistent with wart     Erythematous eczematous patches and plaques     Dystrophic onycholytic nail with subungual debris c/w onychomycosis     Umbilicated papule    Erythematous-base heme-crusted tan verrucoid plaque consistent with inflamed seborrheic keratosis     Erythematous Silvery Scaling Plaque c/w Psoriasis     See annotation          Assessment / Plan:      Pathology Orders:     Normal Orders This Visit    Specimen to Pathology, Dermatology     Questions:    Procedure Type: Dermatology and skin neoplasms    Number of Specimens: 1    ------------------------: -------------------------    Spec 1 Procedure: Biopsy    Spec 1 Clinical Impression: r/o SK vs BCC    Spec 1 Source: central chest    Clinical Information: fleshy firm new pink papule    Release to patient: Immediate        Neoplasm of uncertain behavior of skin  -     Specimen to Pathology, Dermatology  Central chest likely SK r/o BCC  Shave biopsy procedure note:    Shave biopsy performed after verbal consent including risk of infection, scar, recurrence, need for additional treatment of site. Area prepped with alcohol, anesthetized with approximately 1.0cc of 1% lidocaine with epinephrine. Lesional tissue shaved with razor blade. Hemostasis achieved with application of aluminum chloride. No complications. Dressing applied. Wound care explained.      Seborrheic dermatitis  -     ketoconazole (NIZORAL) 2 % shampoo; Apply topically twice a week.  Dispense: 120 mL; Refill: 5  -      clobetasoL (CLOBEX) 0.05 % shampoo; To scalp PRN severe itching  Dispense: 118 mL; Refill: 3  -     clobetasoL (CLOBEX) 0.05 % topical spray; BID to itchy areas on scalp PRN  Dispense: 59 mL; Refill: 3      Rosacea  -     azelaic acid (AZELEX) 15 % gel; Daily to face for rosacea  Dispense: 50 g; Refill: 2      Skin tag - discussed cosmetic removal           Follow up for for TBSE.

## 2022-05-10 LAB
FINAL PATHOLOGIC DIAGNOSIS: NORMAL
GROSS: NORMAL
Lab: NORMAL
MICROSCOPIC EXAM: NORMAL

## 2022-05-10 NOTE — PROGRESS NOTES
Skin, central chest, shave biopsy:   -LICHENOID KERATOSIS, see comment   COMMENT: The finding of a lichenoid dermatitis pattern in a solitary lesion   is characteristic of a lichenoid keratosis.  Some of these lesions are   secondary changes in seborrheic keratoses, solar lentigos, verrucae, and   other conditions.

## 2022-06-21 ENCOUNTER — OFFICE VISIT (OUTPATIENT)
Dept: DERMATOLOGY | Facility: CLINIC | Age: 47
End: 2022-06-21
Payer: COMMERCIAL

## 2022-06-21 DIAGNOSIS — L71.9 ROSACEA: ICD-10-CM

## 2022-06-21 DIAGNOSIS — L85.8 KP (KERATOSIS PILARIS): ICD-10-CM

## 2022-06-21 DIAGNOSIS — Z12.83 SCREENING EXAM FOR SKIN CANCER: ICD-10-CM

## 2022-06-21 DIAGNOSIS — L21.9 SEBORRHEIC DERMATITIS: Primary | ICD-10-CM

## 2022-06-21 PROCEDURE — 99999 PR PBB SHADOW E&M-EST. PATIENT-LVL III: CPT | Mod: PBBFAC,,, | Performed by: DERMATOLOGY

## 2022-06-21 PROCEDURE — 99999 PR PBB SHADOW E&M-EST. PATIENT-LVL III: ICD-10-PCS | Mod: PBBFAC,,, | Performed by: DERMATOLOGY

## 2022-06-21 PROCEDURE — 99214 PR OFFICE/OUTPT VISIT, EST, LEVL IV, 30-39 MIN: ICD-10-PCS | Mod: S$GLB,,, | Performed by: DERMATOLOGY

## 2022-06-21 PROCEDURE — 1159F MED LIST DOCD IN RCRD: CPT | Mod: CPTII,S$GLB,, | Performed by: DERMATOLOGY

## 2022-06-21 PROCEDURE — 99214 OFFICE O/P EST MOD 30 MIN: CPT | Mod: S$GLB,,, | Performed by: DERMATOLOGY

## 2022-06-21 PROCEDURE — 1159F PR MEDICATION LIST DOCUMENTED IN MEDICAL RECORD: ICD-10-PCS | Mod: CPTII,S$GLB,, | Performed by: DERMATOLOGY

## 2022-06-21 RX ORDER — CLOBETASOL PROPIONATE 0.46 MG/ML
SOLUTION TOPICAL 2 TIMES DAILY
Qty: 50 ML | Refills: 3 | Status: SHIPPED | OUTPATIENT
Start: 2022-06-21

## 2022-06-21 NOTE — PROGRESS NOTES
Subjective:       Patient ID:  Marisela Siddiqui is a 46 y.o. female who presents for   Chief Complaint   Patient presents with    Skin Check     tbse     History of Present Illness: The patient presents for follow up of skin check.    The patient was last seen on: 5/3/22 for bx of chest which have resolved.     Other skin complaints: no complaints       Final Pathologic Diagnosis   Date Value Ref Range Status   05/03/2022   Final    Skin, central chest, shave biopsy:  -LICHENOID KERATOSIS, see comment  COMMENT: The finding of a lichenoid dermatitis pattern in a solitary lesion  is characteristic of a lichenoid keratosis.  Some of these lesions are  secondary changes in seborrheic keratoses, solar lentigos, verrucae, and  other conditions.       Comment:     Interp By Serg Madrigal M.D., Signed on 05/10/2022 at 11:18           Today here for TBSE and refills of scalp seb derm medications. [place]    The patient denies any moles or growths of the skin that are rapidly growing, hurting, itching, bleeding, or changing colors.    Review of Systems   Skin: Positive for itching, daily sunscreen use, activity-related sunscreen use and wears hat.        Objective:    Physical Exam   Constitutional: She appears well-developed and well-nourished. No distress.   Neurological: She is alert and oriented to person, place, and time. She is not disoriented.   Psychiatric: She has a normal mood and affect.   Skin:   Areas Examined (abnormalities noted in diagram):   Scalp / Hair Palpated and Inspected  Head / Face Inspection Performed  Neck Inspection Performed  Chest / Axilla Inspection Performed  Abdomen Inspection Performed  Genitals / Buttocks / Groin Inspection Performed  Back Inspection Performed  RUE Inspected  LUE Inspection Performed  RLE Inspected  LLE Inspection Performed  Nails and Digits Inspection Performed                   Diagram Legend     Erythematous scaling macule/papule c/w actinic  keratosis       Vascular papule c/w angioma      Pigmented verrucoid papule/plaque c/w seborrheic keratosis      Yellow umbilicated papule c/w sebaceous hyperplasia      Irregularly shaped tan macule c/w lentigo     1-2 mm smooth white papules consistent with Milia      Movable subcutaneous cyst with punctum c/w epidermal inclusion cyst      Subcutaneous movable cyst c/w pilar cyst      Firm pink to brown papule c/w dermatofibroma      Pedunculated fleshy papule(s) c/w skin tag(s)      Evenly pigmented macule c/w junctional nevus     Mildly variegated pigmented, slightly irregular-bordered macule c/w mildly atypical nevus      Flesh colored to evenly pigmented papule c/w intradermal nevus       Pink pearly papule/plaque c/w basal cell carcinoma      Erythematous hyperkeratotic cursted plaque c/w SCC      Surgical scar with no sign of skin cancer recurrence      Open and closed comedones      Inflammatory papules and pustules      Verrucoid papule consistent consistent with wart     Erythematous eczematous patches and plaques     Dystrophic onycholytic nail with subungual debris c/w onychomycosis     Umbilicated papule    Erythematous-base heme-crusted tan verrucoid plaque consistent with inflamed seborrheic keratosis     Erythematous Silvery Scaling Plaque c/w Psoriasis     See annotation      Assessment / Plan:        Seborrheic dermatitis  -     clobetasoL (TEMOVATE) 0.05 % external solution; Apply topically 2 (two) times daily. To active rash in scalp  Dispense: 50 mL; Refill: 3  Continue ketoconazole shampoo, ciclopirox shampoo alternating    Rosacea  Continue azelaic acid gel daily   Sun protection daily  Trigger avoidance    KP (keratosis pilaris)  Emollients    Screening exam for skin cancer  Total body skin examination performed today including at least 12 points as noted in physical examination. No lesions suspicious for malignancy noted.    Recommend daily sun protection/avoidance, use of at least SPF 30,  broad spectrum sunscreen (OTC drug), skin self examinations, and routine physician surveillance to optimize early detection             Follow up if symptoms worsen or fail to improve.

## 2023-05-16 NOTE — PROGRESS NOTES
See viewpoint US report     Secondary Intention Text (Leave Blank If You Do Not Want): The defect will heal with secondary intention.

## 2023-12-18 NOTE — PROGRESS NOTES
Pt seen today by [facility] for growth US.  Pt with complaints of LOF for the past few weeks.  Notes that she has had cloudy, thin discharge intermittently for the past couple weeks.  Has to wear a panty liner as it is soaking her underwear.  On speculum exam, thick white discharge and thin, cloudy discharge noted.  Nitrizine negative.  Valsalva negative.  Negative ferning.  Affirm done.  Appears clinically to look like yeast.  Will send Rx for diflucan.  Pt also having left leg swelling.  Started Friday, prior to her plane flight.  However, pt did travel over the weekend. Will get doppler of LLE.  No calf tenderness.  No SOB.  Pt with normal BP here in clinic. Denies regular CTX, VB, VD, LOF.  + FM.  Continue PNV.  Labor precautions reviewed.  Pt to RTC in 2 weeks.  Will start weekly PNT at 32 weeks.     Pt rec'd sitting on bedside commode in NAD, VSS, IVL, +tele monitoring, family at b/s, agreeable to PT